# Patient Record
Sex: FEMALE | Race: BLACK OR AFRICAN AMERICAN | NOT HISPANIC OR LATINO | Employment: OTHER | URBAN - METROPOLITAN AREA
[De-identification: names, ages, dates, MRNs, and addresses within clinical notes are randomized per-mention and may not be internally consistent; named-entity substitution may affect disease eponyms.]

---

## 2023-10-29 ENCOUNTER — HOSPITAL ENCOUNTER (EMERGENCY)
Facility: HOSPITAL | Age: 68
Discharge: HOME | End: 2023-10-29
Payer: COMMERCIAL

## 2023-10-29 VITALS
HEART RATE: 93 BPM | SYSTOLIC BLOOD PRESSURE: 116 MMHG | RESPIRATION RATE: 16 BRPM | BODY MASS INDEX: 24.33 KG/M2 | WEIGHT: 155 LBS | TEMPERATURE: 97.5 F | HEIGHT: 67 IN | DIASTOLIC BLOOD PRESSURE: 67 MMHG | OXYGEN SATURATION: 98 %

## 2023-10-29 DIAGNOSIS — R30.0 DYSURIA: Primary | ICD-10-CM

## 2023-10-29 LAB
APPEARANCE UR: ABNORMAL
BILIRUB UR STRIP.AUTO-MCNC: NEGATIVE MG/DL
CAOX CRY #/AREA UR COMP ASSIST: ABNORMAL /HPF
COLOR UR: YELLOW
GLUCOSE UR STRIP.AUTO-MCNC: NEGATIVE MG/DL
HOLD SPECIMEN: NORMAL
KETONES UR STRIP.AUTO-MCNC: NEGATIVE MG/DL
LEUKOCYTE ESTERASE UR QL STRIP.AUTO: NEGATIVE
MUCOUS THREADS #/AREA URNS AUTO: ABNORMAL /LPF
NITRITE UR QL STRIP.AUTO: NEGATIVE
PH UR STRIP.AUTO: 5 [PH]
PROT UR STRIP.AUTO-MCNC: ABNORMAL MG/DL
RBC # UR STRIP.AUTO: NEGATIVE /UL
RBC #/AREA URNS AUTO: ABNORMAL /HPF
SP GR UR STRIP.AUTO: 1.03
SQUAMOUS #/AREA URNS AUTO: ABNORMAL /HPF
UROBILINOGEN UR STRIP.AUTO-MCNC: 4 MG/DL
WBC #/AREA URNS AUTO: ABNORMAL /HPF

## 2023-10-29 PROCEDURE — 99283 EMERGENCY DEPT VISIT LOW MDM: CPT | Performed by: PHYSICIAN ASSISTANT

## 2023-10-29 PROCEDURE — 99284 EMERGENCY DEPT VISIT MOD MDM: CPT | Performed by: PHYSICIAN ASSISTANT

## 2023-10-29 PROCEDURE — 2500000005 HC RX 250 GENERAL PHARMACY W/O HCPCS: Performed by: PHYSICIAN ASSISTANT

## 2023-10-29 PROCEDURE — 81001 URINALYSIS AUTO W/SCOPE: CPT | Performed by: PHYSICIAN ASSISTANT

## 2023-10-29 RX ORDER — LIDOCAINE 560 MG/1
1 PATCH PERCUTANEOUS; TOPICAL; TRANSDERMAL ONCE
Status: DISCONTINUED | OUTPATIENT
Start: 2023-10-29 | End: 2023-10-29 | Stop reason: HOSPADM

## 2023-10-29 RX ORDER — ACETAMINOPHEN 325 MG/1
975 TABLET ORAL ONCE
Status: COMPLETED | OUTPATIENT
Start: 2023-10-29 | End: 2023-10-29

## 2023-10-29 RX ORDER — PHENAZOPYRIDINE HYDROCHLORIDE 200 MG/1
200 TABLET, FILM COATED ORAL 3 TIMES DAILY
Qty: 6 TABLET | Refills: 0 | Status: SHIPPED | OUTPATIENT
Start: 2023-10-29 | End: 2023-10-31

## 2023-10-29 RX ADMIN — LIDOCAINE 1 PATCH: 4 PATCH TOPICAL at 12:01

## 2023-10-29 RX ADMIN — ACETAMINOPHEN 975 MG: 325 TABLET ORAL at 12:01

## 2023-10-29 ASSESSMENT — COLUMBIA-SUICIDE SEVERITY RATING SCALE - C-SSRS
1. IN THE PAST MONTH, HAVE YOU WISHED YOU WERE DEAD OR WISHED YOU COULD GO TO SLEEP AND NOT WAKE UP?: NO
6. HAVE YOU EVER DONE ANYTHING, STARTED TO DO ANYTHING, OR PREPARED TO DO ANYTHING TO END YOUR LIFE?: NO
2. HAVE YOU ACTUALLY HAD ANY THOUGHTS OF KILLING YOURSELF?: NO

## 2023-10-29 NOTE — ED NOTES
Pt in through triage for urinary frequency and burning that started about a week ago. Pt. Describes flank pain rated a 7/10. Reports urine is yellow and cloudy with no foul odor. A&Ox4. PERRLA. Denies oher complaints. Neurologically intact     Stella Kearney RN  10/29/23 2413

## 2023-10-29 NOTE — DISCHARGE INSTRUCTIONS
Your urinalysis shows no signs of UTI.  Take the Pyridium to help your symptoms for the next 2 days.  Increase your fluid intake.  For persistent symptoms follow-up with a gynecologist, call [phone] to schedule an appointment.

## 2023-10-29 NOTE — ED PROVIDER NOTES
"HPI:  68-year-old female with history of DM 2, HLD, HTN, arthritis presents planing of dysuria x2 days.  States she feels as burning with urination for day or 2 after sexual intercourse For the past \"a while\".  States she also has some frequency without incontinence or cloudy urine.  She has not been to her doctor yet for this.  Denies any fevers, chills, night sweats or rigors.  Denies any nausea or vomiting.      Physical Exam:   GEN: Vitals noted. NAD  EYES:  EOMs grossly intact, anicteric sclera  JOHN: Mucosa moist.  NECK: Supple.  CARD: RRR  PULMONARY: Moving air well. Clear all lung fields.  ABDOMEN: Soft, no guarding, no rigidity. Nontender. NABS  Back: No CVA tenderness present bilaterally  EXTREMITIES: Full ROM, no pitting edema,   SKIN: Intact, warm and dry  NEURO: Alert and oriented x 3, speech is clear, no obvious deficits noted.       ----------------------------------------------------------------------------------------------------------------------------    MDM:  60-year-old female presenting for dysuria.  On exam she is well-appearing able to complete.  Vital signs stable.  ABD soft NTTP with NABS.  She has no CVA tenderness present.  Urinalysis shows no signs UTI.  We will give her prescription for Pyridium for her dysuria without UTI.  She is given phone number for GYN follow-up.  Return precautions reviewed.    No orders to display     Labs Reviewed   URINALYSIS WITH REFLEX MICROSCOPIC AND CULTURE - Abnormal       Result Value    Color, Urine Yellow      Appearance, Urine Hazy (*)     Specific Gravity, Urine 1.029      pH, Urine 5.0      Protein, Urine 30 (1+) (*)     Glucose, Urine NEGATIVE      Blood, Urine NEGATIVE      Ketones, Urine NEGATIVE      Bilirubin, Urine NEGATIVE      Urobilinogen, Urine 4.0 (*)     Nitrite, Urine NEGATIVE      Leukocyte Esterase, Urine NEGATIVE     URINALYSIS MICROSCOPIC WITH REFLEX CULTURE - Abnormal    WBC, Urine 1-5      RBC, Urine 3-5      Squamous Epithelial " Cells, Urine 10-25 (FEW)      Mucus, Urine 4+      Calcium Oxalate Crystals, Urine 4+ (*)    URINALYSIS WITH REFLEX MICROSCOPIC AND CULTURE    Narrative:     The following orders were created for panel order Urinalysis with Reflex Microscopic and Culture.  Procedure                               Abnormality         Status                     ---------                               -----------         ------                     Urinalysis with Reflex M...[[id number]]  Abnormal            Final result               Extra Urine Gray Tube[[id number]]                            In process                   Please view results for these tests on the individual orders.   EXTRA URINE GRAY TUBE     Diagnoses as of 10/29/23 1259   Dysuria     ----------------------------------------------------------------------------------------------------------------------------    This note was dictated using a speech recognition program.  While an attempt was made at proof reading to minimize errors, minor errors in transcription may be present call for questions.     Adiel Harrell PA-C  10/29/23 0823

## 2024-01-10 ENCOUNTER — HOSPITAL ENCOUNTER (EMERGENCY)
Facility: HOSPITAL | Age: 69
Discharge: HOME | End: 2024-01-10
Payer: COMMERCIAL

## 2024-01-10 VITALS
HEART RATE: 89 BPM | RESPIRATION RATE: 18 BRPM | WEIGHT: 162 LBS | DIASTOLIC BLOOD PRESSURE: 68 MMHG | HEIGHT: 61 IN | OXYGEN SATURATION: 95 % | TEMPERATURE: 97.5 F | BODY MASS INDEX: 30.58 KG/M2 | SYSTOLIC BLOOD PRESSURE: 119 MMHG

## 2024-01-10 PROCEDURE — 99283 EMERGENCY DEPT VISIT LOW MDM: CPT

## 2024-01-10 PROCEDURE — 4500999001 HC ED NO CHARGE

## 2024-01-10 ASSESSMENT — COLUMBIA-SUICIDE SEVERITY RATING SCALE - C-SSRS
6. HAVE YOU EVER DONE ANYTHING, STARTED TO DO ANYTHING, OR PREPARED TO DO ANYTHING TO END YOUR LIFE?: NO
2. HAVE YOU ACTUALLY HAD ANY THOUGHTS OF KILLING YOURSELF?: NO
1. IN THE PAST MONTH, HAVE YOU WISHED YOU WERE DEAD OR WISHED YOU COULD GO TO SLEEP AND NOT WAKE UP?: NO

## 2024-01-10 NOTE — ED TRIAGE NOTES
Pt presented with c/o being stressed out stating that he daughter and spose yell to much and she also feels off, pt states that she was seen at a [facility] today and presented paperwork to triage RN when triage RN reviewed paperwork with pt and pt was informed that she tested + for a UTI and was prescribed an antibiotic pt states that she didn't know that and if that is the case she no longer wants to be seen that she has everything she needs and is going to go home, pt then left

## 2024-01-11 ENCOUNTER — HOSPITAL ENCOUNTER (EMERGENCY)
Facility: HOSPITAL | Age: 69
Discharge: OTHER INSTITUTION | End: 2024-01-11
Attending: EMERGENCY MEDICINE
Payer: COMMERCIAL

## 2024-01-11 VITALS
OXYGEN SATURATION: 96 % | TEMPERATURE: 97.9 F | HEART RATE: 96 BPM | RESPIRATION RATE: 18 BRPM | SYSTOLIC BLOOD PRESSURE: 149 MMHG | DIASTOLIC BLOOD PRESSURE: 84 MMHG

## 2024-01-11 DIAGNOSIS — Z76.0 MEDICATION REFILL: Primary | ICD-10-CM

## 2024-01-11 PROCEDURE — 99283 EMERGENCY DEPT VISIT LOW MDM: CPT

## 2024-01-11 PROCEDURE — 99281 EMR DPT VST MAYX REQ PHY/QHP: CPT | Performed by: EMERGENCY MEDICINE

## 2024-01-11 ASSESSMENT — PAIN DESCRIPTION - DESCRIPTORS: DESCRIPTORS: CRAMPING

## 2024-01-11 NOTE — ED TRIAGE NOTES
Pt presented with c/o leg spasms that she had at home but has since got better pt states that she was taking a muscle relaxant for the spasms but has ran out of the prescription and is requesting a refill

## 2024-01-11 NOTE — ED PROVIDER NOTES
"CC: Leg Pain and Med Refill     HPI: Patient is a 68-year-old female with history of DM2, HLD, HTN, arthritis presented to the emergency department today for medication refill.  Reports was recently diagnosed with urinary tract infection, and woke up in middle the night with some spasms of the bilateral lower extremities.  Has had Norflex in the past that has been effective for her spasms and came to the emergency department today \"to get a prescription\".  Saw her PCP yesterday who had diagnosed her with UTI after she said it was burning when she pees.  Denies fevers, chills, chest pain, shortness of breath, abdominal pain, or change in urination/stool.  Patient acting a little delirious in the emergency department but is alert and oriented x 4.      Records Reviewed:  Recent available ED and inpatient notes reviewed in EMR.    PMHx/PSHx:  Per HPI.   - has no past medical history on file.  - has no past surgical history on file.  - does not have a problem list on file.    Medications:  Reviewed in EMR. See EMR for complete list of medications and doses.    Allergies:  Fish derived and Penicillins    Social History:  - Tobacco:  has no history on file for tobacco use.   - Alcohol:  has no history on file for alcohol use.   - Illicit Drugs:  has no history on file for drug use.     ROS:  Per HPI.       ???????????????????????????????????????????????????????????????  Triage Vitals:  T 36.6 °C (97.9 °F)  HR 96  /84  RR 18  O2 96 % None (Room air)    Physical Exam  Vitals and nursing note reviewed.   Constitutional:       General: She is not in acute distress.     Appearance: She is well-developed.   HENT:      Head: Normocephalic and atraumatic.   Eyes:      Conjunctiva/sclera: Conjunctivae normal.   Cardiovascular:      Rate and Rhythm: Normal rate and regular rhythm.      Heart sounds: No murmur heard.  Pulmonary:      Effort: Pulmonary effort is normal. No respiratory distress.      Breath sounds: Normal " breath sounds.   Abdominal:      Palpations: Abdomen is soft.      Tenderness: There is no abdominal tenderness.   Musculoskeletal:         General: No swelling.      Cervical back: Neck supple.   Skin:     General: Skin is warm and dry.      Capillary Refill: Capillary refill takes less than 2 seconds.   Neurological:      Mental Status: She is alert.   Psychiatric:         Mood and Affect: Mood and affect normal.         Speech: Speech is tangential.         Behavior: Behavior normal.         Thought Content: Thought content normal.         Cognition and Memory: Cognition normal.      Comments: Patient is alert and oriented x 4.  Appears a little bit tangential in her speech and mildly delirious.       ???????????????????????????????????????????????????????????????    Medical Decision Making   Patient is a 68-year-old female presenting to emergency department today for medication refill.  On arrival, vital signs within normal limits, afebrile for us.  On examination, patient is alert and orient x 4 although does appear a little bit delirious.  I think the likely cause is her urinary tract infection. She says she has been taking antibiotics as prescribed.  Does live alone by herself but has a partner who visits her frequently and helps take care of her.  Reports she feels safe at home.  Able to ambulate in the Emergency Department without difficulty.  Reports her spasms have since resolved since arriving to the emergency department.  Able to answer questions appropriately for me.  Before attending can see patient, she did leave without treatment complete first today as her boyfriend came to pick her up to the emergency department.  I believes she has safe discharge planning at this time and before she left advised her to return back to the emergency department if she gets fevers, chills, flank pain or other red flag symptoms.      Diagnoses as of 01/11/24 0755   Medication refill       Social Determinants Limiting  Care:  None identified    Disposition:   LWTC    Maldonado Dye MD  Emergency Medicine PGY2      Procedures ? SmartLinks last updated 1/11/2024 7:55 AM          Maldonado Dye MD  Resident  01/11/24 0755

## 2024-01-22 ENCOUNTER — HOSPITAL ENCOUNTER (EMERGENCY)
Facility: HOSPITAL | Age: 69
Discharge: HOME | End: 2024-01-23
Attending: EMERGENCY MEDICINE
Payer: COMMERCIAL

## 2024-01-22 VITALS
HEIGHT: 67 IN | BODY MASS INDEX: 25.9 KG/M2 | OXYGEN SATURATION: 95 % | HEART RATE: 84 BPM | RESPIRATION RATE: 18 BRPM | SYSTOLIC BLOOD PRESSURE: 103 MMHG | WEIGHT: 165 LBS | DIASTOLIC BLOOD PRESSURE: 60 MMHG | TEMPERATURE: 96.8 F

## 2024-01-22 DIAGNOSIS — S40.862A INSECT BITE OF LEFT UPPER ARM, INITIAL ENCOUNTER: Primary | ICD-10-CM
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
## 2024-01-22 PROCEDURE — 99283 EMERGENCY DEPT VISIT LOW MDM: CPT | Performed by: EMERGENCY MEDICINE

## 2024-01-22 PROCEDURE — 99284 EMERGENCY DEPT VISIT MOD MDM: CPT | Performed by: EMERGENCY MEDICINE

## 2024-01-22 ASSESSMENT — COLUMBIA-SUICIDE SEVERITY RATING SCALE - C-SSRS
2. HAVE YOU ACTUALLY HAD ANY THOUGHTS OF KILLING YOURSELF?: NO
6. HAVE YOU EVER DONE ANYTHING, STARTED TO DO ANYTHING, OR PREPARED TO DO ANYTHING TO END YOUR LIFE?: NO
1. IN THE PAST MONTH, HAVE YOU WISHED YOU WERE DEAD OR WISHED YOU COULD GO TO SLEEP AND NOT WAKE UP?: NO

## 2024-01-23 PROCEDURE — 2500000001 HC RX 250 WO HCPCS SELF ADMINISTERED DRUGS (ALT 637 FOR MEDICARE OP): Performed by: EMERGENCY MEDICINE

## 2024-01-23 RX ORDER — HYDROXYZINE HYDROCHLORIDE 25 MG/1
25 TABLET, FILM COATED ORAL EVERY 6 HOURS
Qty: 10 TABLET | Refills: 0 | Status: SHIPPED | OUTPATIENT
Start: 2024-01-23 | End: 2024-04-18

## 2024-01-23 RX ORDER — HYDROXYZINE HYDROCHLORIDE 25 MG/1
25 TABLET, FILM COATED ORAL ONCE
Status: COMPLETED | OUTPATIENT
Start: 2024-01-23 | End: 2024-01-23

## 2024-01-23 RX ADMIN — HYDROXYZINE HYDROCHLORIDE 25 MG: 25 TABLET, FILM COATED ORAL at 00:16

## 2024-01-23 ASSESSMENT — LIFESTYLE VARIABLES
HAVE YOU EVER FELT YOU SHOULD CUT DOWN ON YOUR DRINKING: NO
REASON UNABLE TO ASSESS: NO
HAVE PEOPLE ANNOYED YOU BY CRITICIZING YOUR DRINKING: NO
EVER FELT BAD OR GUILTY ABOUT YOUR DRINKING: NO
EVER HAD A DRINK FIRST THING IN THE MORNING TO STEADY YOUR NERVES TO GET RID OF A HANGOVER: NO

## 2024-01-23 ASSESSMENT — PAIN - FUNCTIONAL ASSESSMENT: PAIN_FUNCTIONAL_ASSESSMENT: 0-10

## 2024-01-23 ASSESSMENT — PAIN SCALES - GENERAL: PAINLEVEL_OUTOF10: 0 - NO PAIN

## 2024-01-23 NOTE — ED TRIAGE NOTES
RODOLFO GORDON is a 68y old F with a PMHx significant for DM2, HLD, HTN, arthritis presented to the emergency department today for with complaint of L arm itching. Pt states she was walking around outside going to an AA meeting, got there and the lights were out. She walked back home when her arm started itching. Denies any falls or injuries to arm. No associated SOB or chest pain. Pt is alert and oriented x3 with a GCS of 15. No recent sick contacts or COVID symptoms. Allergies documented in EMR

## 2024-01-23 NOTE — DISCHARGE INSTRUCTIONS
You were seen in the emergency department for itchiness to your left arm.  It appears that you have a few small bumps, that appear concerning for an insect bite.  Monitor the area closely.  Wash your clothing and bed sheets.  If this continues to spread, other bites develop, or you start to have other concerning symptoms, return to the emergency department.

## 2024-01-23 NOTE — ED PROVIDER NOTES
CC: Arm itching      HPI: Leni Berman is a 68-year-old female with history including hypertension, hyperlipidemia, diabetes presenting to the emergency department for itchiness in her left arm.  States she was walking around her neighborhood to go her AA meeting but the lights were out so she went home. She briefly sat on the couch and and then started to notice her arm itching. Denies any known insect bites or inciting event. States she was wearing her jacket and has not had any known exposures. No other areas of itchiness or other skin changes. Denies paresthesia, weakness, shortness of breath, sensation of throat or facial swelling, recent illness.    Limitations to History: none  Additional History Obtained from: none    PMHx/PSHx:  Per HPI.   - has no past medical history on file.  - has no past surgical history on file.    Social History:  - Tobacco:  has no history on file for tobacco use.   - Alcohol:  has no history on file for alcohol use.   - Drugs:  has no history on file for drug use.     Medications: Reviewed in EMR.     Allergies:  Fish containing products, Penicillins, Fish derived, and Shellfish containing products    ???????????????????????????????????????????????????????????????  Triage Vitals:  T 36 °C (96.8 °F)  HR 84  /60  RR 18  O2 95 % None (Room air)    Physical Exam   Gen: awake, well-appearing, no distress  Eyes: no conjunctival injection or scleral icterus, pupils equal, extraocular movements grossly intact  ENT: nares patent, moist mucous membranes, no oropharyngeal edema   Neck: supple, trachea midline, no masses  Heart: regular rate and rhythm, audible heart sounds  Lungs: clear to auscultation bilaterally, no increased work of breathing  Abdomen: soft, nondistended, nontender, no guarding or rebound  Extremities: well-perfused, no edema, three faintly erythematous papular lesions to left proximal arm with excoriations, no vesicular component, no sloughing, no fluctuance.  No palm/sole involvement  Neuro: alert, conversant, no facial asymmetry, speech fluent, moving all extremities    ???????????????????????????????????????????????????????????????  ED Course/Medical Decision Makin-year-old female with history including hypertension, hyperlipidemia, diabetes presenting to the emergency department for itchiness in her left arm that started spontaneously this evening. She's well-appearing, no distress. Exam notable for 3 faintly erythematous papular lesions to left arm that appear suggestive of insect bite. Does not appear consistent with staph or herpetic infection. No signs of SSTI. She was given atarax here and prescription for a few doses doses at home. She is comfortable with discharge and close monitoring. Given return precautions.   Diagnoses as of 24 0017   Insect bite of left upper arm, initial encounter     Discussion of management with other providers: n/a  Prescription Drug Consideration: atarax  Escalation of Care: outpatient mgmt appropriate    Disposition: discharge      Procedures ? SmartLinks last updated 2024 12:17 AM        Sophia Means MD  [phone]

## 2024-03-07 ENCOUNTER — LAB (OUTPATIENT)
Dept: LAB | Facility: LAB | Age: 69
End: 2024-03-07
Payer: COMMERCIAL

## 2024-03-07 ENCOUNTER — OFFICE VISIT (OUTPATIENT)
Dept: PRIMARY CARE | Facility: CLINIC | Age: 69
End: 2024-03-07
Payer: COMMERCIAL

## 2024-03-07 VITALS
WEIGHT: 158.3 LBS | DIASTOLIC BLOOD PRESSURE: 68 MMHG | SYSTOLIC BLOOD PRESSURE: 104 MMHG | BODY MASS INDEX: 24.84 KG/M2 | OXYGEN SATURATION: 98 % | HEIGHT: 67 IN | TEMPERATURE: 97.4 F | HEART RATE: 109 BPM

## 2024-03-07 DIAGNOSIS — R35.0 URINARY FREQUENCY: ICD-10-CM

## 2024-03-07 DIAGNOSIS — Z11.59 NEED FOR HEPATITIS C SCREENING TEST: ICD-10-CM

## 2024-03-07 DIAGNOSIS — N39.3 STRESS INCONTINENCE OF URINE: ICD-10-CM

## 2024-03-07 DIAGNOSIS — R00.0 TACHYCARDIA: ICD-10-CM

## 2024-03-07 DIAGNOSIS — E11.9 TYPE 2 DIABETES MELLITUS WITHOUT COMPLICATION, WITHOUT LONG-TERM CURRENT USE OF INSULIN (MULTI): Primary | ICD-10-CM

## 2024-03-07 DIAGNOSIS — Z11.4 SCREENING FOR HIV (HUMAN IMMUNODEFICIENCY VIRUS): ICD-10-CM

## 2024-03-07 DIAGNOSIS — E11.9 TYPE 2 DIABETES MELLITUS WITHOUT COMPLICATION, WITHOUT LONG-TERM CURRENT USE OF INSULIN (MULTI): ICD-10-CM

## 2024-03-07 LAB
ALBUMIN SERPL BCP-MCNC: 4.2 G/DL (ref 3.4–5)
ALP SERPL-CCNC: 72 U/L (ref 33–136)
ALT SERPL W P-5'-P-CCNC: 32 U/L (ref 7–45)
ANION GAP SERPL CALC-SCNC: 14 MMOL/L (ref 10–20)
APPEARANCE UR: ABNORMAL
AST SERPL W P-5'-P-CCNC: 27 U/L (ref 9–39)
BACTERIA #/AREA URNS AUTO: ABNORMAL /HPF
BASOPHILS # BLD AUTO: 0.05 X10*3/UL (ref 0–0.1)
BASOPHILS NFR BLD AUTO: 0.8 %
BILIRUB SERPL-MCNC: 0.6 MG/DL (ref 0–1.2)
BILIRUB UR STRIP.AUTO-MCNC: NEGATIVE MG/DL
BUN SERPL-MCNC: 11 MG/DL (ref 6–23)
CALCIUM SERPL-MCNC: 9.8 MG/DL (ref 8.6–10.6)
CAOX CRY #/AREA UR COMP ASSIST: ABNORMAL /HPF
CHLORIDE SERPL-SCNC: 106 MMOL/L (ref 98–107)
CO2 SERPL-SCNC: 28 MMOL/L (ref 21–32)
COLOR UR: YELLOW
CREAT SERPL-MCNC: 0.77 MG/DL (ref 0.5–1.05)
EGFRCR SERPLBLD CKD-EPI 2021: 84 ML/MIN/1.73M*2
EOSINOPHIL # BLD AUTO: 0.03 X10*3/UL (ref 0–0.7)
EOSINOPHIL NFR BLD AUTO: 0.5 %
ERYTHROCYTE [DISTWIDTH] IN BLOOD BY AUTOMATED COUNT: 16.9 % (ref 11.5–14.5)
GLUCOSE SERPL-MCNC: 84 MG/DL (ref 74–99)
GLUCOSE UR STRIP.AUTO-MCNC: NORMAL MG/DL
HCT VFR BLD AUTO: 40 % (ref 36–46)
HCV AB SER QL: NONREACTIVE
HGB BLD-MCNC: 13.1 G/DL (ref 12–16)
HIV 1+2 AB+HIV1 P24 AG SERPL QL IA: NONREACTIVE
IMM GRANULOCYTES # BLD AUTO: 0.02 X10*3/UL (ref 0–0.7)
IMM GRANULOCYTES NFR BLD AUTO: 0.3 % (ref 0–0.9)
KETONES UR STRIP.AUTO-MCNC: ABNORMAL MG/DL
LEUKOCYTE ESTERASE UR QL STRIP.AUTO: NEGATIVE
LYMPHOCYTES # BLD AUTO: 2.71 X10*3/UL (ref 1.2–4.8)
LYMPHOCYTES NFR BLD AUTO: 44.3 %
MCH RBC QN AUTO: 26.4 PG (ref 26–34)
MCHC RBC AUTO-ENTMCNC: 32.8 G/DL (ref 32–36)
MCV RBC AUTO: 81 FL (ref 80–100)
MONOCYTES # BLD AUTO: 0.45 X10*3/UL (ref 0.1–1)
MONOCYTES NFR BLD AUTO: 7.4 %
MUCOUS THREADS #/AREA URNS AUTO: ABNORMAL /LPF
NEUTROPHILS # BLD AUTO: 2.86 X10*3/UL (ref 1.2–7.7)
NEUTROPHILS NFR BLD AUTO: 46.7 %
NITRITE UR QL STRIP.AUTO: NEGATIVE
NRBC BLD-RTO: 0 /100 WBCS (ref 0–0)
PH UR STRIP.AUTO: 5.5 [PH]
PLATELET # BLD AUTO: 166 X10*3/UL (ref 150–450)
POTASSIUM SERPL-SCNC: 4.1 MMOL/L (ref 3.5–5.3)
PROT SERPL-MCNC: 6.8 G/DL (ref 6.4–8.2)
PROT UR STRIP.AUTO-MCNC: ABNORMAL MG/DL
RBC # BLD AUTO: 4.97 X10*6/UL (ref 4–5.2)
RBC # UR STRIP.AUTO: NEGATIVE /UL
RBC #/AREA URNS AUTO: ABNORMAL /HPF
SODIUM SERPL-SCNC: 144 MMOL/L (ref 136–145)
SP GR UR STRIP.AUTO: 1.03
SQUAMOUS #/AREA URNS AUTO: ABNORMAL /HPF
TSH SERPL-ACNC: 1.34 MIU/L (ref 0.44–3.98)
UROBILINOGEN UR STRIP.AUTO-MCNC: ABNORMAL MG/DL
WBC # BLD AUTO: 6.1 X10*3/UL (ref 4.4–11.3)
WBC #/AREA URNS AUTO: ABNORMAL /HPF

## 2024-03-07 PROCEDURE — 99214 OFFICE O/P EST MOD 30 MIN: CPT

## 2024-03-07 PROCEDURE — 4010F ACE/ARB THERAPY RXD/TAKEN: CPT

## 2024-03-07 PROCEDURE — 84443 ASSAY THYROID STIM HORMONE: CPT

## 2024-03-07 PROCEDURE — 3044F HG A1C LEVEL LT 7.0%: CPT

## 2024-03-07 PROCEDURE — 87389 HIV-1 AG W/HIV-1&-2 AB AG IA: CPT

## 2024-03-07 PROCEDURE — 80053 COMPREHEN METABOLIC PANEL: CPT

## 2024-03-07 PROCEDURE — 86803 HEPATITIS C AB TEST: CPT

## 2024-03-07 PROCEDURE — 3074F SYST BP LT 130 MM HG: CPT

## 2024-03-07 PROCEDURE — 36415 COLL VENOUS BLD VENIPUNCTURE: CPT

## 2024-03-07 PROCEDURE — 3078F DIAST BP <80 MM HG: CPT

## 2024-03-07 PROCEDURE — 83036 HEMOGLOBIN GLYCOSYLATED A1C: CPT

## 2024-03-07 PROCEDURE — 81001 URINALYSIS AUTO W/SCOPE: CPT

## 2024-03-07 PROCEDURE — 85025 COMPLETE CBC W/AUTO DIFF WBC: CPT

## 2024-03-07 PROCEDURE — 1125F AMNT PAIN NOTED PAIN PRSNT: CPT

## 2024-03-07 PROCEDURE — 1159F MED LIST DOCD IN RCRD: CPT

## 2024-03-07 RX ORDER — ATORVASTATIN CALCIUM 40 MG/1
40 TABLET, FILM COATED ORAL DAILY
COMMUNITY
End: 2024-05-01 | Stop reason: SDUPTHER

## 2024-03-07 RX ORDER — CYCLOBENZAPRINE HCL 10 MG
10 TABLET ORAL EVERY 8 HOURS PRN
COMMUNITY
Start: 2022-11-10

## 2024-03-07 RX ORDER — LANOLIN ALCOHOL/MO/W.PET/CERES
400 CREAM (GRAM) TOPICAL DAILY
COMMUNITY
Start: 2023-09-15

## 2024-03-07 RX ORDER — ENALAPRIL MALEATE 10 MG/1
10 TABLET ORAL
COMMUNITY
Start: 2023-02-22 | End: 2024-05-01 | Stop reason: SDUPTHER

## 2024-03-07 RX ORDER — METFORMIN HYDROCHLORIDE 500 MG/1
500 TABLET ORAL
COMMUNITY
Start: 2020-04-27

## 2024-03-07 ASSESSMENT — ENCOUNTER SYMPTOMS: DEPRESSION: 0

## 2024-03-07 ASSESSMENT — PAIN SCALES - GENERAL: PAINLEVEL: 6

## 2024-03-07 NOTE — PROGRESS NOTES
"Precepting Note    Patient was seen in [facility] residency clinic today as part of a multidisciplinary teaching team. I participated in this patient's care as a jada precepting physician.    Establishing Care. DM, AUD c/b substance induced depression, GERD, HTN. 40 pack years, 1ppd. Recurrent \"UTIs\" x2y every 1-3 months with pressured speech and back pain. Enema dependent (once weekly) with regular stool softeners. UA today and additional UA ordered for patient to use when symptoms recur. Has appointment scheduled with urology in 5/2024, recommend urogynecology for dual problem of urinary urgency and stress incontinence. Back pain more likely related to arthritis, although no imaging on file. Does report intermittent manic symptoms, will need to explore this further at future visits. Short interval follow up for MWV. Discussed plan with co-resident and attending provider, Dr. Ferraro.    Alka Hays MD  Family Medicine PGY-3   "

## 2024-03-07 NOTE — PROGRESS NOTES
Subjective   Patient ID: Leni Berman is a 68 y.o. female with PMH of T2DM (A1c 6.8 23), HTN, HLD, recurrent UTIs?, AUD (still drinks but less), Depression (situational but resolved), GERD (resolved) who presents for Establish Care and recurrent back pain & urinary frequency    HPI    Establish care  - History: T2DM (A1c 6.8 23), HTN, HLD, recurrent UTIs?, AUD (still drinks but less), Depression (situational but resolved), GERD (resolved)  - Meds: Reviewed and updated  - Allergies: Penicillin (Hives), Shellfish (sob)  - Smokin pack a day ~ 30-40 years    Concerns:   1) Recurrent UTI  - For 2 years  - Used to be monthly   - Now every 3 months  - Described as Lower back pain, increase frequency  - Associated with manic behavior: can't sit still, pressure talks, doesn't sleep but is tired  - Reports Bladder incontinence, when cough  - No dysuria, no intermittent stream, no hematuria  - No fever, nausea, vomiting, abdominal pain  - Takes tylenol which relieves back pain  - Associated with constipation     2) Constipated  - BM every 2-3 days  - Uses daily miralax once -> doesn't work  - Enema once a week    3)Darling  - Only happens when has back pain and urinary frequency  - Pressured speech  - Unable to sleep but feels tired and wants to sleep  - No hx of bipolar  - Hx of depression 2/2 AUD now resolved    4) Smoking  - Smokin pack a day ~ 30-40 years  - Advised smoking, open to counseling on next visit      Review of Systems   All other systems reviewed and are negative.      Current Outpatient Medications on File Prior to Visit   Medication Sig Dispense Refill    cyclobenzaprine (Flexeril) 10 mg tablet Take 1 tablet (10 mg) by mouth every 8 hours if needed for muscle spasms.      enalapril (Vasotec) 10 mg tablet Take 1 tablet (10 mg) by mouth once daily.      magnesium oxide (Mag-Ox) 400 mg (241.3 mg magnesium) tablet Take 1 tablet (400 mg) by mouth once daily.      metFORMIN (Glucophage) 500 mg  "tablet Take 1 tablet (500 mg) by mouth 2 times a day with meals.      atorvastatin (Lipitor) 40 mg tablet Take 1 tablet (40 mg) by mouth once daily.      hydrOXYzine HCL (Atarax) 25 mg tablet Take 1 tablet (25 mg) by mouth every 6 hours for 10 doses. 10 tablet 0     No current facility-administered medications on file prior to visit.        Objective   Vitals: /68 (BP Location: Right arm, Patient Position: Sitting, BP Cuff Size: Adult)   Pulse 109   Temp 36.3 °C (97.4 °F) (Temporal)   Ht 1.702 m (5' 7\")   Wt 71.8 kg (158 lb 4.8 oz)   SpO2 98%   BMI 24.79 kg/m²      Physical Exam  General: well-appearing, NAD  HEENT: NC/AT  Cardiac: rrr, no m/r/g  Lungs: normal work of breathing, CTAB  Abdomen: soft, non-tender, non-distended, BS present  Neuro: grossly intact  MSK: No peripheral edema, cyanosis, or pallor  Psych: no depression, anxiety    Assessment/Plan   Problem List Items Addressed This Visit    None  Visit Diagnoses       Type 2 diabetes mellitus without complication, without long-term current use of insulin (CMS/[facility])    -  Primary    Relevant Orders    Hemoglobin A1c    Comprehensive metabolic panel    Tachycardia        Relevant Orders    CBC and Auto Differential (Completed)    Tsh With Reflex To Free T4 If Abnormal    Stress incontinence of urine        Relevant Orders    Referral to Urogynecology    Screening for HIV (human immunodeficiency virus)        Relevant Orders    HIV 1/2 Antigen/Antibody Screen with Reflex to Confirmation    Need for hepatitis C screening test        Relevant Orders    Hepatitis C antibody    Urinary frequency        Relevant Orders    Urinalysis with Reflex Microscopic    Urinalysis with Reflex Microscopic          Leni Berman is a 68 y.o. female with PMH of T2DM (A1c 6.8 2/22/23), HTN, HLD, AUD who presents for Establish Care and recurrent episodes of back pain & urinary frequency with urinary incontinence. Also found to have tachycardia (). "     Tachycardia  :: Normal ECG in 2022  -     CBC r/o anemia  - TSH r/o thyroid disorder  Urinary frequency  Stress incontinence of urine  :: Patient and daughter insisting on her having recurrent UTI, although hx not indicative of recurrent UTI given no dysuria or abdominal pain or systemic symptoms. Symptoms possibly related to polyuria from DM-2 vs incontinence likely stress incontinence.   -     Referral to Urogynecology; Future  -     Urinalysis r/o proteinuria, hematuria, glucosuria  -     Urinalysis -> advised to repeat UA if dysuria/ suprapubic pain  -     Repeat Hemoglobin A1c;  Last A1c 6.8 2/22/23  -     Comprehensive metabolic panel; Future  Screening for HIV (human immunodeficiency virus)  Need for hepatitis C screening test  -     HIV 1/2 Antigen/Antibody Screen with Reflex to Confirmation; Future  -     Hepatitis C antibody; Future  Other orders  -     Follow Up In Primary Care -> HMV, Smoking cessation counseling, back pain follow-up, manic episodes characterization    Attending Supervision: seen and discussed with attending physician (cosigner listed on this note).    RTC in 1 month, or earlier as needed.    Anabell Amanda M.D.  Family Medicine  PGY-1

## 2024-03-09 LAB
EST. AVERAGE GLUCOSE BLD GHB EST-MCNC: 137 MG/DL
HBA1C MFR BLD: 6.4 %

## 2024-03-11 NOTE — PROGRESS NOTES
I saw and evaluated the patient. I personally obtained the key and critical portions of the history and physical exam or was physically present for key and critical portions performed by the resident/fellow. I reviewed the resident/fellow's documentation and discussed the patient with the resident/fellow. I agree with the resident/fellow's medical decision making as documented in the note.   I believe that consultation with a urogynecologist (frequency, stress incontinence, and history of recurrent UTI's) is likely to be helpful.     Bhavna Ferraro MD

## 2024-03-29 ENCOUNTER — TELEPHONE (OUTPATIENT)
Dept: PRIMARY CARE | Facility: CLINIC | Age: 69
End: 2024-03-29

## 2024-04-18 ENCOUNTER — APPOINTMENT (OUTPATIENT)
Dept: UROLOGY | Facility: CLINIC | Age: 69
End: 2024-04-18
Payer: COMMERCIAL

## 2024-04-18 ENCOUNTER — OFFICE VISIT (OUTPATIENT)
Dept: UROLOGY | Facility: CLINIC | Age: 69
End: 2024-04-18
Payer: COMMERCIAL

## 2024-04-18 VITALS
SYSTOLIC BLOOD PRESSURE: 132 MMHG | WEIGHT: 158 LBS | HEART RATE: 81 BPM | OXYGEN SATURATION: 96 % | DIASTOLIC BLOOD PRESSURE: 77 MMHG | TEMPERATURE: 98.2 F | BODY MASS INDEX: 24.8 KG/M2 | HEIGHT: 67 IN | RESPIRATION RATE: 16 BRPM

## 2024-04-18 DIAGNOSIS — N39.0 RECURRENT UTI: Primary | ICD-10-CM

## 2024-04-18 DIAGNOSIS — N39.3 STRESS INCONTINENCE OF URINE: ICD-10-CM

## 2024-04-18 LAB
POC APPEARANCE, URINE: CLEAR
POC BILIRUBIN, URINE: NEGATIVE
POC BLOOD, URINE: NEGATIVE
POC COLOR, URINE: YELLOW
POC GLUCOSE, URINE: NEGATIVE MG/DL
POC KETONES, URINE: NEGATIVE MG/DL
POC LEUKOCYTES, URINE: NEGATIVE
POC NITRITE,URINE: NEGATIVE
POC PH, URINE: 6.5 PH
POC PROTEIN, URINE: NEGATIVE MG/DL
POC SPECIFIC GRAVITY, URINE: >=1.03
POC UROBILINOGEN, URINE: 0.2 EU/DL

## 2024-04-18 PROCEDURE — 81003 URINALYSIS AUTO W/O SCOPE: CPT | Mod: QW,ZK | Performed by: PHYSICIAN ASSISTANT

## 2024-04-18 PROCEDURE — 3075F SYST BP GE 130 - 139MM HG: CPT | Performed by: PHYSICIAN ASSISTANT

## 2024-04-18 PROCEDURE — 1126F AMNT PAIN NOTED NONE PRSNT: CPT | Performed by: PHYSICIAN ASSISTANT

## 2024-04-18 PROCEDURE — 99213 OFFICE O/P EST LOW 20 MIN: CPT | Performed by: PHYSICIAN ASSISTANT

## 2024-04-18 PROCEDURE — 1159F MED LIST DOCD IN RCRD: CPT | Performed by: PHYSICIAN ASSISTANT

## 2024-04-18 PROCEDURE — 3078F DIAST BP <80 MM HG: CPT | Performed by: PHYSICIAN ASSISTANT

## 2024-04-18 PROCEDURE — 99213 OFFICE O/P EST LOW 20 MIN: CPT | Mod: ZK | Performed by: PHYSICIAN ASSISTANT

## 2024-04-18 RX ORDER — NITROFURANTOIN MACROCRYSTALS 50 MG/1
50 CAPSULE ORAL NIGHTLY
Qty: 30 CAPSULE | Refills: 11 | Status: SHIPPED | OUTPATIENT
Start: 2024-04-18 | End: 2024-05-18

## 2024-04-18 ASSESSMENT — ENCOUNTER SYMPTOMS
GASTROINTESTINAL NEGATIVE: 1
ENDOCRINE NEGATIVE: 1
LOSS OF SENSATION IN FEET: 0
CONSTITUTIONAL NEGATIVE: 1
OCCASIONAL FEELINGS OF UNSTEADINESS: 0
CARDIOVASCULAR NEGATIVE: 1
RESPIRATORY NEGATIVE: 1
HEMATOLOGIC/LYMPHATIC NEGATIVE: 1
EYES NEGATIVE: 1
DEPRESSION: 0
NEUROLOGICAL NEGATIVE: 1
MUSCULOSKELETAL NEGATIVE: 1
ALLERGIC/IMMUNOLOGIC NEGATIVE: 1
PSYCHIATRIC NEGATIVE: 1

## 2024-04-18 ASSESSMENT — PATIENT HEALTH QUESTIONNAIRE - PHQ9
1. LITTLE INTEREST OR PLEASURE IN DOING THINGS: NOT AT ALL
2. FEELING DOWN, DEPRESSED OR HOPELESS: NOT AT ALL
SUM OF ALL RESPONSES TO PHQ9 QUESTIONS 1 AND 2: 0

## 2024-04-18 ASSESSMENT — PAIN SCALES - GENERAL: PAINLEVEL: 0-NO PAIN

## 2024-04-18 NOTE — PROGRESS NOTES
Subjective   Patient ID: Leni Berman is a 69 y.o. female who presents for urine problems.  HPI  Patient is a 68 yo female with DM type 2, HTN, questionable recurrent UTIs presents for evaluation.    Patient reports about two weeks ago she went to urgent care with low back pain which is usually indicative for a UTI. She reports getting a UTI every other month. She is sexually active and believe her UTIs are related to sexual activity.     In Urgent care UA positive for moderate blood and leukocytes. Negative for nitrates. She was treated with antibiotics and reports all symptoms improved. No culture provided for review.    Reviewing her chart was negative for any documented UTIs.      Review of Systems   Constitutional: Negative.    HENT: Negative.     Eyes: Negative.    Respiratory: Negative.     Cardiovascular: Negative.    Gastrointestinal: Negative.    Endocrine: Negative.    Genitourinary: Negative.    Musculoskeletal: Negative.    Skin: Negative.    Allergic/Immunologic: Negative.    Neurological: Negative.    Hematological: Negative.    Psychiatric/Behavioral: Negative.         Objective   Physical Exam  Constitutional:       General: She is not in acute distress.     Appearance: Normal appearance.   HENT:      Head: Normocephalic and atraumatic.      Nose: Nose normal.      Mouth/Throat:      Mouth: Mucous membranes are dry.   Cardiovascular:      Rate and Rhythm: Normal rate.   Pulmonary:      Effort: Pulmonary effort is normal.   Abdominal:      General: Abdomen is flat.      Palpations: Abdomen is soft.   Musculoskeletal:         General: Normal range of motion.      Cervical back: Normal range of motion and neck supple.   Skin:     General: Skin is warm and dry.   Neurological:      General: No focal deficit present.      Mental Status: She is alert and oriented to person, place, and time.   Psychiatric:         Mood and Affect: Mood normal.       Assessment/Plan     I discussed since she feels her  urinary symptoms are related to sexual activity, I advised taking macrobid as needed post coital.       Addendum  Patient drop off results of urine culture from urgent care which came back negative for any growth.   I called patient and left a message for earlier follow up to discuss managing her urinary symptoms.     Patient home phone number is        Tim Crystal PA-C 04/18/24 9:43 AM

## 2024-04-26 ENCOUNTER — TELEPHONE (OUTPATIENT)
Dept: PRIMARY CARE | Facility: CLINIC | Age: 69
End: 2024-04-26

## 2024-04-27 ENCOUNTER — HOSPITAL ENCOUNTER (EMERGENCY)
Facility: HOSPITAL | Age: 69
Discharge: HOME | End: 2024-04-27
Payer: COMMERCIAL

## 2024-04-27 ENCOUNTER — APPOINTMENT (OUTPATIENT)
Dept: RADIOLOGY | Facility: HOSPITAL | Age: 69
End: 2024-04-27
Payer: COMMERCIAL

## 2024-04-27 VITALS
OXYGEN SATURATION: 97 % | BODY MASS INDEX: 25.9 KG/M2 | SYSTOLIC BLOOD PRESSURE: 107 MMHG | TEMPERATURE: 96.8 F | RESPIRATION RATE: 18 BRPM | HEIGHT: 67 IN | HEART RATE: 87 BPM | WEIGHT: 165 LBS | DIASTOLIC BLOOD PRESSURE: 69 MMHG

## 2024-04-27 DIAGNOSIS — M17.12 LOCALIZED OSTEOARTHRITIS OF LEFT KNEE: ICD-10-CM

## 2024-04-27 DIAGNOSIS — G89.29 CHRONIC PAIN OF LEFT KNEE: Primary | ICD-10-CM

## 2024-04-27 DIAGNOSIS — M25.562 CHRONIC PAIN OF LEFT KNEE: Primary | ICD-10-CM

## 2024-04-27 PROBLEM — N39.0 FREQUENT UTI: Status: ACTIVE | Noted: 2024-04-27

## 2024-04-27 PROBLEM — R20.2 PARESTHESIA: Status: ACTIVE | Noted: 2022-02-27

## 2024-04-27 PROBLEM — F10.20 ALCOHOL USE DISORDER, SEVERE, DEPENDENCE (MULTI): Chronic | Status: ACTIVE | Noted: 2023-02-22

## 2024-04-27 PROBLEM — R56.9 SEIZURE (MULTI): Chronic | Status: ACTIVE | Noted: 2023-02-21

## 2024-04-27 PROBLEM — M16.12 OSTEOARTHRITIS OF LEFT HIP: Status: ACTIVE | Noted: 2023-02-24

## 2024-04-27 PROBLEM — D69.6 DISORDER INVOLVING THROMBOCYTOPENIA (CMS-HCC): Chronic | Status: ACTIVE | Noted: 2023-02-13

## 2024-04-27 PROBLEM — E11.3291 TYPE 2 DIABETES MELLITUS WITH RIGHT EYE AFFECTED BY MILD NONPROLIFERATIVE RETINOPATHY WITHOUT MACULAR EDEMA, WITHOUT LONG-TERM CURRENT USE OF INSULIN (MULTI): Status: ACTIVE | Noted: 2023-08-08

## 2024-04-27 PROBLEM — E78.5 HYPERLIPIDEMIA, UNSPECIFIED: Status: ACTIVE | Noted: 2023-03-01

## 2024-04-27 PROBLEM — F32.A DEPRESSIVE DISORDER: Status: ACTIVE | Noted: 2023-02-22

## 2024-04-27 LAB
BASOPHILS # BLD AUTO: 0.03 X10*3/UL (ref 0–0.1)
BASOPHILS NFR BLD AUTO: 0.6 %
CRP SERPL-MCNC: 0.21 MG/DL
EOSINOPHIL # BLD AUTO: 0.05 X10*3/UL (ref 0–0.7)
EOSINOPHIL NFR BLD AUTO: 1 %
ERYTHROCYTE [DISTWIDTH] IN BLOOD BY AUTOMATED COUNT: 16.8 % (ref 11.5–14.5)
ERYTHROCYTE [SEDIMENTATION RATE] IN BLOOD BY WESTERGREN METHOD: 16 MM/H (ref 0–30)
HCT VFR BLD AUTO: 39.5 % (ref 36–46)
HGB BLD-MCNC: 13.4 G/DL (ref 12–16)
IMM GRANULOCYTES # BLD AUTO: 0.01 X10*3/UL (ref 0–0.7)
IMM GRANULOCYTES NFR BLD AUTO: 0.2 % (ref 0–0.9)
LYMPHOCYTES # BLD AUTO: 2.05 X10*3/UL (ref 1.2–4.8)
LYMPHOCYTES NFR BLD AUTO: 42.7 %
MCH RBC QN AUTO: 26.5 PG (ref 26–34)
MCHC RBC AUTO-ENTMCNC: 33.9 G/DL (ref 32–36)
MCV RBC AUTO: 78 FL (ref 80–100)
MONOCYTES # BLD AUTO: 0.37 X10*3/UL (ref 0.1–1)
MONOCYTES NFR BLD AUTO: 7.7 %
NEUTROPHILS # BLD AUTO: 2.29 X10*3/UL (ref 1.2–7.7)
NEUTROPHILS NFR BLD AUTO: 47.8 %
NRBC BLD-RTO: 0 /100 WBCS (ref 0–0)
PLATELET # BLD AUTO: 176 X10*3/UL (ref 150–450)
RBC # BLD AUTO: 5.06 X10*6/UL (ref 4–5.2)
URATE SERPL-MCNC: 5.2 MG/DL (ref 2.3–6.7)
WBC # BLD AUTO: 4.8 X10*3/UL (ref 4.4–11.3)

## 2024-04-27 PROCEDURE — 99283 EMERGENCY DEPT VISIT LOW MDM: CPT

## 2024-04-27 PROCEDURE — 85025 COMPLETE CBC W/AUTO DIFF WBC: CPT

## 2024-04-27 PROCEDURE — 73562 X-RAY EXAM OF KNEE 3: CPT | Mod: LT

## 2024-04-27 PROCEDURE — 84550 ASSAY OF BLOOD/URIC ACID: CPT

## 2024-04-27 PROCEDURE — 99284 EMERGENCY DEPT VISIT MOD MDM: CPT

## 2024-04-27 PROCEDURE — 86140 C-REACTIVE PROTEIN: CPT

## 2024-04-27 PROCEDURE — 73562 X-RAY EXAM OF KNEE 3: CPT | Mod: LEFT SIDE | Performed by: RADIOLOGY

## 2024-04-27 PROCEDURE — 2500000001 HC RX 250 WO HCPCS SELF ADMINISTERED DRUGS (ALT 637 FOR MEDICARE OP): Mod: SE

## 2024-04-27 PROCEDURE — 85652 RBC SED RATE AUTOMATED: CPT

## 2024-04-27 PROCEDURE — 36415 COLL VENOUS BLD VENIPUNCTURE: CPT

## 2024-04-27 RX ORDER — TRAMADOL HYDROCHLORIDE 50 MG/1
50 TABLET ORAL ONCE
Status: COMPLETED | OUTPATIENT
Start: 2024-04-27 | End: 2024-04-27

## 2024-04-27 RX ADMIN — TRAMADOL HYDROCHLORIDE 50 MG: 50 TABLET, COATED ORAL at 11:03

## 2024-04-27 ASSESSMENT — LIFESTYLE VARIABLES
HAVE PEOPLE ANNOYED YOU BY CRITICIZING YOUR DRINKING: NO
EVER HAD A DRINK FIRST THING IN THE MORNING TO STEADY YOUR NERVES TO GET RID OF A HANGOVER: NO
HAVE YOU EVER FELT YOU SHOULD CUT DOWN ON YOUR DRINKING: NO
TOTAL SCORE: 0
EVER FELT BAD OR GUILTY ABOUT YOUR DRINKING: NO

## 2024-04-27 ASSESSMENT — COLUMBIA-SUICIDE SEVERITY RATING SCALE - C-SSRS
1. IN THE PAST MONTH, HAVE YOU WISHED YOU WERE DEAD OR WISHED YOU COULD GO TO SLEEP AND NOT WAKE UP?: NO
2. HAVE YOU ACTUALLY HAD ANY THOUGHTS OF KILLING YOURSELF?: NO
6. HAVE YOU EVER DONE ANYTHING, STARTED TO DO ANYTHING, OR PREPARED TO DO ANYTHING TO END YOUR LIFE?: NO

## 2024-04-27 ASSESSMENT — PAIN SCALES - GENERAL: PAINLEVEL_OUTOF10: 8

## 2024-04-27 ASSESSMENT — PAIN - FUNCTIONAL ASSESSMENT: PAIN_FUNCTIONAL_ASSESSMENT: 0-10

## 2024-04-27 ASSESSMENT — PAIN DESCRIPTION - LOCATION: LOCATION: KNEE

## 2024-04-27 ASSESSMENT — PAIN DESCRIPTION - ORIENTATION: ORIENTATION: LEFT

## 2024-04-27 NOTE — ED PROVIDER NOTES
"HPI   Chief Complaint   Patient presents with    Knee Pain       69 year old female with Past medical history of HTN, T2DM, HLD GERD, recurrent UTIs, alcohol use presenting today for left knee pain.  Notes that she has had left knee pain chronically for a few years.  Seen at  ER sometime last year and told it was arthritis.  Does note that she has had intermittent knee swelling for \"some time\".  Does note that for the past 2 weeks, she has had increased knee pain and swelling that is gradually worsened.  No fever or chills.  No issues with walking.  Has attempted stretches, icing, Tylenol for symptomatic control but notes it is worse.  No history of gout.                          [place] Coma Scale Score: 15                     Patient History   Past Medical History:   Diagnosis Date    Diabetes mellitus (Multi)     Hypertension      History reviewed. No pertinent surgical history.  No family history on file.  Social History     Tobacco Use    Smoking status: Every Day     Types: Cigarettes    Smokeless tobacco: Never   Substance Use Topics    Alcohol use: Yes    Drug use: Never       Physical Exam   ED Triage Vitals [04/27/24 0831]   Temperature Heart Rate Respirations BP   36 °C (96.8 °F) 87 18 107/69      Pulse Ox Temp src Heart Rate Source Patient Position   97 % -- -- --      BP Location FiO2 (%)     -- --       Physical Exam  Vitals and nursing note reviewed.   Constitutional:       General: She is not in acute distress.     Appearance: Normal appearance. She is not ill-appearing.   HENT:      Head: Normocephalic and atraumatic.      Right Ear: External ear normal.      Left Ear: External ear normal.      Nose: Nose normal. No congestion or rhinorrhea.      Mouth/Throat:      Mouth: Mucous membranes are moist.      Pharynx: Oropharynx is clear. No oropharyngeal exudate or posterior oropharyngeal erythema.   Eyes:      Extraocular Movements: Extraocular movements intact.      Conjunctiva/sclera: Conjunctivae " normal.      Pupils: Pupils are equal, round, and reactive to light.   Cardiovascular:      Rate and Rhythm: Normal rate and regular rhythm.      Heart sounds: Normal heart sounds.   Pulmonary:      Effort: No accessory muscle usage or respiratory distress.      Breath sounds: Normal breath sounds. No wheezing, rhonchi or rales.   Abdominal:      General: Abdomen is flat. Bowel sounds are normal. There is no distension.      Palpations: Abdomen is soft.      Tenderness: There is no abdominal tenderness. There is no right CVA tenderness or left CVA tenderness.   Musculoskeletal:         General: No swelling or deformity.      Cervical back: Normal range of motion and neck supple.      Left knee: Effusion and bony tenderness present. No erythema. Decreased range of motion. Tenderness present.      Right lower leg: No edema.      Left lower leg: No edema.        Legs:       Comments: L knee with mild joint effusion, Able to bear weight, able to walk on it, mild bony tenderness, no obvious laxity of knee   Skin:     General: Skin is warm and dry.      Capillary Refill: Capillary refill takes less than 2 seconds.   Neurological:      General: No focal deficit present.      Mental Status: She is alert and oriented to person, place, and time.      GCS: GCS eye subscore is 4. GCS verbal subscore is 5. GCS motor subscore is 6.      Cranial Nerves: Cranial nerves 2-12 are intact.      Sensory: No sensory deficit.      Motor: Motor function is intact. No weakness.   Psychiatric:         Mood and Affect: Mood and affect normal.         Speech: Speech normal.         Behavior: Behavior normal. Behavior is cooperative.         ED Course & MDM   ED Course as of 04/27/24 1239   Sat Apr 27, 2024   1148 CBC and Auto Differential(!)  No leukocytosis [ML]   1222 XR knee left 3 views  Mild osteoarthritis left knee similar to previous exam. [ML]   1230 Sed Rate: 16  wnl [ML]   1230 C-Reactive Protein: 0.21  wnl [ML]      ED Course User  Index  [ML] Priscilla Gonzalez PA-C         Diagnoses as of 04/27/24 1239   Chronic pain of left knee   Localized osteoarthritis of left knee       Medical Decision Making  69-year-old female presents today for left knee pain.  Reports that she was told she has arthritis on that knee and has a history of intermittent swelling.  Reports increased swelling over the past 2 weeks.  On my exam, mild effusion noted to the joint, no laxity to the joint, no acute trauma to the knee noted.  Able to bear weight and walk on it without issues or limping.  Mild pain on palpation to the knee.  Denies any fever or chills to be concern for septic arthritis.  Had a decision-making conversation with this patient about obtaining a joint aspiration.  Considering septic arthritis is less likely considering this is recurrent and no fever or warmth to the joint, patient is opting less intervention and checking lab work and x-ray for other etiology for swelling.  Sed rate and CRP are within normal limits.  CBC shows no leukocytosis.  Uric acid within normal limits, no concern for gout.  Reports no history of gout.  X-ray shows osteoarthritis in the knee.  Patient was given tramadol for pain but she reports the pain is persistent.  She reports an NSAID allergy.  Had decision-making conversation still opting to not do joint aspiration.  Discussed trial of lidocaine patch and sports medicine referral with strict return precautions.  Patient in agreement with plan.        Procedure  Procedures     Priscilla Gonzalez PA-C  04/27/24 1243

## 2024-05-01 ENCOUNTER — OFFICE VISIT (OUTPATIENT)
Dept: PRIMARY CARE | Facility: CLINIC | Age: 69
End: 2024-05-01
Payer: COMMERCIAL

## 2024-05-01 VITALS
OXYGEN SATURATION: 98 % | TEMPERATURE: 98.2 F | HEART RATE: 91 BPM | RESPIRATION RATE: 18 BRPM | SYSTOLIC BLOOD PRESSURE: 119 MMHG | DIASTOLIC BLOOD PRESSURE: 76 MMHG | WEIGHT: 159.2 LBS | BODY MASS INDEX: 24.99 KG/M2 | HEIGHT: 67 IN

## 2024-05-01 DIAGNOSIS — Z87.891 PERSONAL HISTORY OF NICOTINE DEPENDENCE: ICD-10-CM

## 2024-05-01 DIAGNOSIS — Z12.31 ENCOUNTER FOR SCREENING MAMMOGRAM FOR MALIGNANT NEOPLASM OF BREAST: ICD-10-CM

## 2024-05-01 DIAGNOSIS — M17.12 PRIMARY OSTEOARTHRITIS OF LEFT KNEE: ICD-10-CM

## 2024-05-01 DIAGNOSIS — Z13.9 SCREENING DUE: Primary | ICD-10-CM

## 2024-05-01 DIAGNOSIS — E11.3291 TYPE 2 DIABETES MELLITUS WITH RIGHT EYE AFFECTED BY MILD NONPROLIFERATIVE RETINOPATHY WITHOUT MACULAR EDEMA, WITHOUT LONG-TERM CURRENT USE OF INSULIN (MULTI): ICD-10-CM

## 2024-05-01 DIAGNOSIS — Z13.820 OSTEOPOROSIS SCREENING: ICD-10-CM

## 2024-05-01 DIAGNOSIS — I10 BENIGN ESSENTIAL HYPERTENSION: ICD-10-CM

## 2024-05-01 PROCEDURE — 3074F SYST BP LT 130 MM HG: CPT

## 2024-05-01 PROCEDURE — 4010F ACE/ARB THERAPY RXD/TAKEN: CPT

## 2024-05-01 PROCEDURE — G0009 ADMIN PNEUMOCOCCAL VACCINE: HCPCS

## 2024-05-01 PROCEDURE — 99397 PER PM REEVAL EST PAT 65+ YR: CPT

## 2024-05-01 PROCEDURE — 90677 PCV20 VACCINE IM: CPT

## 2024-05-01 PROCEDURE — 1125F AMNT PAIN NOTED PAIN PRSNT: CPT

## 2024-05-01 PROCEDURE — 1159F MED LIST DOCD IN RCRD: CPT

## 2024-05-01 PROCEDURE — 3044F HG A1C LEVEL LT 7.0%: CPT

## 2024-05-01 PROCEDURE — 3078F DIAST BP <80 MM HG: CPT

## 2024-05-01 RX ORDER — LIDOCAINE, MENTHOL 4; 1 G/100G; G/100G
1 PATCH TOPICAL EVERY 12 HOURS PRN
Qty: 30 PATCH | Refills: 1 | Status: SHIPPED | OUTPATIENT
Start: 2024-05-01

## 2024-05-01 RX ORDER — ACETAMINOPHEN 500 MG
1000 TABLET ORAL EVERY 6 HOURS PRN
Qty: 180 TABLET | Refills: 2 | Status: SHIPPED | OUTPATIENT
Start: 2024-05-01

## 2024-05-01 RX ORDER — ENALAPRIL MALEATE 10 MG/1
10 TABLET ORAL
Qty: 30 TABLET | Refills: 11 | Status: SHIPPED | OUTPATIENT
Start: 2024-05-01

## 2024-05-01 RX ORDER — ATORVASTATIN CALCIUM 40 MG/1
40 TABLET, FILM COATED ORAL DAILY
Qty: 30 TABLET | Refills: 11 | Status: SHIPPED | OUTPATIENT
Start: 2024-05-01

## 2024-05-01 ASSESSMENT — ENCOUNTER SYMPTOMS
ARTHRALGIAS: 1
DYSURIA: 0

## 2024-05-01 ASSESSMENT — PATIENT HEALTH QUESTIONNAIRE - PHQ9
1. LITTLE INTEREST OR PLEASURE IN DOING THINGS: NOT AT ALL
2. FEELING DOWN, DEPRESSED OR HOPELESS: NOT AT ALL
SUM OF ALL RESPONSES TO PHQ9 QUESTIONS 1 & 2: 0

## 2024-05-01 ASSESSMENT — PAIN SCALES - GENERAL: PAINLEVEL: 8

## 2024-05-01 NOTE — PROGRESS NOTES
Agree with Resident and/or Medical student plan for annual medicare wellness visit with routine labs and preventative screening in addition to PT and lidocaine patches for knee pain 2/2 to OA.     Patient discussed with attending, Dr. Duong Ricci MD  Family Medicine, PGY-3

## 2024-05-01 NOTE — PROGRESS NOTES
Subjective   Patient ID: Leni Berman is a 69 y.o. female with PMH of T2DM (A1c 6.8 2/22/23), HTN, HLD, recurrent UTIs?, AUD (still drinks but less), Depression (situational but resolved), GERD (resolved) who presents for Follow-up and Med Refill (tylenol).    HPI  # Medicare Wellness Questionnaire  - Age: 69 y.o.  - Gender: female   - During the past 4 weeks, how much of the been bothered by emotional problems such as feeling anxious, depressed, irritable, sad, or downhearted and blue? Not at all  - During the past 4 weeks, history of physical and emotional health limited your social activities with family, friends, neighbors, or groups? Not at all  - During the past 4 weeks, how much bodily pain have you generally had? Severe pain L knee pain  - During the past 4 weeks, was someone available to help you if you needed and wanted help?  (For example, if you felt very nervous, lonely, or blue; got sick and had to stay in bed; needed someone to talk to; needed help with daily chores; or needed help just taking care of yourself.) Yes, as much as I wanted daughter  - During the past 4 weeks, what was the hardest physical activity you could do for at least 2 minutes? Light  - ADLS/IADLs:  --- Can you get to places out of walking distance without help?  (For example, can you travel alone on buses or taxis, or drive your own car?) Yes  --- Can you go shopping for groceries or clothes without someone's help? Yes  --- Can you prepare your own meals? Yes  --- Can you do your housework without help? Yes  --- Because of any health problems, do you need the help of another person with your personal care needs such as eating, bathing, dressing, or getting around the house? No  --- Can you handle your own money without help? Yes  - During the past 4 weeks, how would you rate your health in general?Good  - How have things been going for you during the past 4 weeks? Pretty well  - Are you having difficulties driving your car? No  -  Do you always fasten your seatbelt when you are in a car? Yes, usually  - How often during the past 4 weeks have you been bothered by any of the following problems?  --- Falling or dizzy when standing up. Never  --- Sexual problems. Never  --- Trouble eating well. Never  --- Teeth or denture problems. Seldom, taking care of it with dentist  --- Problems using the telephone. Never  --- Tiredness or fatigue. Seldom  - Have you fallen 2 or more times in the past year? No  - Are you afraid of falling? No  - Are you a smoker? Yes, but I am not ready to quit  - During the past 4 weeks, how many drinks of wine, beer, or other alcoholic beverages did you have? No alcohol at all  - Do you exercise for about 20 minutes, 3 or more days a week? Yes, some of the time  - Have you been given any information to help you with the following:  --- Hazards in your house that might hurt you? No  --- Keeping track of your medications? Yes  - How often do you have trouble taking medicines the way you have been told to take them? I always take them as prescribed  - How confident are you that you can control and manage most of your health problems? Very confident  - What is your race?  (Check all that apply) Black or -American    Advance directives paperwork given, will schedule follow up appointment to establish POA.  Patient expressed wanting her daughter Luciana Berman [phone] to make decisions for her if need be.    Review of Systems   Genitourinary:  Negative for dysuria (Previous dysuria resolved).   Musculoskeletal:  Positive for arthralgias (L knee pain).       Current Outpatient Medications   Medication Instructions    atorvastatin (LIPITOR) 40 mg, oral, Daily    cyclobenzaprine (FLEXERIL) 10 mg, oral, Every 8 hours PRN    enalapril (VASOTEC) 10 mg, oral, Daily RT    hydrOXYzine HCL (ATARAX) 25 mg, oral, Every 6 hours    lidocaine HCL 4 % adhesive patch,medicated 1 patch, topical (top), Every 24 hours PRN     "lidocaine-menthol 4-1 % adhesive patch,medicated 1 each, topical (top), Every 12 hours PRN    magnesium oxide (MAG-OX) 400 mg, oral, Daily    metFORMIN (GLUCOPHAGE) 500 mg, oral, 2 times daily with meals    nitrofurantoin (MACRODANTIN) 50 mg, oral, Nightly       Objective     Vitals: /76 (BP Location: Left arm, Patient Position: Sitting, BP Cuff Size: Adult)   Pulse 91   Temp 36.8 °C (98.2 °F) (Temporal)   Resp 18   Ht 1.702 m (5' 7\")   Wt 72.2 kg (159 lb 3.2 oz)   SpO2 98%   BMI 24.93 kg/m²      Physical Exam  General: well-appearing, NAD  HEENT: NC/AT  Cardiac: rrr, no m/r/g  Lungs: normal work of breathing, CTAB  Abdomen: soft, non-tender, non-distended, BS present  Neuro: grossly intact  MSK: No peripheral edema, cyanosis, or pallor  Psych: no depression, anxiety    PHQ2:  Over the past 2 weeks, how often have you been bothered by any of the following problems?  Little interest or pleasure in doing things: Not at all  Feeling down, depressed, or hopeless: Not at all  Patient Health Questionnaire-2 Score: 0    Food insecurity:  Food Insecurity: Unknown (7/29/2020)    Received from [facility]    Hunger Vital Sign     Worried About Running Out of Food in the Last Year: Patient declined     Ran Out of Food in the Last Year: Patient declined       Assessment/Plan     # Routine Health Maintenance  IMMUNIZATIONS  Immunization History   Administered Date(s) Administered    [employer] SARS-CoV-2 Vaccination 05/28/2021    Moderna COVID-19 vaccine, bivalent, blue cap/gray label *Check age/dose* 12/16/2022    Moderna SARS-CoV-2 Vaccination 02/10/2022    Pneumococcal conjugate vaccine, 20-valent (PREVNAR 20) 05/01/2024     - Flu vaccine: recommended annually  - COVID vaccine: recommended completion of primary series and recommended boosters,   - Prevnar (PCV20): given today  - Tdap: next due 10/9/2025  - Lifetime HIV, HepC: negative 3/7/2024  - Lipid Panel : wnl 5/24/2023 -> not needed  - DM screening: A1c " 6.4 3/7/24 -> improving  - HTN screening: wnl today  - Tobacco Cessation: not interested 1 pack day ~since 20s,   - Last Dental: recommended follow up every 6mo - last month  - Last Eye exam: recommended follow up annually - 3 weeks ago  - Colonoscopy (45-75): Colonoscopy referral placed was due 4/26/2022  - Lung CA screening (50-80, >20py): ordered today, 50 pack-year  - Breast CA screening: Due 7/26/2024 ordered  - Osteoporosis (65+F): ordered    Problem List Items Addressed This Visit       Benign essential hypertension    Relevant Medications    enalapril (Vasotec) 10 mg tablet    Type 2 diabetes mellitus with right eye affected by mild nonproliferative retinopathy without macular edema, without long-term current use of insulin (Multi)    Relevant Medications    atorvastatin (Lipitor) 40 mg tablet     Other Visit Diagnoses       Screening due    -  Primary    Relevant Orders    Colonoscopy Screening; Average Risk Patient    BI mammo bilateral screening tomosynthesis    CT lung screening low dose    XR DEXA bone density    Primary osteoarthritis of left knee        Relevant Medications    lidocaine-menthol 4-1 % adhesive patch,medicated    Other Relevant Orders    Referral to Physical Therapy    Encounter for screening mammogram for malignant neoplasm of breast        Relevant Orders    BI mammo bilateral screening tomosynthesis    Personal history of nicotine dependence        Relevant Orders    CT lung screening low dose    Encounter for follow-up examination after completed treatment for conditions other than malignant neoplasm        Relevant Orders    XR DEXA bone density          Screening due  -     Colonoscopy Screening; Average Risk Patient; Future  -     BI mammo bilateral screening tomosynthesis; Future  -     CT lung screening low dose; Future  -     XR DEXA bone density; Future  Primary osteoarthritis of left knee  :: Recent ED visit with XR positive for mild OA, patient has allergy to NSAIDs, would  benefit from PT, and trial of lidocaine patch for pain in addition to tylenol  -     Referral to Physical Therapy; Future  -     lidocaine-menthol 4-1 % adhesive patch,medicated; Apply 1 each topically every 12 hours if needed (Left knee pain).  Encounter for screening mammogram for malignant neoplasm of breast  -     BI mammo bilateral screening tomosynthesis; Future  Personal history of nicotine dependence  -     CT lung screening low dose; Future  Type 2 diabetes mellitus with right eye affected by mild nonproliferative retinopathy without macular edema, without long-term current use of insulin (Multi)  -     atorvastatin (Lipitor) 40 mg tablet; Take 1 tablet (40 mg) by mouth once daily.  Benign essential hypertension  -     enalapril (Vasotec) 10 mg tablet; Take 1 tablet (10 mg) by mouth once daily.  Other orders  -     Pneumococcal conjugate vaccine, 20-valent (PREVNAR 20)  -  Follow-up in 1-2 month for advance directives paperwork completion  --- Patient wants her daughter Luciana to be her POA, will work on paperwork   ---- Updated patient numbers in chart:   [phone] [place]  [phone] Home (preference)    Attending Supervision: discussed with attending physician (cosigner listed on this note).    RTC in 1-2 months, or earlier as needed.    Plan preliminary until cosigned by attending physician.    Anabell Amanda M.D.  Family Medicine  PGY-1

## 2024-05-03 ENCOUNTER — APPOINTMENT (OUTPATIENT)
Dept: UROLOGY | Facility: HOSPITAL | Age: 69
End: 2024-05-03
Payer: COMMERCIAL

## 2024-05-06 NOTE — PROGRESS NOTES
"I reviewed the resident/fellow's documentation and discussed the patient with the resident/fellow. I agree with the resident/fellow's medical decision making as documented in their note with the exception/addition of the following:  For patient with known alcohol use disorder, consider addition of naltrexone to reduce episodes of drinking and amount per episode, and continued use of motivational interviewing to engage for further change.  A 69-year-old woman engaging in \"lower risk\" drinking would be consuming no more than 3 standard drinks per week, and not more than 1 per session.   Cara Watters MD      "

## 2024-05-08 ENCOUNTER — HOSPITAL ENCOUNTER (OUTPATIENT)
Dept: GASTROENTEROLOGY | Facility: HOSPITAL | Age: 69
Setting detail: OUTPATIENT SURGERY
Discharge: HOME | End: 2024-05-08
Payer: COMMERCIAL

## 2024-05-08 VITALS
HEIGHT: 67 IN | WEIGHT: 160 LBS | DIASTOLIC BLOOD PRESSURE: 79 MMHG | SYSTOLIC BLOOD PRESSURE: 140 MMHG | OXYGEN SATURATION: 94 % | RESPIRATION RATE: 22 BRPM | BODY MASS INDEX: 25.11 KG/M2 | TEMPERATURE: 98.1 F | HEART RATE: 82 BPM

## 2024-05-08 DIAGNOSIS — Z13.9 SCREENING DUE: ICD-10-CM

## 2024-05-08 DIAGNOSIS — K63.5 POLYP OF COLON, UNSPECIFIED PART OF COLON, UNSPECIFIED TYPE: Primary | ICD-10-CM

## 2024-05-08 LAB — GLUCOSE BLD MANUAL STRIP-MCNC: 117 MG/DL (ref 74–99)

## 2024-05-08 PROCEDURE — 3700000013 HC SEDATION LEVEL 5+ TIME - EACH ADDITIONAL 15 MINUTES

## 2024-05-08 PROCEDURE — 88305 TISSUE EXAM BY PATHOLOGIST: CPT | Mod: TC,91,SUR | Performed by: STUDENT IN AN ORGANIZED HEALTH CARE EDUCATION/TRAINING PROGRAM

## 2024-05-08 PROCEDURE — 7100000010 HC PHASE TWO TIME - EACH INCREMENTAL 1 MINUTE

## 2024-05-08 PROCEDURE — 7100000009 HC PHASE TWO TIME - INITIAL BASE CHARGE

## 2024-05-08 PROCEDURE — 99152 MOD SED SAME PHYS/QHP 5/>YRS: CPT | Performed by: STUDENT IN AN ORGANIZED HEALTH CARE EDUCATION/TRAINING PROGRAM

## 2024-05-08 PROCEDURE — 99153 MOD SED SAME PHYS/QHP EA: CPT | Performed by: STUDENT IN AN ORGANIZED HEALTH CARE EDUCATION/TRAINING PROGRAM

## 2024-05-08 PROCEDURE — 3700000012 HC SEDATION LEVEL 5+ TIME - INITIAL 15 MINUTES 5/> YEARS

## 2024-05-08 PROCEDURE — 82947 ASSAY GLUCOSE BLOOD QUANT: CPT

## 2024-05-08 PROCEDURE — 45385 COLONOSCOPY W/LESION REMOVAL: CPT | Performed by: STUDENT IN AN ORGANIZED HEALTH CARE EDUCATION/TRAINING PROGRAM

## 2024-05-08 PROCEDURE — 88305 TISSUE EXAM BY PATHOLOGIST: CPT | Performed by: STUDENT IN AN ORGANIZED HEALTH CARE EDUCATION/TRAINING PROGRAM

## 2024-05-08 PROCEDURE — 2720000007 HC OR 272 NO HCPCS

## 2024-05-08 PROCEDURE — 2500000004 HC RX 250 GENERAL PHARMACY W/ HCPCS (ALT 636 FOR OP/ED): Mod: SE | Performed by: STUDENT IN AN ORGANIZED HEALTH CARE EDUCATION/TRAINING PROGRAM

## 2024-05-08 RX ORDER — MIDAZOLAM HYDROCHLORIDE 1 MG/ML
INJECTION, SOLUTION INTRAMUSCULAR; INTRAVENOUS AS NEEDED
Status: COMPLETED | OUTPATIENT
Start: 2024-05-08 | End: 2024-05-08

## 2024-05-08 RX ORDER — SODIUM CHLORIDE, SODIUM LACTATE, POTASSIUM CHLORIDE, CALCIUM CHLORIDE 600; 310; 30; 20 MG/100ML; MG/100ML; MG/100ML; MG/100ML
20 INJECTION, SOLUTION INTRAVENOUS CONTINUOUS
Status: DISCONTINUED | OUTPATIENT
Start: 2024-05-08 | End: 2024-05-09 | Stop reason: HOSPADM

## 2024-05-08 RX ORDER — FENTANYL CITRATE 50 UG/ML
INJECTION, SOLUTION INTRAMUSCULAR; INTRAVENOUS AS NEEDED
Status: COMPLETED | OUTPATIENT
Start: 2024-05-08 | End: 2024-05-08

## 2024-05-08 RX ADMIN — MIDAZOLAM 1 MG: 1 INJECTION INTRAMUSCULAR; INTRAVENOUS at 14:49

## 2024-05-08 RX ADMIN — MIDAZOLAM 2 MG: 1 INJECTION INTRAMUSCULAR; INTRAVENOUS at 14:39

## 2024-05-08 RX ADMIN — FENTANYL CITRATE 25 MCG: 50 INJECTION, SOLUTION INTRAMUSCULAR; INTRAVENOUS at 14:49

## 2024-05-08 RX ADMIN — FENTANYL CITRATE 50 MCG: 50 INJECTION, SOLUTION INTRAMUSCULAR; INTRAVENOUS at 14:39

## 2024-05-08 ASSESSMENT — ENCOUNTER SYMPTOMS
COLOR CHANGE: 0
ABDOMINAL PAIN: 0
RECTAL PAIN: 0
DIARRHEA: 0
SHORTNESS OF BREATH: 0
UNEXPECTED WEIGHT CHANGE: 0
VOMITING: 0
CONSTIPATION: 0
TROUBLE SWALLOWING: 0
FEVER: 0
NAUSEA: 0
ANAL BLEEDING: 0
ABDOMINAL DISTENTION: 0
CHILLS: 0
BLOOD IN STOOL: 0

## 2024-05-08 ASSESSMENT — PAIN SCALES - GENERAL
PAINLEVEL_OUTOF10: 0 - NO PAIN

## 2024-05-08 ASSESSMENT — PAIN - FUNCTIONAL ASSESSMENT

## 2024-05-08 NOTE — H&P
History Of Present Illness  Leni Berman is a 69 y.o. female presenting with screening colonoscopy      Past Medical History  Past Medical History:   Diagnosis Date    Diabetes mellitus (Multi)     Hypertension      Surgical History  History reviewed. No pertinent surgical history.  Social History  She reports that she has been smoking cigarettes. She has never used smokeless tobacco. She reports current alcohol use. She reports that she does not use drugs.    Family History  No family history on file.     Allergies  Allergies   Allergen Reactions    Fish Containing Products Swelling    Penicillins Hives, Itching, Rash and Swelling     Pt said she last had penicillin over 10 years ago, and she developed a rash on her skin. She denies ever having an anaphylactic reaction, or having any airway swelling. TOLERATED CEFTRIAXONE WITH NO ADVERSE EVENTS    Shellfish Containing Products Rash    Fish Derived Swelling    Ibuprofen Swelling     Review of Systems   Constitutional:  Negative for chills, fever and unexpected weight change.   HENT:  Negative for trouble swallowing.    Respiratory:  Negative for shortness of breath.    Cardiovascular:  Negative for chest pain.   Gastrointestinal:  Negative for abdominal distention, abdominal pain, anal bleeding, blood in stool, constipation, diarrhea, nausea, rectal pain and vomiting.   Skin:  Negative for color change.     Pre-sedation Evaluation:  ASA Classification - ASA 2 - Patient with mild systemic disease with no functional limitations  Mallampati Score - II (hard and soft palate, upper portion of tonsils and uvula visible)    Physical Exam  Constitutional:       General: She is awake.      Appearance: Normal appearance.   HENT:      Head: Normocephalic and atraumatic.      Nose: Nose normal.      Mouth/Throat:      Mouth: Mucous membranes are moist.   Eyes:      Pupils: Pupils are equal, round, and reactive to light.   Pulmonary:      Effort: Pulmonary effort is normal.  "  Neurological:      Mental Status: She is alert and oriented to person, place, and time. Mental status is at baseline.   Psychiatric:         Attention and Perception: Attention and perception normal.         Mood and Affect: Mood normal.         Behavior: Behavior normal.          Last Recorded Vitals  Blood pressure 117/73, pulse 73, temperature 36.7 °C (98.1 °F), resp. rate 18, height 1.702 m (5' 7\"), weight 72.6 kg (160 lb), SpO2 99%.     Assessment/Plan   Screening colonoscopy      PTA/Current Medications:  (Not in a hospital admission)    Current Outpatient Medications   Medication Sig Dispense Refill    acetaminophen (Tylenol Extra Strength) 500 mg tablet Take 2 tablets (1,000 mg) by mouth every 6 hours if needed for mild pain (1 - 3) or moderate pain (4 - 6). 180 tablet 2    atorvastatin (Lipitor) 40 mg tablet Take 1 tablet (40 mg) by mouth once daily. 30 tablet 11    enalapril (Vasotec) 10 mg tablet Take 1 tablet (10 mg) by mouth once daily. 30 tablet 11    lidocaine-menthol 4-1 % adhesive patch,medicated Apply 1 each topically every 12 hours if needed (Left knee pain). 30 patch 1    magnesium oxide (Mag-Ox) 400 mg (241.3 mg magnesium) tablet Take 1 tablet (400 mg) by mouth once daily.      metFORMIN (Glucophage) 500 mg tablet Take 1 tablet (500 mg) by mouth 2 times a day with meals.      cyclobenzaprine (Flexeril) 10 mg tablet Take 1 tablet (10 mg) by mouth every 8 hours if needed for muscle spasms.      hydrOXYzine HCL (Atarax) 25 mg tablet Take 1 tablet (25 mg) by mouth every 6 hours for 10 doses. 10 tablet 0    lidocaine HCL 4 % adhesive patch,medicated Apply 1 patch topically every 24 (twenty four) hours if needed (knee pain). (Patient not taking: Reported on 5/8/2024) 30 patch 0    nitrofurantoin (Macrodantin) 50 mg capsule Take 1 capsule (50 mg) by mouth once daily at bedtime. (Patient not taking: Reported on 5/8/2024) 30 capsule 11     Current Facility-Administered Medications   Medication Dose " Route Frequency Provider Last Rate Last Admin    lactated Ringer's infusion  20 mL/hr intravenous Continuous MD Negar Kelley MD

## 2024-05-09 ENCOUNTER — OFFICE VISIT (OUTPATIENT)
Dept: UROLOGY | Facility: CLINIC | Age: 69
End: 2024-05-09
Payer: COMMERCIAL

## 2024-05-09 DIAGNOSIS — N39.3 STRESS INCONTINENCE OF URINE: Primary | ICD-10-CM

## 2024-05-09 LAB
POC APPEARANCE, URINE: CLEAR
POC BILIRUBIN, URINE: NEGATIVE
POC BLOOD, URINE: NEGATIVE
POC COLOR, URINE: YELLOW
POC GLUCOSE, URINE: NEGATIVE MG/DL
POC KETONES, URINE: NEGATIVE MG/DL
POC LEUKOCYTES, URINE: NEGATIVE
POC NITRITE,URINE: NEGATIVE
POC PH, URINE: 6 PH
POC PROTEIN, URINE: NEGATIVE MG/DL
POC SPECIFIC GRAVITY, URINE: >=1.03
POC UROBILINOGEN, URINE: 0.2 EU/DL

## 2024-05-09 PROCEDURE — 99213 OFFICE O/P EST LOW 20 MIN: CPT | Performed by: PHYSICIAN ASSISTANT

## 2024-05-09 PROCEDURE — 81003 URINALYSIS AUTO W/O SCOPE: CPT | Mod: QW | Performed by: PHYSICIAN ASSISTANT

## 2024-05-09 PROCEDURE — 1159F MED LIST DOCD IN RCRD: CPT | Performed by: PHYSICIAN ASSISTANT

## 2024-05-09 PROCEDURE — 4010F ACE/ARB THERAPY RXD/TAKEN: CPT | Performed by: PHYSICIAN ASSISTANT

## 2024-05-09 PROCEDURE — 3044F HG A1C LEVEL LT 7.0%: CPT | Performed by: PHYSICIAN ASSISTANT

## 2024-05-09 ASSESSMENT — ENCOUNTER SYMPTOMS
CARDIOVASCULAR NEGATIVE: 1
ALLERGIC/IMMUNOLOGIC NEGATIVE: 1
MUSCULOSKELETAL NEGATIVE: 1
GASTROINTESTINAL NEGATIVE: 1
HEMATOLOGIC/LYMPHATIC NEGATIVE: 1
ENDOCRINE NEGATIVE: 1
EYES NEGATIVE: 1
CONSTITUTIONAL NEGATIVE: 1
NEUROLOGICAL NEGATIVE: 1
PSYCHIATRIC NEGATIVE: 1
RESPIRATORY NEGATIVE: 1

## 2024-05-09 NOTE — PROGRESS NOTES
Subjective   Patient ID: Leni Berman is a 69 y.o. female who presents for No chief complaint on file..  HPI  Patient is a 68 yo female with DM type 2, HTN, questionable recurrent UTIs, OAB presents for follow up,    Patient reports urinary frequency and urgency about every hours day and night. She reports urge incontinence when she waits too long. She denies hematuria or dysuria evaluation.    Patient reports intermittent low back pain which she previously associated with a UTI. All urine cultures has been negative.         Review of Systems   Constitutional: Negative.    HENT: Negative.     Eyes: Negative.    Respiratory: Negative.     Cardiovascular: Negative.    Gastrointestinal: Negative.    Endocrine: Negative.    Genitourinary: Negative.    Musculoskeletal: Negative.    Skin: Negative.    Allergic/Immunologic: Negative.    Neurological: Negative.    Hematological: Negative.    Psychiatric/Behavioral: Negative.         Objective   Physical Exam  Constitutional:       General: She is not in acute distress.     Appearance: Normal appearance.   HENT:      Head: Normocephalic and atraumatic.      Nose: Nose normal.      Mouth/Throat:      Mouth: Mucous membranes are dry.   Cardiovascular:      Rate and Rhythm: Normal rate.   Pulmonary:      Effort: Pulmonary effort is normal.   Abdominal:      General: Abdomen is flat.      Palpations: Abdomen is soft.   Musculoskeletal:         General: Normal range of motion.      Cervical back: Normal range of motion and neck supple.   Skin:     General: Skin is warm and dry.   Neurological:      General: No focal deficit present.      Mental Status: She is alert and oriented to person, place, and time.   Psychiatric:         Mood and Affect: Mood normal.       Assessment/Plan     I discussed urine culture from recent urgent care visit was negative for a UTI.  I advised against taking Macrobid post cotidal.   I discussed urine symptoms are most likely related to OAB.   Today  we discussed medication therapy to help alleviate symptoms secondary to OAB. We discussed in detail that first line treatment for OAB is behavioral therapy and initiating a medication does not replace behavioral therapy, but should work in conjunction with one another. The mechanism of action as well as the risks, benefits, common side effects, and alternatives to all prescribed medications were discussed with the patient at length. The patient had the opportunity to ask questions and all questions were answered. I primarily discussed the use of anticholinergic and Beta 3 agonists. Additionally, we discussed the possible need for minimally invasive treatment including  PTNS, Botox or Interstim.     Patient would like to be observed since her symptoms are not bothersome.     Follow up in 6 months or sooner as needed.       Tim Crystal PA-C 05/09/24 10:19 AM

## 2024-05-16 LAB
LABORATORY COMMENT REPORT: NORMAL
PATH REPORT.FINAL DX SPEC: NORMAL
PATH REPORT.GROSS SPEC: NORMAL
PATH REPORT.TOTAL CANCER: NORMAL

## 2024-05-20 ENCOUNTER — HOSPITAL ENCOUNTER (OUTPATIENT)
Dept: RADIOLOGY | Facility: HOSPITAL | Age: 69
Discharge: HOME | End: 2024-05-20
Payer: COMMERCIAL

## 2024-05-20 ENCOUNTER — DOCUMENTATION (OUTPATIENT)
Dept: PEDIATRICS | Facility: CLINIC | Age: 69
End: 2024-05-20
Payer: COMMERCIAL

## 2024-05-20 DIAGNOSIS — Z87.891 PERSONAL HISTORY OF NICOTINE DEPENDENCE: ICD-10-CM

## 2024-05-20 DIAGNOSIS — Z91.89 HIGH RISK FOR COLON CANCER: Primary | ICD-10-CM

## 2024-05-20 DIAGNOSIS — R91.8 GROUND GLASS OPACITY PRESENT ON IMAGING OF LUNG: ICD-10-CM

## 2024-05-20 DIAGNOSIS — Z13.9 SCREENING DUE: ICD-10-CM

## 2024-05-20 PROCEDURE — 71271 CT THORAX LUNG CANCER SCR C-: CPT

## 2024-05-20 NOTE — PROGRESS NOTES
Called patient about test results:    1) colonoscopy results  Advised that they found multiple polyps and will need to repeat Colonoscopy in 1 year (5/8/2025) with extensive 2 bowel preps.  GI had also referred to Genetics and patient has an appointment scheduled for 09/03/24.  - Order placed for colonoscopy in 1 year on 5/8/2025    2) Low dose CT scan 5/20/24  Informed patient that there are ground-glass/soft tissue opacity in the right lower lobe and Left subpleural atelectasis/scarring which require follow-up in 6 months  - Order placed for repeat low dose CT scan in 6 month on 11/21/24    Otherwise patient has mammography scheduled on 5/24/24.    Anabell Amanda M.D.  Family Medicine  PGY-1

## 2024-06-14 ENCOUNTER — LAB (OUTPATIENT)
Dept: LAB | Facility: LAB | Age: 69
End: 2024-06-14
Payer: COMMERCIAL

## 2024-06-14 ENCOUNTER — HOSPITAL ENCOUNTER (EMERGENCY)
Facility: HOSPITAL | Age: 69
Discharge: HOME | End: 2024-06-14
Payer: COMMERCIAL

## 2024-06-14 DIAGNOSIS — K21.9 GASTRO-ESOPHAGEAL REFLUX DISEASE WITHOUT ESOPHAGITIS: Primary | ICD-10-CM

## 2024-06-14 DIAGNOSIS — I10 ESSENTIAL (PRIMARY) HYPERTENSION: ICD-10-CM

## 2024-06-14 DIAGNOSIS — E11.65 TYPE 2 DIABETES MELLITUS WITH HYPERGLYCEMIA (MULTI): ICD-10-CM

## 2024-06-14 LAB
25(OH)D3 SERPL-MCNC: 22 NG/ML (ref 30–100)
ALBUMIN SERPL BCP-MCNC: 4.3 G/DL (ref 3.4–5)
ALP SERPL-CCNC: 72 U/L (ref 33–136)
ALT SERPL W P-5'-P-CCNC: 32 U/L (ref 7–45)
ANION GAP SERPL CALC-SCNC: 13 MMOL/L (ref 10–20)
AST SERPL W P-5'-P-CCNC: 30 U/L (ref 9–39)
BASOPHILS # BLD AUTO: 0.04 X10*3/UL (ref 0–0.1)
BASOPHILS NFR BLD AUTO: 0.6 %
BILIRUB SERPL-MCNC: 0.5 MG/DL (ref 0–1.2)
BNP SERPL-MCNC: 29 PG/ML (ref 0–99)
BUN SERPL-MCNC: 10 MG/DL (ref 6–23)
CALCIUM SERPL-MCNC: 9.4 MG/DL (ref 8.6–10.6)
CHLORIDE SERPL-SCNC: 105 MMOL/L (ref 98–107)
CHOLEST SERPL-MCNC: 150 MG/DL (ref 0–199)
CHOLESTEROL/HDL RATIO: 2
CO2 SERPL-SCNC: 28 MMOL/L (ref 21–32)
CREAT SERPL-MCNC: 0.71 MG/DL (ref 0.5–1.05)
EGFRCR SERPLBLD CKD-EPI 2021: >90 ML/MIN/1.73M*2
EOSINOPHIL # BLD AUTO: 0.05 X10*3/UL (ref 0–0.7)
EOSINOPHIL NFR BLD AUTO: 0.8 %
ERYTHROCYTE [DISTWIDTH] IN BLOOD BY AUTOMATED COUNT: 17.2 % (ref 11.5–14.5)
EST. AVERAGE GLUCOSE BLD GHB EST-MCNC: 131 MG/DL
GLUCOSE SERPL-MCNC: 99 MG/DL (ref 74–99)
HBA1C MFR BLD: 6.2 %
HCT VFR BLD AUTO: 38.4 % (ref 36–46)
HDLC SERPL-MCNC: 75.7 MG/DL
HGB BLD-MCNC: 12.2 G/DL (ref 12–16)
IMM GRANULOCYTES # BLD AUTO: 0.03 X10*3/UL (ref 0–0.7)
IMM GRANULOCYTES NFR BLD AUTO: 0.5 % (ref 0–0.9)
LDLC SERPL CALC-MCNC: 55 MG/DL
LYMPHOCYTES # BLD AUTO: 2.92 X10*3/UL (ref 1.2–4.8)
LYMPHOCYTES NFR BLD AUTO: 45.3 %
MCH RBC QN AUTO: 25.8 PG (ref 26–34)
MCHC RBC AUTO-ENTMCNC: 31.8 G/DL (ref 32–36)
MCV RBC AUTO: 81 FL (ref 80–100)
MONOCYTES # BLD AUTO: 0.56 X10*3/UL (ref 0.1–1)
MONOCYTES NFR BLD AUTO: 8.7 %
NEUTROPHILS # BLD AUTO: 2.84 X10*3/UL (ref 1.2–7.7)
NEUTROPHILS NFR BLD AUTO: 44.1 %
NON HDL CHOLESTEROL: 74 MG/DL (ref 0–149)
NRBC BLD-RTO: 0 /100 WBCS (ref 0–0)
PLATELET # BLD AUTO: 178 X10*3/UL (ref 150–450)
POTASSIUM SERPL-SCNC: 4.2 MMOL/L (ref 3.5–5.3)
PROT SERPL-MCNC: 6.4 G/DL (ref 6.4–8.2)
PTH-INTACT SERPL-MCNC: 68.1 PG/ML (ref 18.5–88)
RBC # BLD AUTO: 4.72 X10*6/UL (ref 4–5.2)
SODIUM SERPL-SCNC: 142 MMOL/L (ref 136–145)
TRIGL SERPL-MCNC: 97 MG/DL (ref 0–149)
VLDL: 19 MG/DL (ref 0–40)
WBC # BLD AUTO: 6.4 X10*3/UL (ref 4.4–11.3)

## 2024-06-14 PROCEDURE — 99281 EMR DPT VST MAYX REQ PHY/QHP: CPT

## 2024-06-14 PROCEDURE — 99283 EMERGENCY DEPT VISIT LOW MDM: CPT

## 2024-06-14 PROCEDURE — 82306 VITAMIN D 25 HYDROXY: CPT | Mod: WAIVER OF LIABILITY ON FILE

## 2024-06-14 PROCEDURE — 83036 HEMOGLOBIN GLYCOSYLATED A1C: CPT

## 2024-06-14 PROCEDURE — 83880 ASSAY OF NATRIURETIC PEPTIDE: CPT | Mod: WAIVER OF LIABILITY ON FILE

## 2024-06-14 PROCEDURE — 80053 COMPREHEN METABOLIC PANEL: CPT

## 2024-06-14 PROCEDURE — 85025 COMPLETE CBC W/AUTO DIFF WBC: CPT

## 2024-06-14 PROCEDURE — 36415 COLL VENOUS BLD VENIPUNCTURE: CPT

## 2024-06-14 PROCEDURE — 83970 ASSAY OF PARATHORMONE: CPT

## 2024-06-14 PROCEDURE — 80061 LIPID PANEL: CPT

## 2024-06-20 ENCOUNTER — APPOINTMENT (OUTPATIENT)
Dept: UROLOGY | Facility: CLINIC | Age: 69
End: 2024-06-20
Payer: COMMERCIAL

## 2024-07-18 ENCOUNTER — APPOINTMENT (OUTPATIENT)
Dept: RADIOLOGY | Facility: CLINIC | Age: 69
End: 2024-07-18
Payer: COMMERCIAL

## 2024-07-24 ENCOUNTER — CLINICAL SUPPORT (OUTPATIENT)
Dept: EMERGENCY MEDICINE | Facility: HOSPITAL | Age: 69
End: 2024-07-24
Payer: COMMERCIAL

## 2024-07-24 ENCOUNTER — HOSPITAL ENCOUNTER (OUTPATIENT)
Dept: RADIOLOGY | Facility: HOSPITAL | Age: 69
Discharge: HOME | End: 2024-07-24
Payer: COMMERCIAL

## 2024-07-24 ENCOUNTER — HOSPITAL ENCOUNTER (EMERGENCY)
Facility: HOSPITAL | Age: 69
Discharge: HOME | End: 2024-07-24
Attending: EMERGENCY MEDICINE
Payer: COMMERCIAL

## 2024-07-24 VITALS — BODY MASS INDEX: 25.12 KG/M2 | HEIGHT: 67 IN | WEIGHT: 160.05 LBS

## 2024-07-24 VITALS
SYSTOLIC BLOOD PRESSURE: 117 MMHG | DIASTOLIC BLOOD PRESSURE: 71 MMHG | WEIGHT: 160 LBS | BODY MASS INDEX: 25.11 KG/M2 | HEART RATE: 80 BPM | RESPIRATION RATE: 16 BRPM | HEIGHT: 67 IN | TEMPERATURE: 98.7 F | OXYGEN SATURATION: 97 %

## 2024-07-24 DIAGNOSIS — Z12.31 ENCOUNTER FOR SCREENING MAMMOGRAM FOR MALIGNANT NEOPLASM OF BREAST: ICD-10-CM

## 2024-07-24 DIAGNOSIS — R51.9 ACUTE NONINTRACTABLE HEADACHE, UNSPECIFIED HEADACHE TYPE: Primary | ICD-10-CM

## 2024-07-24 DIAGNOSIS — Z13.9 SCREENING DUE: ICD-10-CM

## 2024-07-24 DIAGNOSIS — R20.2 PARESTHESIAS: ICD-10-CM

## 2024-07-24 LAB
ATRIAL RATE: 75 BPM
GLUCOSE BLD MANUAL STRIP-MCNC: 115 MG/DL (ref 74–99)
P AXIS: 51 DEGREES
P OFFSET: 200 MS
P ONSET: 138 MS
PR INTERVAL: 170 MS
Q ONSET: 223 MS
QRS COUNT: 12 BEATS
QRS DURATION: 80 MS
QT INTERVAL: 388 MS
QTC CALCULATION(BAZETT): 433 MS
QTC FREDERICIA: 417 MS
R AXIS: -26 DEGREES
SARS-COV-2 RNA RESP QL NAA+PROBE: NOT DETECTED
T AXIS: 20 DEGREES
T OFFSET: 417 MS
VENTRICULAR RATE: 75 BPM

## 2024-07-24 PROCEDURE — 82947 ASSAY GLUCOSE BLOOD QUANT: CPT

## 2024-07-24 PROCEDURE — 99284 EMERGENCY DEPT VISIT MOD MDM: CPT | Performed by: EMERGENCY MEDICINE

## 2024-07-24 PROCEDURE — 87635 SARS-COV-2 COVID-19 AMP PRB: CPT | Performed by: EMERGENCY MEDICINE

## 2024-07-24 PROCEDURE — 93005 ELECTROCARDIOGRAM TRACING: CPT

## 2024-07-24 PROCEDURE — 77067 SCR MAMMO BI INCL CAD: CPT

## 2024-07-24 PROCEDURE — 99283 EMERGENCY DEPT VISIT LOW MDM: CPT

## 2024-07-24 RX ORDER — ACETAMINOPHEN 325 MG/1
975 TABLET ORAL ONCE
Status: COMPLETED | OUTPATIENT
Start: 2024-07-24 | End: 2024-07-24

## 2024-07-24 RX ADMIN — ACETAMINOPHEN 975 MG: 325 TABLET ORAL at 10:29

## 2024-07-24 ASSESSMENT — PAIN SCALES - GENERAL
PAINLEVEL_OUTOF10: 7
PAINLEVEL_OUTOF10: 4

## 2024-07-24 ASSESSMENT — PAIN - FUNCTIONAL ASSESSMENT: PAIN_FUNCTIONAL_ASSESSMENT: 0-10

## 2024-07-24 NOTE — ED PROVIDER NOTES
Limitations to History: None  Additional History Obtained from: None    HPI:    Patient is presenting to the emergency department for mammogram due to a frontal headache as well as bilateral hand and feet numbness.  Patient states that over the last several weeks she been having increasing episodes of bilateral hand and foot numbness.  She states it occurs spontaneously and resolves with getting up and ambulating.  Denies any focal weakness or difficulty with ambulation with this occurs.  Denies any associated headaches when this occurs.  She states that has since resolved at this time.  She states today while she was at her mammogram she began having a frontal headache, throbbing and nonradiating.  She denied maximal intensity at onset.  Denied any vomiting or focal weakness.  Denied changes in her hearing or vision.  Denies any recent falls or injuries or use of blood thinners.  Has had similar headaches previously that resolved with Tylenol.  Has not had anything to eat or drink today.  Denies any other exacerbating remitting factors to her symptoms.  Review of systems is otherwise negative.    ------------------------------------------------------------------------------------------------------------------------------------------  Physical Exam:    ED Triage Vitals [07/24/24 0951]   Temperature Heart Rate Respirations BP   37.1 °C (98.7 °F) 80 16 117/71      Pulse Ox Temp src Heart Rate Source Patient Position   97 % -- -- --      BP Location FiO2 (%)     -- --        VS: As documented in the triage note and EMR flowsheet from this visit were reviewed.  General: Well appearing. No acute distress.   Eyes: Pupils round and reactive. No scleral icterus. No conjunctival injection  HENT: Atraumatic. Normocephalic. Moist mucous membranes. Trachea midline; no nuchal rigidity or decreased ROM in  c spine  CV: RRR, No MRG. No pedal edema appreciated.  Resp: Clear to auscultation bilaterally. Non-labored.    GI: Soft,  nontender to palpation. Nondistended. No guarding, rigidity or rebound  Skin: Warm, dry, intact. No systemic rashes or lesions appreciated.  Extremities: No deformities or pain out of proportion; pulses intact   Neuro: Alert. No focal motor or sensory deficits observed. Speech fluent. Answers questions appropriately. Ambulated to room without assistance  Psych: Appropriate. Kempt.    ------------------------------------------------------------------------------------------------------------------------------------------    Medical Decision Making  Patient is presenting to the emergency department due to headache and bilateral hand and foot numbness.  On exam the patient is awake alert, well-appearing, hemodynamically stable.  She is neurovascularly intact in all 4 extremities.  She has a nonfocal neurologic exam.  Denying any high risk features to her headache.  Lower suspicion for intracranial process.  High suspicion for lack of food and dehydration causing the patient's symptoms this morning.  Her paresthesias are more consistent with a systemic process, concern for possible chronic hyperglycemia or uncontrolled hypertension leading to the patient's symptoms.  Discussed the need for outpatient management follow-up regarding this.  Patient requesting a COVID swab.  Will treat supportively, obtain an EKG and reevaluate.      External Records Reviewed: I reviewed recent and relevant outside records including: HIE/Community Record  Escalation of Care: Appropriate for Discharge per ED course/MDM    Objective Data  I have independently interpreted the following labs, imaging studies and MDM added to ED Course  Labs Reviewed   POCT GLUCOSE - Abnormal       Result Value    POCT Glucose 115 (*)    SARS-COV-2 PCR       No orders to display       ED Course  ED Course as of 07/24/24 1138   Wed Jul 24, 2024   1031 EKG reviewed and interpreted by me showing normal sinus rhythm at 75 bpm Normal intervals, T wave inversion in  lead V2 and III compared to previous EKG in 2022, T waves now inverted in V2 [LP]   1122 Pt feeling improved after tylenol and food; covid swab pending. Will dc home with family doctor follow up regarding paresthesias and return precautions given  [LP]      ED Course User Index  [LP] Sophia Paris DO         Diagnoses as of 07/24/24 1138   Acute nonintractable headache, unspecified headache type   Paresthesias       Procedure  Procedures    Disposition: discharged    Sophia Paris DO  Emergency Medicine  Medical Toxicology     Sophia Paris DO  07/24/24 1138

## 2024-07-24 NOTE — ED TRIAGE NOTES
States she began having a HA after her mammogram today. Also c/o intermittent numbness in both hands and feet for months. H/o diabetes and hypertension, states she didn't take her medications today.

## 2024-07-29 DIAGNOSIS — Z13.9 SCREENING DUE: Primary | ICD-10-CM

## 2024-07-29 DIAGNOSIS — Z12.31 ENCOUNTER FOR SCREENING MAMMOGRAM FOR MALIGNANT NEOPLASM OF BREAST: ICD-10-CM

## 2024-09-03 ENCOUNTER — APPOINTMENT (OUTPATIENT)
Dept: GENETICS | Facility: CLINIC | Age: 69
End: 2024-09-03
Payer: COMMERCIAL

## 2024-09-18 DIAGNOSIS — N39.0 RECURRENT UTI: ICD-10-CM

## 2024-09-25 ENCOUNTER — APPOINTMENT (OUTPATIENT)
Dept: NEUROLOGY | Facility: CLINIC | Age: 69
End: 2024-09-25
Payer: COMMERCIAL

## 2024-10-19 ENCOUNTER — HOSPITAL ENCOUNTER (EMERGENCY)
Facility: HOSPITAL | Age: 69
Discharge: HOME | End: 2024-10-19
Attending: STUDENT IN AN ORGANIZED HEALTH CARE EDUCATION/TRAINING PROGRAM
Payer: COMMERCIAL

## 2024-10-19 VITALS
RESPIRATION RATE: 18 BRPM | WEIGHT: 165 LBS | HEART RATE: 86 BPM | OXYGEN SATURATION: 100 % | BODY MASS INDEX: 25.9 KG/M2 | DIASTOLIC BLOOD PRESSURE: 88 MMHG | HEIGHT: 67 IN | SYSTOLIC BLOOD PRESSURE: 136 MMHG | TEMPERATURE: 97.5 F

## 2024-10-19 DIAGNOSIS — G89.29 BILATERAL CHRONIC KNEE PAIN: Primary | ICD-10-CM

## 2024-10-19 DIAGNOSIS — M25.562 BILATERAL CHRONIC KNEE PAIN: Primary | ICD-10-CM

## 2024-10-19 DIAGNOSIS — M25.561 BILATERAL CHRONIC KNEE PAIN: Primary | ICD-10-CM

## 2024-10-19 LAB
APPEARANCE UR: ABNORMAL
BILIRUB UR STRIP.AUTO-MCNC: NEGATIVE MG/DL
COLOR UR: YELLOW
GLUCOSE BLD MANUAL STRIP-MCNC: 108 MG/DL (ref 74–99)
GLUCOSE BLD MANUAL STRIP-MCNC: 179 MG/DL (ref 74–99)
GLUCOSE BLD MANUAL STRIP-MCNC: 258 MG/DL (ref 74–99)
GLUCOSE UR STRIP.AUTO-MCNC: NORMAL MG/DL
HOLD SPECIMEN: NORMAL
KETONES UR STRIP.AUTO-MCNC: NEGATIVE MG/DL
LEUKOCYTE ESTERASE UR QL STRIP.AUTO: NEGATIVE
NITRITE UR QL STRIP.AUTO: NEGATIVE
PH UR STRIP.AUTO: 5.5 [PH]
PROT UR STRIP.AUTO-MCNC: NEGATIVE MG/DL
RBC # UR STRIP.AUTO: NEGATIVE /UL
SP GR UR STRIP.AUTO: 1.02
UROBILINOGEN UR STRIP.AUTO-MCNC: NORMAL MG/DL

## 2024-10-19 PROCEDURE — 99283 EMERGENCY DEPT VISIT LOW MDM: CPT

## 2024-10-19 PROCEDURE — 99284 EMERGENCY DEPT VISIT MOD MDM: CPT | Performed by: STUDENT IN AN ORGANIZED HEALTH CARE EDUCATION/TRAINING PROGRAM

## 2024-10-19 PROCEDURE — 2500000004 HC RX 250 GENERAL PHARMACY W/ HCPCS (ALT 636 FOR OP/ED): Mod: SE

## 2024-10-19 PROCEDURE — 82947 ASSAY GLUCOSE BLOOD QUANT: CPT

## 2024-10-19 PROCEDURE — 2500000001 HC RX 250 WO HCPCS SELF ADMINISTERED DRUGS (ALT 637 FOR MEDICARE OP): Mod: SE

## 2024-10-19 PROCEDURE — 81003 URINALYSIS AUTO W/O SCOPE: CPT

## 2024-10-19 PROCEDURE — 96372 THER/PROPH/DIAG INJ SC/IM: CPT

## 2024-10-19 RX ORDER — IBUPROFEN 600 MG/1
600 TABLET ORAL EVERY 6 HOURS PRN
Qty: 120 TABLET | Refills: 0 | Status: SHIPPED | OUTPATIENT
Start: 2024-10-19 | End: 2024-10-24 | Stop reason: WASHOUT

## 2024-10-19 RX ORDER — KETOROLAC TROMETHAMINE 15 MG/ML
15 INJECTION, SOLUTION INTRAMUSCULAR; INTRAVENOUS ONCE
Status: COMPLETED | OUTPATIENT
Start: 2024-10-19 | End: 2024-10-19

## 2024-10-19 RX ORDER — ACETAMINOPHEN 325 MG/1
975 TABLET ORAL ONCE
Status: COMPLETED | OUTPATIENT
Start: 2024-10-19 | End: 2024-10-19

## 2024-10-19 RX ADMIN — KETOROLAC TROMETHAMINE 15 MG: 15 INJECTION, SOLUTION INTRAMUSCULAR; INTRAVENOUS at 06:19

## 2024-10-19 RX ADMIN — ACETAMINOPHEN 975 MG: 325 TABLET ORAL at 06:19

## 2024-10-19 ASSESSMENT — PAIN - FUNCTIONAL ASSESSMENT: PAIN_FUNCTIONAL_ASSESSMENT: 0-10

## 2024-10-19 ASSESSMENT — PAIN DESCRIPTION - LOCATION: LOCATION: KNEE

## 2024-10-19 ASSESSMENT — PAIN SCALES - GENERAL: PAINLEVEL_OUTOF10: 5 - MODERATE PAIN

## 2024-10-19 ASSESSMENT — PAIN DESCRIPTION - PAIN TYPE: TYPE: CHRONIC PAIN

## 2024-10-19 ASSESSMENT — PAIN DESCRIPTION - DESCRIPTORS: DESCRIPTORS: ACHING

## 2024-10-19 NOTE — ED TRIAGE NOTES
RODOLFO GORDON is a 69y old F with a PMHx significant for HTN, T2DM, HLD GERD, recurrent UTIs is presenting to [facility] ED with a chief concern for bilaterally knee pain and frontal headache. Knee pain is more chronic in nature, whereas, the frontal headaches started yesterday. Pt describes an aching/throbbing type pain that is 5/10.  She also endorses urinary frequency without trace of hematuria. Pt denies any active chest pain, abdominal pain or SOB. No fevers, chills or recent sick contacts. No change to bowel pattern. Allergies listed in EMR    Of note: Pt has a the following scheduled appointment    Urology EPV 11.14  Dexa Bone Density scheduled for 11.22

## 2024-10-19 NOTE — ED PROVIDER NOTES
History of Present Illness   History provided by: Patient  Limitations to History: None  External Records Reviewed with Brief Summary: Outpatient progress note from 5/1/2024 with her PCP which showed past medical history and most recent up-to-date medication list.    HPI:  Leni Berman is a 69 y.o. female with a past medical history of well-controlled type 2 diabetes, hypertension, hyperlipidemia and recurrent UTIs that presents to the emergency department today for bilateral knee pain. The patient states that when the weather turns she has pretty significant bilateral knee pain. She was diagnosed with arthritis last year and had x-rays performed at that time. She denies any trauma to her knees. She states that this feels like the typical pain she has when the weather gets cool. She did have a very mild frontal headache that started last night that has resolved after using Tylenol. Her last dose of Tylenol was last night but this did not help her knee pain. She also complains of some urinary frequency but states that she has had frequent previous UTIs. She denies any associated flank/abdominal pain, fevers, chills, nausea/vomiting or any other associated symptoms.    She does indicate that just PTA, she drank a cup of hot tea with a significant amount of sugar in it. She is requesting that we check her sugar, takes metformin daily, no missed doses though has not yet taken this AM.    Physical Exam   Triage vitals:  T 36.4 °C (97.5 °F)  HR 86  /88  RR 18  O2 100 % None (Room air)    Vital signs reviewed in nursing triage note, EMR flow sheets, and at patient's bedside.   General: Awake, alert, in no acute distress  Eyes: Gaze conjugate.  No scleral icterus or injection  HENT: Normo-cephalic, atraumatic. No stridor. No rhinorrhea or epistaxis.  CV: Regular rate and rhythm. No murmurs appreciated.  2+ DP pulses bilaterally.  Respiratory: Breathing non-labored, speaking in full sentences.  Lungs clear to  auscultation bilaterally.  GI: Soft, non-distended, non-tender. No rebound or guarding.  MSK/Extremities: No gross bony deformities. Moving all extremities. No lower extremity edema.  No significant swelling of the bilateral knees.  Full range of motion.  No overlying warmth to touch or erythema. No ballotable effusions.  Skin: Warm. Appropriate color  Neuro: Alert. Oriented. Face symmetric. Speech is fluent.  Gross strength and sensation intact in b/l UE and LEs  Psych: Appropriate mood and affect      Medical Decision Making & ED Course   Medical Decision Makin y.o. female with the above-stated past medical history that presents to the emergency department for knee pain. Upon arrival to the emergency department the patient had stable vital signs, was afebrile and saturating well on room air. History and physical exam are as above but notable for nontoxic-appearing patient in no acute distress. She has a nonfocal neurologic exam therefore CVA or ICH is on likely especially given her very mild headache that was slow in onset and now completely resolved. Her abdominal exam is benign and there is low suspicion for pyelonephritis but will obtain a UA to rule out urinary tract infection given she has urinary frequency. Point-of-care blood sugar was mildly hyperglycemic in the 250s which is abnormal for her but she states that she had a sugary drink prior to coming to the emergency department and there is low suspicion for DKA without abdominal pain/N/V or other symptoms. Will repeat the patient's blood sugar after oral rehydration, and came down to 170s and then 108. The patient's knee pain is most consistent with arthritis and there is low suspicion for septic arthritis or gout as the patient has full range of motion no overlying warmth or erythema and the symptoms are not consistent with this. She will be treated with a dose of Toradol as well as Tylenol and reassess. The patient was signed out to the [facility]  provider in stable condition pending UA, reassessment and final disposition.  See oncoming providers note for further details.    Differential diagnoses considered include but are not limited to: Arthritis, gout, septic arthritis, intracranial hemorrhage, UTI, pyelonephritis, DKA    ED Course:  ED Course as of 10/19/24 0740   Sat Oct 19, 2024   0711 POCT GLUCOSE(!) [RS]   0716 POCT Glucose(!): 179  Improved after patient given water and time after drank hot tea and sugar just prior to arrival [JH]      ED Course User Index  [JH] Justin Acosta MD  [RS] Bladimir Dee DO        Social Determinants of Health which Significantly Impact Care: None identified     EKG Independent Interpretation: EKG not obtained    Independent Result Review and Interpretation: Relevant laboratory and radiographic results were reviewed and independently interpreted by myself.  As necessary, they are commented on in the ED Course.    Chronic conditions affecting the patient's care: As documented in the HPI    The patient was discussed with the following consultants/services: None    Care Considerations: As documented above in MDM    Disposition   Patient was signed out to Tan Mckeon DO at 0700 pending completion of their work-up.  Please see the next provider's transition of care note for the remainder of the patient's care.     Procedures   Procedures    Patient seen and discussed with ED attending physician.    Bladimir Dee DO   Emergency Medicine, PGY-2     Disclaimer: This note was dictated by speech recognition. Minor errors in transcription may be present.     [unfilled] ? [employer] last updated 10/19/2024 6:11 AM        Bladimir Dee DO  Resident  10/19/24 0743       Justin Acosta MD  10/19/24 0809

## 2024-10-19 NOTE — PROGRESS NOTES
Emergency Department Transition of Care Note       Signout   I received Leni Berman in signout from Dr. Dee.  Please see the ED Provider Note for all HPI, PE and MDM up to the time of signout at 0700 hrs.  This is in addition to the primary record.    In brief Leni Berman is an 69 y.o. female presenting for bilateral knee pain     At the time of signout we were awaiting:  Urinalysis and pain control    ED Course & Medical Decision Making   Medical Decision Making:  Under my care,   Patient's urinalysis was negative for UTI.  Upon recheck, patient stated that she has absolutely no pain in her knees or legs after the Toradol injection.  Patient's vitals remained stable and within normal limits.  Patient states she was ready to go home.  Patient discharged in good condition with strict return precautions.  Patient understood and was agreeable with the plan.    ED Course:  ED Course as of 10/19/24 0936   Sat Oct 19, 2024   0711 POCT GLUCOSE(!) [RS]   0716 POCT Glucose(!): 179  Improved after patient given water and time after drank hot tea and sugar just prior to arrival [JH]      ED Course User Index  [JH] Justin Acosta MD  [RS] Bladimir Dee, DO         Diagnoses as of 10/19/24 0936   Bilateral chronic knee pain       Disposition   As a result of the work-up, the patient was discharged home.  she was informed of her diagnosis and instructed to come back with any concerns or worsening of condition.  she and was agreeable to the plan as discussed above.  she was given the opportunity to ask questions.  All of the patient's questions were answered.    Procedures   Procedures    Patient seen and discussed with ED attending physician.    Tan Mckeon, DO  Emergency Medicine

## 2024-10-19 NOTE — DISCHARGE INSTRUCTIONS
You are seen Emergency Department today for concerns of pain in your knees.  You were given Tylenol and Toradol in the emergency department, to which you responded well.  A prescription for ibuprofen has been sent to your pharmacy.  Please take as needed and as prescribed.  You are safely discharged at this time.  However, should you have any new, worsening, or concerning symptoms back to the emergency department.

## 2024-10-21 ENCOUNTER — APPOINTMENT (OUTPATIENT)
Dept: RADIOLOGY | Facility: HOSPITAL | Age: 69
End: 2024-10-21
Payer: COMMERCIAL

## 2024-10-21 ENCOUNTER — TELEPHONE (OUTPATIENT)
Dept: PRIMARY CARE | Facility: CLINIC | Age: 69
End: 2024-10-21
Payer: COMMERCIAL

## 2024-10-21 ENCOUNTER — HOSPITAL ENCOUNTER (EMERGENCY)
Facility: HOSPITAL | Age: 69
Discharge: HOME | End: 2024-10-21
Attending: EMERGENCY MEDICINE
Payer: COMMERCIAL

## 2024-10-21 VITALS
HEIGHT: 67 IN | TEMPERATURE: 98.6 F | DIASTOLIC BLOOD PRESSURE: 70 MMHG | OXYGEN SATURATION: 100 % | RESPIRATION RATE: 14 BRPM | WEIGHT: 165 LBS | HEART RATE: 88 BPM | SYSTOLIC BLOOD PRESSURE: 132 MMHG | BODY MASS INDEX: 25.9 KG/M2

## 2024-10-21 DIAGNOSIS — E11.3291 TYPE 2 DIABETES MELLITUS WITH RIGHT EYE AFFECTED BY MILD NONPROLIFERATIVE RETINOPATHY WITHOUT MACULAR EDEMA, WITHOUT LONG-TERM CURRENT USE OF INSULIN: ICD-10-CM

## 2024-10-21 DIAGNOSIS — M54.50 ACUTE MIDLINE LOW BACK PAIN WITHOUT SCIATICA: Primary | ICD-10-CM

## 2024-10-21 LAB
APPEARANCE UR: CLEAR
BILIRUB UR STRIP.AUTO-MCNC: NEGATIVE MG/DL
COLOR UR: YELLOW
GLUCOSE BLD MANUAL STRIP-MCNC: 106 MG/DL (ref 74–99)
GLUCOSE UR STRIP.AUTO-MCNC: NORMAL MG/DL
HOLD SPECIMEN: NORMAL
KETONES UR STRIP.AUTO-MCNC: NEGATIVE MG/DL
LEUKOCYTE ESTERASE UR QL STRIP.AUTO: NEGATIVE
NITRITE UR QL STRIP.AUTO: NEGATIVE
PH UR STRIP.AUTO: 6.5 [PH]
PROT UR STRIP.AUTO-MCNC: NEGATIVE MG/DL
RBC # UR STRIP.AUTO: NEGATIVE /UL
SP GR UR STRIP.AUTO: 1.01
UROBILINOGEN UR STRIP.AUTO-MCNC: NORMAL MG/DL

## 2024-10-21 PROCEDURE — 99284 EMERGENCY DEPT VISIT MOD MDM: CPT | Mod: 25

## 2024-10-21 PROCEDURE — 72131 CT LUMBAR SPINE W/O DYE: CPT

## 2024-10-21 PROCEDURE — 82947 ASSAY GLUCOSE BLOOD QUANT: CPT

## 2024-10-21 PROCEDURE — 2500000001 HC RX 250 WO HCPCS SELF ADMINISTERED DRUGS (ALT 637 FOR MEDICARE OP): Mod: SE

## 2024-10-21 PROCEDURE — 82043 UR ALBUMIN QUANTITATIVE: CPT

## 2024-10-21 PROCEDURE — 2500000005 HC RX 250 GENERAL PHARMACY W/O HCPCS: Mod: SE

## 2024-10-21 PROCEDURE — 83036 HEMOGLOBIN GLYCOSYLATED A1C: CPT

## 2024-10-21 PROCEDURE — 81003 URINALYSIS AUTO W/O SCOPE: CPT

## 2024-10-21 PROCEDURE — 72131 CT LUMBAR SPINE W/O DYE: CPT | Performed by: RADIOLOGY

## 2024-10-21 RX ORDER — ACETAMINOPHEN 500 MG
500 TABLET ORAL EVERY 6 HOURS PRN
Qty: 20 TABLET | Refills: 0 | Status: SHIPPED | OUTPATIENT
Start: 2024-10-21 | End: 2024-10-24

## 2024-10-21 RX ORDER — LIDOCAINE 50 MG/G
1 PATCH TOPICAL DAILY
Qty: 5 PATCH | Refills: 0 | Status: SHIPPED | OUTPATIENT
Start: 2024-10-21 | End: 2024-10-24 | Stop reason: WASHOUT

## 2024-10-21 RX ORDER — ACETAMINOPHEN 325 MG/1
650 TABLET ORAL ONCE
Status: COMPLETED | OUTPATIENT
Start: 2024-10-21 | End: 2024-10-21

## 2024-10-21 RX ORDER — ONDANSETRON 4 MG/1
4 TABLET, ORALLY DISINTEGRATING ORAL ONCE
Status: COMPLETED | OUTPATIENT
Start: 2024-10-21 | End: 2024-10-21

## 2024-10-21 ASSESSMENT — LIFESTYLE VARIABLES
HAVE YOU EVER FELT YOU SHOULD CUT DOWN ON YOUR DRINKING: NO
EVER FELT BAD OR GUILTY ABOUT YOUR DRINKING: NO
TOTAL SCORE: 0
EVER HAD A DRINK FIRST THING IN THE MORNING TO STEADY YOUR NERVES TO GET RID OF A HANGOVER: NO
HAVE PEOPLE ANNOYED YOU BY CRITICIZING YOUR DRINKING: NO

## 2024-10-21 ASSESSMENT — PAIN DESCRIPTION - LOCATION
LOCATION: ABDOMEN
LOCATION: BACK

## 2024-10-21 ASSESSMENT — PAIN SCALES - GENERAL
PAINLEVEL_OUTOF10: 10 - WORST POSSIBLE PAIN
PAINLEVEL_OUTOF10: 0 - NO PAIN
PAINLEVEL_OUTOF10: 2

## 2024-10-21 ASSESSMENT — PAIN DESCRIPTION - PAIN TYPE: TYPE: ACUTE PAIN

## 2024-10-21 ASSESSMENT — PAIN - FUNCTIONAL ASSESSMENT: PAIN_FUNCTIONAL_ASSESSMENT: 0-10

## 2024-10-21 NOTE — TELEPHONE ENCOUNTER
Patient called in and stated that she went t the ED on 10/19 and 10/20. Patient stated she believes she has a UTI but they never confirmed it for her in the ED. Patient is requesting a call back from Dr. Joel to go over her labs with her. Please advise. Thanks!

## 2024-10-21 NOTE — ED PROVIDER NOTES
HPI   Chief Complaint   Patient presents with    Back Pain       HPI    Leni Berman is a 69-year-old female with history of type 2 diabetes, hypertension, hyperlipidemia, and recurrent UTIs presented the ED with lower back pain.  Patient states she has had constant achy lower back pain for the past 2 days.  Patient states the back pain does not radiate and she has still been able to walk.  Patient denies falls and trauma to the back.  Patient states she has been having dysuria and increased urinary frequency since yesterday.  Patient states she had similar back pain previously and it was due to UTI. Patient states she began feeling nauseous this morning and is currently nauseous.  Patient denies vomiting.  Patient states that she did not take pain medications for the back pain but she did take a dose of an old prescription of Macrobid this morning.  Patient denies chest pain, shortness of breath, abdominal pain, fevers, body aches, chills.    Patient History   Past Medical History:   Diagnosis Date    Diabetes mellitus (Multi)     Hypertension      History reviewed. No pertinent surgical history.  No family history on file.  Social History     Tobacco Use    Smoking status: Every Day     Types: Cigarettes    Smokeless tobacco: Never   Substance Use Topics    Alcohol use: Yes    Drug use: Never       Physical Exam   ED Triage Vitals [10/21/24 0603]   Temperature Heart Rate Respirations BP   36.7 °C (98.1 °F) 74 18 131/78      Pulse Ox Temp Source Heart Rate Source Patient Position   100 % Oral Monitor Lying      BP Location FiO2 (%)     Right arm --       Physical Exam  Vitals and nursing note reviewed.   Constitutional:       Appearance: Normal appearance.   Cardiovascular:      Rate and Rhythm: Normal rate and regular rhythm.      Heart sounds: Normal heart sounds. No murmur heard.     No gallop.   Pulmonary:      Effort: Pulmonary effort is normal. No respiratory distress.      Breath sounds: Normal breath  sounds. No stridor. No wheezing, rhonchi or rales.   Abdominal:      General: Abdomen is flat. Bowel sounds are normal. There is no distension.      Palpations: Abdomen is soft. There is no mass.      Tenderness: There is no abdominal tenderness. There is no right CVA tenderness, left CVA tenderness, guarding or rebound.      Hernia: No hernia is present.   Musculoskeletal:      Cervical back: No swelling, tenderness, bony tenderness or crepitus. No pain with movement. Normal range of motion.      Thoracic back: No swelling, tenderness or bony tenderness. Normal range of motion.      Lumbar back: Bony tenderness present. No swelling or tenderness. Normal range of motion. Negative right straight leg raise test and negative left straight leg raise test.      Right lower leg: No edema.      Left lower leg: No edema.   Skin:     General: Skin is warm and dry.   Neurological:      General: No focal deficit present.      Mental Status: She is alert and oriented to person, place, and time.      Cranial Nerves: Cranial nerves 2-12 are intact. No dysarthria or facial asymmetry.      Sensory: Sensation is intact.      Motor: Motor function is intact. No tremor or pronator drift.      Coordination: Coordination normal. Finger-Nose-Finger Test and Heel to Shin Test normal. Rapid alternating movements normal.      Gait: Gait is intact.   Psychiatric:         Mood and Affect: Mood normal.         Behavior: Behavior normal.           ED Course & MDM   Diagnoses as of 10/21/24 1749   Acute midline low back pain without sciatica                 No data recorded     [place] Coma Scale Score: 15 (10/21/24 0620 : Priscilla Prieto, ARLEN)                           Medical Decision Making  This is a 69-year-old female presenting to the ED with lower back pain.  Patient states she has had constant achy lower back pain for the past 2 days.  Patient states the back pain does not radiate and she still has been able to walk.  Patient was seen walking  without complication while in the ED today.  On exam patient has lumbar bony midline tenderness without lumbar paraspinal muscle tenderness.  No CVA tenderness bilaterally.  No cervical or thoracic midline bony tenderness or paraspinal muscle tenderness.  Negative straight leg test bilaterally.  CT lumbar spine was obtained today with concern for spinal stenosis, disc herniation, lumbar spine fracture, osteoarthritis.  Patient denies recent surgeries therefore low suspicion for spinal abscess.  Patient states she has been having dysuria and increased urinary frequency since yesterday.  UA was obtained today to evaluate for UTI.  UA is unremarkable and does not show concern for UTI.  Point-of-care glucose was obtained and patient initially arrived to the ED and glucose is 106.  Given patient's presentation and point-of-care glucose there is low suspicion for DKA at this time.  CT lumbar spine shows multilevel degenerative changes with mild to moderate spinal canal stenosis and neuroforaminal narrowing most pronounced at L3-L4 and L4-L5 levels.  Discussed with patient the findings of his CT.  Patient was given Tylenol for pain and Zofran for nausea today.  Discussed with patient to stop taking the old prescription of Macrobid especially since she does not have a UTI.  Patient is appearing well on exam today and vital signs are stable with initial blood pressure of 131/78.  Given patient has had similar back pain previously as well as current presentation vital signs there is low suspicion for AAA and aortic dissection at this time.  Patient tolerated p.o. challenge today without vomiting.  Patient has remained hemodynamically stable in the ED today.    Disposition: Discharge home  Patient was advised to follow-up with orthopedic surgery spine provider for further evaluation and management of her back pain.  Referral and contact information for orthopedic surgery clinic has been included in discharge paperwork.   Patient was given prescription for Tylenol and lidocaine patches to use for back pain relief.  Patient was advised to take the medications as prescribed.  Strict return precautions were given.  Plan were discussed with the patient and the patient understands and agrees.  Patient was discharged in stable condition.    Patient was discussed and staffed with Dr. Chris Carroll    Procedure  Procedures     Olvin Payan PA-C  10/21/24 9715

## 2024-10-21 NOTE — DISCHARGE INSTRUCTIONS
Informed patient the need for urine sample. Patient verbalized understanding. Patient unable to provide sample at this time. Nurse aware. Will continue to encourage.     Ambika Mckoy  09/22/19 5180     Please follow-up with orthopedic surgery spine provider for further evaluation and management of your symptoms.  Referral and contact information for the orthopedic surgery clinic has been included in your discharge paperwork.  Prescriptions for Tylenol and lidocaine patches have been sent to your pharmacy for pain relief.  Please take medications as prescribed.  Return to the emergency department if back pain worsens, you are unable to walk, you are unable to control your bladder or bowels, you have numbness to your legs, have persistent fevers, or any new symptoms began.

## 2024-10-21 NOTE — ED TRIAGE NOTES
Pt is a 70yo female who presents to [facility] ED by way of EMS. Reports of pt calling for assistance after being awoke with severe pain in lower back. Pt endorses pain 10/10. Pt denies any chest pains, SOB, N/V/D. Pt was here for a prior visit 2 days ago for knee pain, was confused on DC instructions, thought urine lab came back Grey with no resolve. Pt was re-educated on the color of the tube used to collect the urine for the lab, pt verbalized understanding.

## 2024-10-22 NOTE — TELEPHONE ENCOUNTER
Attempted to call patient, left message that we can discuss issue on apt 10/24/24. Reviewed UA not showing evidence of UTI.

## 2024-10-24 ENCOUNTER — APPOINTMENT (OUTPATIENT)
Dept: PRIMARY CARE | Facility: CLINIC | Age: 69
End: 2024-10-24
Payer: COMMERCIAL

## 2024-10-24 VITALS
BODY MASS INDEX: 25.9 KG/M2 | OXYGEN SATURATION: 98 % | HEIGHT: 67 IN | SYSTOLIC BLOOD PRESSURE: 108 MMHG | DIASTOLIC BLOOD PRESSURE: 69 MMHG | HEART RATE: 76 BPM | TEMPERATURE: 98 F | WEIGHT: 165 LBS

## 2024-10-24 DIAGNOSIS — M48.061 SPINAL STENOSIS OF LUMBAR REGION WITHOUT NEUROGENIC CLAUDICATION: ICD-10-CM

## 2024-10-24 DIAGNOSIS — Z23 ENCOUNTER FOR IMMUNIZATION: Primary | ICD-10-CM

## 2024-10-24 DIAGNOSIS — L84 CALLUS OF FOOT: ICD-10-CM

## 2024-10-24 DIAGNOSIS — G89.29 CHRONIC PAIN OF LEFT KNEE: ICD-10-CM

## 2024-10-24 DIAGNOSIS — E11.3291 TYPE 2 DIABETES MELLITUS WITH RIGHT EYE AFFECTED BY MILD NONPROLIFERATIVE RETINOPATHY WITHOUT MACULAR EDEMA, WITHOUT LONG-TERM CURRENT USE OF INSULIN: ICD-10-CM

## 2024-10-24 DIAGNOSIS — K63.5 POLYP OF COLON, UNSPECIFIED PART OF COLON, UNSPECIFIED TYPE: ICD-10-CM

## 2024-10-24 DIAGNOSIS — M25.562 CHRONIC PAIN OF LEFT KNEE: ICD-10-CM

## 2024-10-24 DIAGNOSIS — E55.9 VITAMIN D DEFICIENCY: ICD-10-CM

## 2024-10-24 LAB
CREAT UR-MCNC: 55.5 MG/DL (ref 20–320)
MICROALBUMIN UR-MCNC: 11.2 MG/L
MICROALBUMIN/CREAT UR: 20.2 UG/MG CREAT

## 2024-10-24 RX ORDER — OMEPRAZOLE 20 MG/1
20 CAPSULE, DELAYED RELEASE ORAL
COMMUNITY
Start: 2023-12-05 | End: 2024-10-24 | Stop reason: WASHOUT

## 2024-10-24 RX ORDER — ACETAMINOPHEN 325 MG/1
650 TABLET ORAL EVERY 8 HOURS
Qty: 500 TABLET | Refills: 3 | Status: SHIPPED | OUTPATIENT
Start: 2024-10-24

## 2024-10-24 RX ORDER — LORATADINE 10 MG/1
TABLET ORAL
COMMUNITY
Start: 2024-10-14 | End: 2024-10-24 | Stop reason: WASHOUT

## 2024-10-24 RX ORDER — ALCOHOL ANTISEPTIC PADS
PADS, MEDICATED (EA) TOPICAL
COMMUNITY
Start: 2024-08-21

## 2024-10-24 RX ORDER — RESVER/WINE/BFL/GRPSD/PC/C/POM 200MG-60MG
5000 CAPSULE ORAL DAILY
Qty: 90 TABLET | Refills: 3 | Status: SHIPPED | OUTPATIENT
Start: 2024-10-24

## 2024-10-24 RX ORDER — RESVER/WINE/BFL/GRPSD/PC/C/POM 200MG-60MG
CAPSULE ORAL
COMMUNITY
Start: 2024-03-20 | End: 2024-10-24 | Stop reason: SDUPTHER

## 2024-10-24 RX ORDER — METFORMIN HYDROCHLORIDE 500 MG/1
500 TABLET ORAL
Qty: 90 TABLET | Refills: 3 | Status: SHIPPED | OUTPATIENT
Start: 2024-10-24

## 2024-10-24 RX ORDER — POLYETHYLENE GLYCOL 3350 17 G/17G
POWDER, FOR SOLUTION ORAL
COMMUNITY
Start: 2024-08-30

## 2024-10-24 RX ORDER — NITROFURANTOIN 25; 75 MG/1; MG/1
CAPSULE ORAL
COMMUNITY
Start: 2024-04-09 | End: 2024-10-24 | Stop reason: WASHOUT

## 2024-10-24 RX ORDER — AMMONIUM LACTATE 12 G/100G
CREAM TOPICAL
COMMUNITY
Start: 2024-04-22 | End: 2024-10-24 | Stop reason: WASHOUT

## 2024-10-24 RX ORDER — DICLOFENAC SODIUM 10 MG/G
4 GEL TOPICAL 4 TIMES DAILY
Qty: 100 G | Refills: 1 | Status: SHIPPED | OUTPATIENT
Start: 2024-10-24

## 2024-10-24 RX ORDER — ASCORBIC ACID 1000 MG
TABLET ORAL
COMMUNITY
Start: 2024-02-26

## 2024-10-24 ASSESSMENT — PAIN SCALES - GENERAL: PAINLEVEL_OUTOF10: 8

## 2024-10-24 NOTE — ASSESSMENT & PLAN NOTE
20 :: diabetic foot exam otherwise normal  :: A1c well-controlled 6/14/24: 6.2   - educated pm appropriate footwear and advise against shoes that are too small, tight, or rub against a particular area of the foot.  Suggested yearly replacement of shoes--more frequently if they exhibit high wear.  Recommended smoking cessation. Recommended appropriate glycemic control  - added-on urine albumin and A1C to recent labs

## 2024-10-24 NOTE — PATIENT INSTRUCTIONS
Thank you for coming in today!   This is a summarized list of things we discussed today and next steps:  - Please get shingles second dose at pharmacy and covid vaccine at pharmacy  - Please call to schedule an appointment for Mammogram [phone]  - Please call to schedule an appointment with Genetics for colon polyps [phone]   - Please call to schedule an appointment for Physical therapy for back pain [phone]  - Please call to schedule an appointment for imaging ordered today: CT scan of lungs [phone]  - Please call and schedule an appointment with podiatry [phone]  - Please start taking Tylenol 650 mg every 8 hours for back pain  - Please also use diclofenac gel and lidocaine patches for back pain  - Please start taking Vitamin D supplements daily  - Please read any attached handouts and medication list  - Please schedule a follow-up apt with me in 2-3 months

## 2024-10-24 NOTE — PROGRESS NOTES
Subjective   Patient ID: Leni Berman is a 69 y.o. female with  who presents for Follow-up (Pt is here for a follow up and possible UTI).    HPI    Reviewed Interim hx (last seen 2024 by me):  -  for screening colonoscopy will need repeat in 1 year (2025) with extensive 2 bowel preps. GI had also referred to Genetics and patient had appointment but forgot so never went  - Urology  for OAB behavioral therapy and initiating a medication anticholinergic and Beta 3 agonists. Additionally, discussed the possible need for minimally invasive treatment including  PTNS, Botox or Interstim.   - ED  headache and bilateral hand and foot numbness - dehydration vs systemic process, concern for possible chronic hyperglycemia or uncontrolled hypertension leading to the patient's symptoms.   - ED 10/19 knee pain most consistent with arthritis   - ED 10/21 for back pain and nausea CT lumbar spine shows multilevel degenerative changes with mild to moderate spinal canal stenosis and neuroforaminal narrowing most pronounced at L3-L4 and L4-L5 levels. Discussed with patient the findings of his CT. Patient was given Tylenol and lidocaine patches for pain and Zofran for nausea and referred to orthopedic spine surgery.    Reviewed   - phone call note  needs low dose CT scan in 6 month on 24  - pending mammography      DM-2  - meds: metformin 1000 mg daily   - last A1c: 24: 6.2   - missing doses?: no  - measuring BG at home?: occasionally normal  - smokin pack a day ~ 30-40 years  - diet: improving  - has neuropathy in arms mostly  - has OAB   - vision exam this year completed  - denies polydipsia,  vision changes        Back pain  - new  - seen in ED 10/21 for it  - Has apt with orthopedics   - Pain 8/10  - worse at night  - has not identified worsening factors  - improves w/ Tylenol helps taking & icy hot gel  as needed   - has OAB but no new urinary/ fecal incontinence  - occasional neuropathy but  in arms    Review of Systems  All other systems reviewed were negative    Current Outpatient Medications on File Prior to Visit   Medication Sig Dispense Refill    ClearLax 17 gram packet       Milk of Magnesia 400 mg/5 mL suspension       Pure Comfort Safety Lancets 30 gauge misc       [DISCONTINUED] ammonium lactate (Amlactin) 12 % cream APPLY 1 APPLICATION EVERY DAY BY TOPICAL ROUTE FOR 30 DAYS, FOR DRY SKIN AND CALLUS.      [DISCONTINUED] loratadine (Claritin) 10 mg tablet       [DISCONTINUED] nitrofurantoin, macrocrystal-monohydrate, (Macrobid) 100 mg capsule 1 CAPSULE ORALLY EVERY 12 HOURS FOR 7 DAYS WITH FOOD      [DISCONTINUED] omeprazole (PriLOSEC) 20 mg DR capsule Take 1 capsule (20 mg) by mouth.      [DISCONTINUED] Vitamin D3 125 mcg (5,000 unit) tablet       atorvastatin (Lipitor) 40 mg tablet Take 1 tablet (40 mg) by mouth once daily. 30 tablet 11    enalapril (Vasotec) 10 mg tablet Take 1 tablet (10 mg) by mouth once daily. 30 tablet 11    magnesium oxide (Mag-Ox) 400 mg (241.3 mg magnesium) tablet Take 1 tablet (400 mg) by mouth once daily.      [DISCONTINUED] acetaminophen (Tylenol Extra Strength) 500 mg tablet Take 2 tablets (1,000 mg) by mouth every 6 hours if needed for mild pain (1 - 3) or moderate pain (4 - 6). 180 tablet 2    [DISCONTINUED] acetaminophen (Tylenol) 500 mg tablet Take 1 tablet (500 mg) by mouth every 6 hours if needed for mild pain (1 - 3) for up to 5 days. 20 tablet 0    [DISCONTINUED] cyclobenzaprine (Flexeril) 10 mg tablet Take 1 tablet (10 mg) by mouth every 8 hours if needed for muscle spasms.      [DISCONTINUED] hydrOXYzine HCL (Atarax) 25 mg tablet Take 1 tablet (25 mg) by mouth every 6 hours for 10 doses. 10 tablet 0    [DISCONTINUED] ibuprofen 600 mg tablet Take 1 tablet (600 mg) by mouth every 6 hours if needed for mild pain (1 - 3). 120 tablet 0    [DISCONTINUED] lidocaine (Lidoderm) 5 % patch Place 1 patch over 12 hours on the skin once daily. Remove & discard patch  "within 12 hours or as directed by MD. 5 patch 0    [DISCONTINUED] lidocaine HCL 4 % adhesive patch,medicated Apply 1 patch topically every 24 (twenty four) hours if needed (knee pain). (Patient not taking: Reported on 5/8/2024) 30 patch 0    [DISCONTINUED] lidocaine-menthol 4-1 % adhesive patch,medicated Apply 1 each topically every 12 hours if needed (Left knee pain). 30 patch 1    [DISCONTINUED] metFORMIN (Glucophage) 500 mg tablet Take 1 tablet (500 mg) by mouth 2 times a day with meals.       No current facility-administered medications on file prior to visit.        Objective   Vitals: /69 (BP Location: Right arm, Patient Position: Sitting)   Pulse 76   Temp 36.7 °C (98 °F) (Temporal)   Ht 1.702 m (5' 7\")   Wt 74.8 kg (165 lb)   SpO2 98%   BMI 25.84 kg/m²      Physical Exam  General: well-appearing, NAD  HEENT: NC/AT  Cardiac: rrr, no m/r/g  Lungs: normal work of breathing, CTAB  Abdomen: soft, non-tender, non-distended, BS present  Neuro: grossly intact  MSK: No peripheral edema, cyanosis, or pallor  Psych: no depression, anxiety    Diabetic foot exam:   Left: Reflexes 2+    Filament test present              Full ROM of ankle joint yes,no: Yes              Skin callous  Right: Reflexes 2+    Filament test present              Full ROM of ankle joint yes,no: Yes              Skin dryness    Assessment/Plan     70 yo PMHx of HTN, HLD, T2DM, Colonic polyps, AUD in remission, MDD, OA of hip/back/knee, spinal stenosis, hx of seizures coming for follow-up. Overall HDS and well appearing except for new concern of back pain. Detailed A/P below:    Problem List Items Addressed This Visit             ICD-10-CM    Type 2 diabetes mellitus with right eye affected by mild nonproliferative retinopathy without macular edema, without long-term current use of insulin E11.3291     :: diabetic foot exam otherwise normal  :: A1c well-controlled 6/14/24: 6.2   - educated pm appropriate footwear and advise against " shoes that are too small, tight, or rub against a particular area of the foot.  Suggested yearly replacement of shoes--more frequently if they exhibit high wear.  Recommended smoking cessation. Recommended appropriate glycemic control  - added-on urine albumin and A1C to recent labs         Relevant Medications    metFORMIN (Glucophage) 500 mg tablet    Other Relevant Orders    Hemoglobin A1c    Albumin-Creatinine Ratio, Urine Random    Spinal stenosis of lumbar region without neurogenic claudication M48.061     :: newly diagnosed per CT 10/21  in ED  :: no significant evidence of claudication at this time  - Pain management, educated on multimodal approach  --- Meds: tylenol + diclofenac gel + lidocaine patch  --- physical therapy  --- keep spine ortho apt for evaluation         Relevant Medications    acetaminophen (Tylenol) 325 mg tablet    diclofenac sodium (Voltaren) 1 % gel    Other Relevant Orders    Referral to Physical Therapy    Polyp of colon K63.5     - referred to genetics  - had previously ordered colono for 5/8/2025, will follow         Relevant Orders    Referral to Genetics    Callus of foot L84     :: new, 2 calluses on L sole  - referral to podiatry for callus management         Relevant Orders    Referral to Podiatry    Vitamin D deficiency E55.9    Relevant Medications    Vitamin D3 125 mcg (5,000 unit) tablet    Chronic pain of left knee M25.562, G89.29    Relevant Medications    lidocaine HCL 4 % adhesive patch,medicated     Other Visit Diagnoses         Codes    Encounter for immunization    -  Primary Z23    Relevant Orders    RSV, Recombinant, 60 years and older (AREXVY) (Completed)    Flu vaccine, trivalent, preservative free, HIGH-DOSE, age 65y+ (Fluzone) (Completed)            Attending Supervision: seen and discussed with attending physician (jeni listed on this note).    RTC in 2-3 mo for medicare wellness, or earlier as needed.    Patient seen and discussed with attending  physician Dr. Ferraro    Plan preliminary until cosigned by attending physician.    Anabell Amanda M.D.  Family Medicine  PGY-2

## 2024-10-24 NOTE — ASSESSMENT & PLAN NOTE
:: newly diagnosed per CT 10/21  in ED  :: no significant evidence of claudication at this time  - Pain management, educated on multimodal approach  --- Meds: tylenol + diclofenac gel + lidocaine patch  --- physical therapy  --- keep spine ortho apt for evaluation

## 2024-10-24 NOTE — PROGRESS NOTES
I saw and evaluated the patient. I personally obtained the key and critical portions of the history and physical exam or was physically present for key and critical portions performed by the resident/fellow. I reviewed the resident/fellow's documentation and discussed the patient with the resident/fellow. I agree with the resident/fellow's medical decision making as documented in the note.    Notes reviewed:  She had 5 adenomas biopsied/ excised at colonoscopy.  I could not tell whether there were more that were not biopsied.  Both sessile and serrated adenomas, small.    Repeat colonoscopy in 1 year and referred for genetics evaluation.       Pt. Now agrees to PT as well as orthopedic spine referral.   Bhavna Ferraro MD

## 2024-10-25 LAB
EST. AVERAGE GLUCOSE BLD GHB EST-MCNC: 128 MG/DL
HBA1C MFR BLD: 6.1 %
HOLD SPECIMEN: NORMAL

## 2024-10-28 NOTE — DISCHARGE INSTRUCTIONS
Do not drive for 24 hours.  Do not sign any legal documents for 24 hours.  Continue medications as prescribed   You may not have a bowel movement for 1-3 days, this is normal.  You may expel large amounts of gas.  Please go to the nearest ER if any of the following occur:       - Chest pain       - Fever greater that 101 degrees       - Large amounts of blood in stool or stools present as black and tarry   Diet- nothing heavy, avoid greasy or spicy foods for 24 hours.  Please have someone available to stay with you for 24 hours.     The body was positioned using the following devices: safety strap, gel pad mattress and blanket.

## 2024-11-04 ENCOUNTER — OFFICE VISIT (OUTPATIENT)
Dept: ORTHOPEDIC SURGERY | Facility: CLINIC | Age: 69
End: 2024-11-04
Payer: COMMERCIAL

## 2024-11-04 VITALS — WEIGHT: 165 LBS | BODY MASS INDEX: 25.9 KG/M2 | HEIGHT: 67 IN

## 2024-11-04 DIAGNOSIS — M47.816 LUMBAR SPONDYLOSIS: Primary | ICD-10-CM

## 2024-11-04 DIAGNOSIS — M48.062 LUMBAR STENOSIS WITH NEUROGENIC CLAUDICATION: ICD-10-CM

## 2024-11-04 PROCEDURE — 99213 OFFICE O/P EST LOW 20 MIN: CPT | Performed by: PHYSICIAN ASSISTANT

## 2024-11-04 ASSESSMENT — PAIN - FUNCTIONAL ASSESSMENT: PAIN_FUNCTIONAL_ASSESSMENT: 0-10

## 2024-11-05 NOTE — PROGRESS NOTES
"HPI:  Leni Berman is a 69 y.o. female who presents to the spine clinic for evaluation of chronic low back pain.     No injury or inciting event. Pain in the small of the back and increased in the mornings when out of bed. Pain improves after \"moving around throughout the day\".   No radicular leg pain. Reports intermittent leg numbness/tingling and sensation of \"heaviness\" with ambulation. No bowel or bladder dysfunction.   Recently seen in ED on 10/21/24 for exacerbation of pain - CT was performed and she was referred here for further management.     Using Tylenol, lidocaine patches, and Voltaren gel for pain.   No recent PT or injections.     ROS:  Reviewed on EMR and patient intake sheet.    PMH/SH:  Reviewed on EMR and patient intake sheet.    Exam:  Physical Exam  Spine Musculoskeletal Exam    Well appearing, NAD  Pleasant & cooperative  BMI 25.84  Decreased ROM lumbar spine with rotation, flexion/extension  no paraspinal muscle spasms; NTTP  lower extremity sensation intact to light touch  lower extremity motor 5/5 throughout  normal gait & station    Radiology:     CT lumbar spine dated 10/21/24 personally reviewed along with the accompanying rad report. No scoliosis, fractures, or spondylolisthesis. Disc spaces well maintained. Mild degenerative changes throughout the lumbar spine.     Assessment and Plan:  1. Lumbar stenosis with neurogenic claudication  2. Lumbar spondylosis (Primary)  - Referral to Physical Therapy; Future    I reviewed the imaging studies with Leni Berman in detail today.    Patient was seen today for discussion and evaluation of lumbar stenosis and neurogenic claudication symptoms. I educated the patient on several lifestyle modifications that include increased walking, weight management, non-smoking, and a routine exercise plan that includes core strengthening.     Referral placed for outpatient PT to focus on core strengthening.     The patient was recommended to follow up in 4-6 " weeks if their symptoms do not improve or worsen with the completion of PT. The next step would be an MRI to further investigate the status of stenosis in lumbar spine and followup in office to review next steps. She may benefit from LILIANA in the future.     Patient in agreement to plan of care. Of course Leni Berman is welcome to call at anytime with questions or concerns.     Marie Beal PA-C  Department of Orthopaedic Surgery    [address] [place] [address]    Voicemail: [phone]   Appts: [phone]  Fax: [phone]

## 2024-11-14 ENCOUNTER — OFFICE VISIT (OUTPATIENT)
Dept: UROLOGY | Facility: CLINIC | Age: 69
End: 2024-11-14
Payer: COMMERCIAL

## 2024-11-14 VITALS
WEIGHT: 167.8 LBS | SYSTOLIC BLOOD PRESSURE: 112 MMHG | HEART RATE: 118 BPM | DIASTOLIC BLOOD PRESSURE: 62 MMHG | TEMPERATURE: 97.8 F | OXYGEN SATURATION: 99 % | BODY MASS INDEX: 26.34 KG/M2 | RESPIRATION RATE: 16 BRPM | HEIGHT: 67 IN

## 2024-11-14 DIAGNOSIS — N39.0 RECURRENT UTI: Primary | ICD-10-CM

## 2024-11-14 LAB
POC APPEARANCE, URINE: CLEAR
POC BILIRUBIN, URINE: NEGATIVE
POC BLOOD, URINE: NEGATIVE
POC COLOR, URINE: YELLOW
POC GLUCOSE, URINE: NEGATIVE MG/DL
POC KETONES, URINE: NEGATIVE MG/DL
POC LEUKOCYTES, URINE: NEGATIVE
POC NITRITE,URINE: NEGATIVE
POC PH, URINE: 6.5 PH
POC PROTEIN, URINE: NEGATIVE MG/DL
POC SPECIFIC GRAVITY, URINE: 1.02
POC UROBILINOGEN, URINE: 1 EU/DL

## 2024-11-14 PROCEDURE — 1125F AMNT PAIN NOTED PAIN PRSNT: CPT | Performed by: PHYSICIAN ASSISTANT

## 2024-11-14 PROCEDURE — 3061F NEG MICROALBUMINURIA REV: CPT | Performed by: PHYSICIAN ASSISTANT

## 2024-11-14 PROCEDURE — 3044F HG A1C LEVEL LT 7.0%: CPT | Performed by: PHYSICIAN ASSISTANT

## 2024-11-14 PROCEDURE — 4010F ACE/ARB THERAPY RXD/TAKEN: CPT | Performed by: PHYSICIAN ASSISTANT

## 2024-11-14 PROCEDURE — 51798 US URINE CAPACITY MEASURE: CPT | Performed by: PHYSICIAN ASSISTANT

## 2024-11-14 PROCEDURE — 99213 OFFICE O/P EST LOW 20 MIN: CPT | Performed by: PHYSICIAN ASSISTANT

## 2024-11-14 PROCEDURE — 3074F SYST BP LT 130 MM HG: CPT | Performed by: PHYSICIAN ASSISTANT

## 2024-11-14 PROCEDURE — 3048F LDL-C <100 MG/DL: CPT | Performed by: PHYSICIAN ASSISTANT

## 2024-11-14 PROCEDURE — 3078F DIAST BP <80 MM HG: CPT | Performed by: PHYSICIAN ASSISTANT

## 2024-11-14 PROCEDURE — 81003 URINALYSIS AUTO W/O SCOPE: CPT | Performed by: PHYSICIAN ASSISTANT

## 2024-11-14 PROCEDURE — 3008F BODY MASS INDEX DOCD: CPT | Performed by: PHYSICIAN ASSISTANT

## 2024-11-14 SDOH — ECONOMIC STABILITY: FOOD INSECURITY: WITHIN THE PAST 12 MONTHS, YOU WORRIED THAT YOUR FOOD WOULD RUN OUT BEFORE YOU GOT MONEY TO BUY MORE.: NEVER TRUE

## 2024-11-14 SDOH — ECONOMIC STABILITY: FOOD INSECURITY: WITHIN THE PAST 12 MONTHS, THE FOOD YOU BOUGHT JUST DIDN'T LAST AND YOU DIDN'T HAVE MONEY TO GET MORE.: NEVER TRUE

## 2024-11-14 ASSESSMENT — ENCOUNTER SYMPTOMS
CARDIOVASCULAR NEGATIVE: 1
PSYCHIATRIC NEGATIVE: 1
EYES NEGATIVE: 1
ALLERGIC/IMMUNOLOGIC NEGATIVE: 1
ENDOCRINE NEGATIVE: 1
NEUROLOGICAL NEGATIVE: 1
HEMATOLOGIC/LYMPHATIC NEGATIVE: 1
CONSTITUTIONAL NEGATIVE: 1
LOSS OF SENSATION IN FEET: 0
MUSCULOSKELETAL NEGATIVE: 1
OCCASIONAL FEELINGS OF UNSTEADINESS: 0
GASTROINTESTINAL NEGATIVE: 1
RESPIRATORY NEGATIVE: 1
DEPRESSION: 0

## 2024-11-14 ASSESSMENT — PATIENT HEALTH QUESTIONNAIRE - PHQ9
SUM OF ALL RESPONSES TO PHQ9 QUESTIONS 1 AND 2: 0
2. FEELING DOWN, DEPRESSED OR HOPELESS: NOT AT ALL
1. LITTLE INTEREST OR PLEASURE IN DOING THINGS: NOT AT ALL

## 2024-11-14 ASSESSMENT — LIFESTYLE VARIABLES: HOW OFTEN DO YOU HAVE SIX OR MORE DRINKS ON ONE OCCASION: LESS THAN MONTHLY

## 2024-11-14 ASSESSMENT — PAIN SCALES - GENERAL: PAINLEVEL_OUTOF10: 5

## 2024-11-14 NOTE — PROGRESS NOTES
Subjective   Patient ID: Lein Berman is a 69 y.o. female who presents for Follow-up.  HPI  Patient is a 68 yo female with DM type 2, HTN, questionable recurrent UTIs, OAB presents for follow up,    Patient reports urinary frequency and urgency about every hours day and night. She reports reports intermittent urge incontinence when she waits too long. She denies hematuria or dysuria evaluation.    Patient reports intermittent low back pain she was recently diagnosed with spinal stenosis    UA negative   PVR 37    Review of Systems   Constitutional: Negative.    HENT: Negative.     Eyes: Negative.    Respiratory: Negative.     Cardiovascular: Negative.    Gastrointestinal: Negative.    Endocrine: Negative.    Genitourinary: Negative.    Musculoskeletal: Negative.    Skin: Negative.    Allergic/Immunologic: Negative.    Neurological: Negative.    Hematological: Negative.    Psychiatric/Behavioral: Negative.         Objective   Physical Exam  Constitutional:       General: She is not in acute distress.     Appearance: Normal appearance.   HENT:      Head: Normocephalic and atraumatic.      Nose: Nose normal.      Mouth/Throat:      Mouth: Mucous membranes are dry.   Cardiovascular:      Rate and Rhythm: Normal rate.   Pulmonary:      Effort: Pulmonary effort is normal.   Abdominal:      General: Abdomen is flat.      Palpations: Abdomen is soft.   Musculoskeletal:         General: Normal range of motion.      Cervical back: Normal range of motion and neck supple.   Skin:     General: Skin is warm and dry.   Neurological:      General: No focal deficit present.      Mental Status: She is alert and oriented to person, place, and time.   Psychiatric:         Mood and Affect: Mood normal.       Assessment/Plan     OAB    I discussed urine symptoms are most likely related to OAB.   Today we discussed medication therapy to help alleviate symptoms secondary to OAB. We discussed in detail that first line treatment for OAB is  behavioral therapy and initiating a medication does not replace behavioral therapy, but should work in conjunction with one another. The mechanism of action as well as the risks, benefits, common side effects, and alternatives to all prescribed medications were discussed with the patient at length. The patient had the opportunity to ask questions and all questions were answered. I primarily discussed the use of anticholinergic and Beta 3 agonists. Additionally, we discussed the possible need for minimally invasive treatment including  PTNS, Botox or Interstim.     Patient would like to be observed since her symptoms are not bothersome.     Follow up in 6 months or sooner as needed.       Tim Crystal PA-C 11/14/24 1:17 PM

## 2024-11-22 ENCOUNTER — HOSPITAL ENCOUNTER (INPATIENT)
Dept: RADIOLOGY | Facility: HOSPITAL | Age: 69
Discharge: HOME | End: 2024-11-22
Payer: COMMERCIAL

## 2024-11-22 DIAGNOSIS — R91.8 GROUND GLASS OPACITY PRESENT ON IMAGING OF LUNG: ICD-10-CM

## 2024-11-22 DIAGNOSIS — Z13.820 OSTEOPOROSIS SCREENING: ICD-10-CM

## 2024-11-22 DIAGNOSIS — Z13.9 SCREENING DUE: ICD-10-CM

## 2024-11-22 PROCEDURE — 71250 CT THORAX DX C-: CPT

## 2024-11-22 PROCEDURE — 77080 DXA BONE DENSITY AXIAL: CPT

## 2024-11-22 PROCEDURE — 71250 CT THORAX DX C-: CPT | Performed by: RADIOLOGY

## 2024-11-25 ENCOUNTER — TELEPHONE (OUTPATIENT)
Dept: PRIMARY CARE | Facility: CLINIC | Age: 69
End: 2024-11-25
Payer: COMMERCIAL

## 2024-11-25 DIAGNOSIS — Z87.891 PERSONAL HISTORY OF NICOTINE DEPENDENCE: ICD-10-CM

## 2024-11-25 DIAGNOSIS — R91.8 GROUND GLASS OPACITY PRESENT ON IMAGING OF LUNG: Primary | ICD-10-CM

## 2024-12-03 ENCOUNTER — APPOINTMENT (OUTPATIENT)
Dept: GENETICS | Facility: CLINIC | Age: 69
End: 2024-12-03
Payer: COMMERCIAL

## 2024-12-06 ENCOUNTER — EVALUATION (OUTPATIENT)
Dept: PHYSICAL THERAPY | Facility: HOSPITAL | Age: 69
End: 2024-12-06
Payer: COMMERCIAL

## 2024-12-06 DIAGNOSIS — M48.061 SPINAL STENOSIS OF LUMBAR REGION WITHOUT NEUROGENIC CLAUDICATION: ICD-10-CM

## 2024-12-06 PROCEDURE — 97161 PT EVAL LOW COMPLEX 20 MIN: CPT | Mod: GP | Performed by: PHYSICAL THERAPIST

## 2024-12-06 PROCEDURE — 97110 THERAPEUTIC EXERCISES: CPT | Mod: GP | Performed by: PHYSICAL THERAPIST

## 2024-12-06 ASSESSMENT — ENCOUNTER SYMPTOMS
LOSS OF SENSATION IN FEET: 1
DEPRESSION: 0
OCCASIONAL FEELINGS OF UNSTEADINESS: 1

## 2024-12-06 NOTE — PROGRESS NOTES
Initial evaluation  Physical Therapy Initial Evaluation    Patient Name:Lnei Berman  MRN:[id number]  Today's Date:12/6/2024  Referred by: Bhavna Ferraro  Time Calculation  Start Time: 1035  Stop Time: 1115  Time Calculation (min): 40 min    Therapy Diagnosis  1. Spinal stenosis of lumbar region without neurogenic claudication       Plan of Care  Planned interventions include PRN: therapeutic exercise, manual therapy, electrical stimulation, hot pack, HEP training.   Frequency and duration: 1 time(s) a week, for  8 weeks or 8 visits .   Plan of care was developed with input and agreement by the patient.   Plan for next session: review HEP, manual therapy to paraspinal mm. And begin core stabilization    Assessment  Problem List: Pain, range of motion/joint mobility, strength, activity limitations, ADLs/IADLs/self care skills, decreased functional level,  decreased knowledge of HEP, flexibility, and posture.     Patient is a 69 y.o. female who presents with complaint of chronic low back pain. Standardized testing and measures administered today reveal that the patient has multiple impairments in body structures and functions, activity limitations, and participation restrictions. These include subjective and objective findings such as pain, tenderness to palpation of the affected area, decreased ROM, strength, flexibility, and function. The patient's impairments are likely influenced by mechanical dysfunction and deconditioning with possible overuse and degenerative changes. Skilled PT services are warranted in order to realize measurable and meaningful change in the above outcome measures and achieve improvements in the patient's functional status and individual goals.    Rehab Potential:fair  Clinical Presentation: Stable and/or uncomplicated characteristics.   Evaluation Complexity: Low      Precautions/Fall Risk:none* Pacemaker no  Seizures No  Post Op Movement/Restrictions No    Insurance  Visit number: 1    Approved number of visits: MN  Onset Date: 2024  Certification Period:  Beginnin/6/2024            Ending: 3/06/2025  Payor: [employer] DUAL COMPLETE / Plan: [employer] DUAL COMPLETE / Product Type: *No Product type* /     Subjective  Chief complaint/reason for visit: Patient is a 69 year old female with chronic low back pain. Denies injury. Denies falls. Pain is intermittent into R/L legs.   Mechanism of Injury:  a chronic condition  Location of Pain: low back pain   Current Pain Level (0-10): 6   High Pain Level (0-10): 9   Low Pain Level (0-10): 0  Pain Quality: aching and N&T  Pain Exacerbating Factors: lying down, sitting, standing, walking, lifting  Pain Relieving Factors: medications Tylenol and IcyHot , Epsom Salt soaks     Medical Screening: Reviewed medical history form with patient and medical screening assessed.   Red Flags: Do you have any of the following? No  Fever/chills, unexplained weight changes, dizziness/fainting, unexplained change in bowel or bladder functions, unexplained malaise or muscle weakness, night pain/sweats, numbness or tingling  Current Medical Management: Ortho, ED, Imaging   Prior Level of Function (PLOF)  Patient previously independent with all ADLs  Exercise/Physical Activity: walking  Functional limitations: decreased positional tolerances to standing, sitting, walking, bending, driving, stairs, self-care activities, work related tasks, lifting, participation in home management/duties, participation in leisure activities and athletics.  Work Status: unemployed and retired  Current Status: remain unchanged  Patient Awareness: Patient is aware of  her diagnosis and prognosis.   Living Environment: apartment-has elevator  Social Support: lives alone  Personal Factors That May Impact Care: see PMH  Patient's Goal for Treatment: relieving pain, increasing strength, increasing mobility, improving positional tolerances,, reducing symptoms, and resuming  household activities .     Objective  Other Measures  Oswestry Disablity Index (ANATOLY): 40%     Observation/Posture: mildly rounded shoulder posture  Gait: slow antalgic, sometimes uses a cane  Palpation: TTP Thoracolumbar paraspinals R>L    Lumbar AROM:   Lumbar Flexion (%):  75% with stiffness  Lumbar Extension (%): 50% with relief  Lumbar Sidebend R/L (%): 25% with stiffness  Lower Quarter Screen:   MMT: 4/5 hip flexion with knee pain, remaining LE MMT 4+/5 with mild knee pain   Sensation: intact and equal bilaterally   Special Testing  SLR: negative, mild HS tightness  PAULINE: (+) LBP on R  Begum's: Negative  SI Tests:Negative    Treatment Performed Today: Initial evaluation and patient education regarding diagnosis, prognosis, contributing factors, comorbidities, importance and instruction of HEP, role of PT, postural re-education, activity modification, appropriate shoe wear, and body mechanics.    Therapeutic Exercise 15 minutes  Education/Resources provided today: Home Program   [facility]: Provided, reviewed and performed the following therapeutic exercises with the patient:  Access Code: [email or web address]  URL: [email or web address]  Date: 12/06/2024  Prepared by: Ursula Hernandez PT    Exercises  - Supine Lower Trunk Rotation  - 1 x daily - 7 x weekly - 10 reps - 10 hold  - Supine Posterior Pelvic Tilt  - 1 x daily - 7 x weekly - 10 reps - 5 hold  - Seated Hamstring Stretch  - 1 x daily - 7 x weekly - 3 sets - 30 hold  - Supine Figure 4 Piriformis Stretch  - 1 x daily - 7 x weekly - 3 sets - 30 hold  - Sidelying Thoracic Rotation with Open Book  - 1 x daily - 7 x weekly - 10 reps - 10 hold    Modalities   Electrical Stimulation          minutes    Response to Treatment: improved knowledge and understanding of condition, decreased pain, improved joint mobility/ROM, improved strength, improved flexibility    Goals: Goals set and discussed today.   Lumbar Goals  Lumbar Spine Goals:  By discharge,  patient will:  1) Demonstrate independence with home exercise program  2) Tolerate PLOA without adverse reaction  3) Increase ROM of the lumbar spine to lumbar spine in order to improve the ability to perform essential ADLs  4) Increase strength of bilateral LE to 4+ or > in order to improve the ability to perform essential ADLs  5) Improve positional tolerances of standing, sitting, walking >10 minutes/hours for essential ADLs and work duties.   6) Report decrease in pain by >= 2 points to meet MCID  7) Decrease score of Modified ANATOLY by > 6 points to meet the MCID  8) Ascend and descend 1 flight of stairs without an increase in symptoms  9) Ambulate x community distances without an increase in symptoms  10) Be able to sleep through the night without an increase in symptoms  11) Be able to perform the ADL of lifting objects up to 10 lbs for household duties without an increase or production of symptoms    Patient stated goal: stop pain

## 2024-12-12 ENCOUNTER — TELEPHONE (OUTPATIENT)
Dept: PRIMARY CARE | Facility: CLINIC | Age: 69
End: 2024-12-12
Payer: COMMERCIAL

## 2024-12-12 DIAGNOSIS — M85.80 OSTEOPENIA, UNSPECIFIED LOCATION: Primary | ICD-10-CM

## 2024-12-12 RX ORDER — PSYLLIUM HUSK 0.4 G
1 CAPSULE ORAL DAILY
Qty: 90 TABLET | Refills: 3 | Status: SHIPPED | OUTPATIENT
Start: 2024-12-12 | End: 2025-03-12

## 2024-12-12 NOTE — TELEPHONE ENCOUNTER
Left message for dtr and will comment on mychart/send letter about osteopenia, Starting calcium/Vit D supplementation (discontinued prev vit d pill dosage 86070 international unit daily as combination pill suffices

## 2024-12-12 NOTE — LETTER
"December 12, 2024     Leni Berman  [address]  [facility] [address]      Dear Leni:    Below are the results from your recent visit:    Resulted Orders   XR DEXA bone density    Narrative    Interpreted By:  Joanna Conley,   STUDY:  DEXA BONE [id number]/22/2024 10:20 am      INDICATION:  Signs/Symptoms:screening. The patient is a 68 y/o  year old F.      COMPARISON:  None.      ACCESSION NUMBER(S):  [id number]      ORDERING CLINICIAN:  AMERICO MAYER      TECHNIQUE:  DEXA BONE DENSITY      FINDINGS:  LEFT FEMUR -TOTAL  Bone Mineral Density:0.981 g/cm2.  T-Score -0.2  Z-Score 0.3  % change vs Previous : na. % change vs Baseline baseline.      LEFT FEMUR -NECK  Bone Mineral Density:  0.793 g/cm2.  T-Score -1.8   Z-Score-1.0  % change vs Previous : na. % change vs Baseline baseline.      SPINE L1-L4  Bone Mineral Density:1.284 g/cm2.  T-Score 0.9  Z-Score 1.8  % change vs Previous : na. % change vs Baseline baseline.      World Health Organization (WHO) criteria for post-menopausal,   Women:  Normal:         T-score at or above -1 SD  Osteopenia:   T-score between -1 and -2.5 SD  Osteoporosis: T-score at or below -2.5 SD              10-year Fracture Risk:  Major Osteoporotic Fracture  Not reported %  Hip Fracture                        Not reported %            Impression    According to World Health Organization criteria, classification is  osteopenia.      Follow-up exam is recommended in two years or sooner as clinically  warranted.      All images and detailed analysis are available on the  Radiology  PACS.      MACRO:  None      Signed by: Joanna Conley 12/4/2024 2:41 PM  Dictation workstation:   [id number]       The test results show abnormal values which need to be addressed.  In particular, the results for low bone mass also known as \"osteopenia\" and I recommend starting combination of calcium-vitamin D supplement which I sent to your pharmacy    If you have any questions or " concerns, please don't hesitate to call [phone].    Sincerely,        Anabell Amanda MD

## 2025-01-03 ENCOUNTER — TREATMENT (OUTPATIENT)
Dept: PHYSICAL THERAPY | Facility: HOSPITAL | Age: 70
End: 2025-01-03
Payer: COMMERCIAL

## 2025-01-03 DIAGNOSIS — M48.061 SPINAL STENOSIS OF LUMBAR REGION WITHOUT NEUROGENIC CLAUDICATION: ICD-10-CM

## 2025-01-03 PROCEDURE — 97140 MANUAL THERAPY 1/> REGIONS: CPT | Mod: GP | Performed by: PHYSICAL THERAPIST

## 2025-01-03 PROCEDURE — 97110 THERAPEUTIC EXERCISES: CPT | Mod: GP | Performed by: PHYSICAL THERAPIST

## 2025-01-03 NOTE — PROGRESS NOTES
"Treatment note  Physical Therapy Treatment  Patient Name:Leni Berman  MRN:[id number]  Today's Date:1/3/2025  Referred by: Bhavna Ferraro  Time Calculation  Start Time: 945  Stop Time: 1040  Time Calculation (min): 55 min    Therapy Diagnosis  1. Spinal stenosis of lumbar region without neurogenic claudication         Precautions/Fall Risk:none* Pacemaker no  Seizures No  Post Op Movement/Restrictions No    Insurance  Visit number: 2  Approved number of visits: MN  Onset Date: 2024  Certification Period:  Beginnin/6/2024            Ending: 3/06/2025  Payor: [employer] COMPLETE / Plan: [employer] DUAL COMPLETE / Product Type: *No Product type* /     Subjective/Pain: Patient reports this cold weather has been bothering her back. She reports pain that keeps her up and wakes her up at night. She reports compliance with HEP and states she finds the exercises challenging.   Current Pain Level (0-10): 6    Objective/Outcome Measures:  TrP/TTP glute med/lumbar paraspinals bilaterally    Treatment  Therapeutic Exercise 43 minutes  Recumbent Stepper 6'   LTR: 10\" x 3' R/L  Side lying Trunk Rotation: 10\" x 10   Supine Hip Bridge: 3\" x 10   Supine hip bridge + ADD ball squeeze: 3 x 10   SKTC: 10\" x 10 R/L  Seated Forward Flexion with PB: 5\" x 3'   Seated Piriformis s: 1' R/L       Manual Therapy 12 minutes  STM, Segmental mobility T/L spine, Lumbosacral distraction, glute TrP release    Neuromuscular Re-ed   minutes    Gait Training   minutes    Modalities   Vasopneumatic Device       minutes  Electrical Stimulation          minutes  Ultrasound            minutes  Iontophoresis                     minutes  Cold Pack            minutes  Mechanical Traction           minutes    Assessment: Patient requires a moderate degree of verbal and tactile cuing for instruction of exercises to perform correctly this date. She able to tolerate provided exercises (HEP and newly initiated) without signs of " increased symptomatic complaints or fatigue. STM and lumbosacral distraction performed to patient in prone with goal to alleviate muscle tension in lumbar paraspinals and glutes today. Reported improved in overall symptomatic complaints post treatment session.     Plan: Progress with core stabilization and hip/spinal mobility as tolerated by patient    Goals:  Lumbar Goals  Lumbar Spine Goals:  By discharge, patient will:  1) Demonstrate independence with home exercise program  2) Tolerate PLOA without adverse reaction  3) Increase ROM of the lumbar spine to lumbar spine in order to improve the ability to perform essential ADLs  4) Increase strength of bilateral LE to 4+ or > in order to improve the ability to perform essential ADLs  5) Improve positional tolerances of standing, sitting, walking >10 minutes/hours for essential ADLs and work duties.   6) Report decrease in pain by >= 2 points to meet MCID  7) Decrease score of Modified ANATOLY by > 6 points to meet the MCID  8) Ascend and descend 1 flight of stairs without an increase in symptoms  9) Ambulate x community distances without an increase in symptoms  10) Be able to sleep through the night without an increase in symptoms  11) Be able to perform the ADL of lifting objects up to 10 lbs for household duties without an increase or production of symptoms    Patient stated goal: stop pain

## 2025-01-08 ENCOUNTER — APPOINTMENT (OUTPATIENT)
Dept: NEUROLOGY | Facility: HOSPITAL | Age: 70
End: 2025-01-08
Payer: COMMERCIAL

## 2025-01-10 ENCOUNTER — TREATMENT (OUTPATIENT)
Dept: PHYSICAL THERAPY | Facility: HOSPITAL | Age: 70
End: 2025-01-10
Payer: COMMERCIAL

## 2025-01-10 DIAGNOSIS — M48.061 SPINAL STENOSIS OF LUMBAR REGION WITHOUT NEUROGENIC CLAUDICATION: ICD-10-CM

## 2025-01-10 PROCEDURE — 97140 MANUAL THERAPY 1/> REGIONS: CPT | Mod: GP | Performed by: PHYSICAL THERAPIST

## 2025-01-10 PROCEDURE — 97110 THERAPEUTIC EXERCISES: CPT | Mod: GP | Performed by: PHYSICAL THERAPIST

## 2025-01-10 NOTE — PROGRESS NOTES
"Treatment note  Physical Therapy Treatment  Patient Name:Leni Berman  MRN:[id number]  Today's Date:1/10/2025  Referred by: Bhavna Ferraro  Time Calculation  Start Time: 1007  Stop Time: 1101  Time Calculation (min): 54 min    Therapy Diagnosis  1. Spinal stenosis of lumbar region without neurogenic claudication         Precautions/Fall Risk:none* Pacemaker no  Seizures No  Post Op Movement/Restrictions No    Insurance  Visit number: 3  Approved number of visits: MN  Onset Date: 2024  Certification Period:  Beginnin/6/2024            Ending: 3/06/2025  Payor: [employer] COMPLETE / Plan: [employer] DUAL COMPLETE / Product Type: *No Product type* /     Subjective/Pain: Patient reports overall \"it's calming down\" she states some days she has pain and others it's okay. She reports she has no pain at arrival today.  Current Pain Level (0-10): 0/10    Objective/Outcome Measures:  TrP/TTP glute med/lumbar paraspinals bilaterally    Treatment  Therapeutic Exercise 44 minutes  Recumbent Stepper 8' Lvl 2   LTR: 10\" x 3' R/L  Supine Hip Bridge: 3\" 2 x 10   DKTC: 10\" x 10  Supine hip bridge + ADD ball squeeze: 3 x 10   SKTC: 10\" x 10 R/L  Seated Forward Flexion with PB: 5\" x 3'   Seated Piriformis s: 1' R/L     Manual Therapy 10 minutes  Theragun/STM, Segmental mobility T/L spine, Lumbosacral distraction, glute TrP release    Neuromuscular Re-ed   minutes    Gait Training   minutes    Modalities   Vasopneumatic Device       minutes  Electrical Stimulation          minutes  Ultrasound            minutes  Iontophoresis                     minutes  Cold Pack            minutes  Mechanical Traction           minutes    Assessment: Patient requires a moderate degree of verbal and tactile cuing for instruction of exercises to perform correctly this date. Applied heat to lumbar spine region while performing supine exercises to improve overall muscle relaxation with good response and improved tolerance " to exercises this date. Continued POC with focus on mobility, core stabilization and STM and lumbosacral distraction performed to patient in prone with goal to alleviate muscle tension in lumbar paraspinals and glutes today. Reported improved in overall symptomatic complaints post treatment session.     Plan: Progress with core stabilization and hip/spinal mobility as tolerated by patient    Goals:  Lumbar Goals  Lumbar Spine Goals:  By discharge, patient will:  1) Demonstrate independence with home exercise program  2) Tolerate PLOA without adverse reaction  3) Increase ROM of the lumbar spine to lumbar spine in order to improve the ability to perform essential ADLs  4) Increase strength of bilateral LE to 4+ or > in order to improve the ability to perform essential ADLs  5) Improve positional tolerances of standing, sitting, walking >10 minutes/hours for essential ADLs and work duties.   6) Report decrease in pain by >= 2 points to meet MCID  7) Decrease score of Modified ANATOLY by > 6 points to meet the MCID  8) Ascend and descend 1 flight of stairs without an increase in symptoms  9) Ambulate x community distances without an increase in symptoms  10) Be able to sleep through the night without an increase in symptoms  11) Be able to perform the ADL of lifting objects up to 10 lbs for household duties without an increase or production of symptoms    Patient stated goal: stop pain

## 2025-01-16 ENCOUNTER — APPOINTMENT (OUTPATIENT)
Dept: PRIMARY CARE | Facility: CLINIC | Age: 70
End: 2025-01-16
Payer: COMMERCIAL

## 2025-01-16 VITALS
WEIGHT: 173 LBS | OXYGEN SATURATION: 97 % | SYSTOLIC BLOOD PRESSURE: 101 MMHG | HEIGHT: 67 IN | TEMPERATURE: 96.2 F | DIASTOLIC BLOOD PRESSURE: 65 MMHG | BODY MASS INDEX: 27.15 KG/M2 | HEART RATE: 76 BPM

## 2025-01-16 DIAGNOSIS — I10 BENIGN ESSENTIAL HYPERTENSION: ICD-10-CM

## 2025-01-16 DIAGNOSIS — L84 CALLUS: ICD-10-CM

## 2025-01-16 DIAGNOSIS — K59.00 CONSTIPATION, UNSPECIFIED CONSTIPATION TYPE: Primary | ICD-10-CM

## 2025-01-16 PROCEDURE — 99213 OFFICE O/P EST LOW 20 MIN: CPT | Mod: GE

## 2025-01-16 PROCEDURE — 1159F MED LIST DOCD IN RCRD: CPT

## 2025-01-16 PROCEDURE — G2211 COMPLEX E/M VISIT ADD ON: HCPCS

## 2025-01-16 PROCEDURE — 3074F SYST BP LT 130 MM HG: CPT

## 2025-01-16 PROCEDURE — 3008F BODY MASS INDEX DOCD: CPT

## 2025-01-16 PROCEDURE — 4010F ACE/ARB THERAPY RXD/TAKEN: CPT

## 2025-01-16 PROCEDURE — 3078F DIAST BP <80 MM HG: CPT

## 2025-01-16 PROCEDURE — 1125F AMNT PAIN NOTED PAIN PRSNT: CPT

## 2025-01-16 PROCEDURE — 99213 OFFICE O/P EST LOW 20 MIN: CPT

## 2025-01-16 PROCEDURE — 1170F FXNL STATUS ASSESSED: CPT

## 2025-01-16 RX ORDER — ENALAPRIL MALEATE 5 MG/1
5 TABLET ORAL DAILY
Qty: 30 TABLET | Refills: 11 | Status: SHIPPED | OUTPATIENT
Start: 2025-01-16 | End: 2026-01-16

## 2025-01-16 RX ORDER — AMOXICILLIN 250 MG
1 CAPSULE ORAL DAILY
Qty: 30 TABLET | Refills: 11 | Status: SHIPPED | OUTPATIENT
Start: 2025-01-16 | End: 2026-01-16

## 2025-01-16 RX ORDER — AMMONIUM LACTATE 12 G/100G
LOTION TOPICAL AS NEEDED
Qty: 396 G | Refills: 0 | Status: SHIPPED | OUTPATIENT
Start: 2025-01-16 | End: 2026-01-16

## 2025-01-16 ASSESSMENT — PATIENT HEALTH QUESTIONNAIRE - PHQ9
2. FEELING DOWN, DEPRESSED OR HOPELESS: NOT AT ALL
1. LITTLE INTEREST OR PLEASURE IN DOING THINGS: NOT AT ALL
SUM OF ALL RESPONSES TO PHQ9 QUESTIONS 1 AND 2: 0

## 2025-01-16 ASSESSMENT — PAIN SCALES - GENERAL: PAINLEVEL_OUTOF10: 6

## 2025-01-16 ASSESSMENT — ACTIVITIES OF DAILY LIVING (ADL)
BATHING: INDEPENDENT
DRESSING: INDEPENDENT

## 2025-01-16 ASSESSMENT — ENCOUNTER SYMPTOMS
LOSS OF SENSATION IN FEET: 0
OCCASIONAL FEELINGS OF UNSTEADINESS: 0
DEPRESSION: 0

## 2025-01-16 NOTE — PATIENT INSTRUCTIONS
Thank you for coming in today!   This is a summarized list of things we discussed today and next steps:  - Please start eating high fiber diet, aim for 3 items for high fiber foods a day  - Please call to schedule an appointment for Colonoscopy in May 2025 [phone]  - Please call to schedule an appointment for imaging ordered (CT of your lung) today  [phone]  - Please start taking calcium carbonate-Vitamin D3 1000mg-20mcg and clearlax 17g packet once daily, Zandra-colace once daily  - Please decrease dosage of enalapril 10 mg -> 5 mg  - Please read any attached handouts and medication list  - Please schedule a follow-up apt with me in 3 months. Please call my office at [phone] with any questions.

## 2025-01-16 NOTE — PROGRESS NOTES
Subjective   Patient ID: Leni Berman is a 69 y.o. female who presents for Follow-up.    HPI      Constipation  - takes clearlax daily  - every 3 days  - strains, bristol stool 3  - takes enema once a month when she can't  - colonoscopy needs repeat 5/8/2025 encouraged to schedule    Reviewed med list    Hypertension  BP Readings from Last 3 Encounters:   01/16/25 101/65   11/14/24 112/62   10/24/24 108/69     - meds: enalapril 10 mg daily  - missing doses?: no  - taking BP at home?: no  - smoking: yes, 1 pack per day  - denies HA, chest pains, SOB, LE edema, vision changes      Given advanced directives paperwork    Review of Systems  All other systems reviewed were negative    Current Outpatient Medications on File Prior to Visit   Medication Sig Dispense Refill    acetaminophen (Tylenol) 325 mg tablet Take 2 tablets (650 mg) by mouth every 8 hours. 500 tablet 3    atorvastatin (Lipitor) 40 mg tablet Take 1 tablet (40 mg) by mouth once daily. 30 tablet 11    calcium carbonate-vitamin D3 1,000 mg-20 mcg (800 unit) tablet Take 1 tablet by mouth once daily. 90 tablet 3    ClearLax 17 gram packet       diclofenac sodium (Voltaren) 1 % gel Apply 4.5 inches (4 g) topically 4 times a day. 100 g 1    lidocaine HCL 4 % adhesive patch,medicated Apply 1 patch topically every 24 (twenty four) hours if needed (knee pain). 30 patch 3    magnesium oxide (Mag-Ox) 400 mg (241.3 mg magnesium) tablet Take 1 tablet (400 mg) by mouth once daily.      metFORMIN (Glucophage) 500 mg tablet Take 1 tablet (500 mg) by mouth 2 times daily (morning and late afternoon). 90 tablet 3    Pure Comfort Safety Lancets 30 gauge misc       [DISCONTINUED] enalapril (Vasotec) 10 mg tablet Take 1 tablet (10 mg) by mouth once daily. 30 tablet 11    [DISCONTINUED] Milk of Magnesia 400 mg/5 mL suspension        No current facility-administered medications on file prior to visit.        Objective   Vitals: /65 (BP Location: Left arm, Patient Position:  "Sitting, BP Cuff Size: Adult)   Pulse 76   Temp 35.7 °C (96.2 °F) (Temporal)   Ht 1.702 m (5' 7\")   Wt 78.5 kg (173 lb)   SpO2 97%   BMI 27.10 kg/m²      Physical Exam  General: well-appearing, NAD  HEENT: NC/AT  Cardiac: rrr, no m/r/g  Lungs: normal work of breathing, CTAB  Abdomen: soft, non-tender, non-distended, BS present  Neuro: grossly intact  MSK: No peripheral edema, cyanosis, or pallor  Psych: no depression, anxiety      Assessment/Plan     70 yo PMHx of HTN, HLD, T2DM, Colonic polyps, AUD in remission, MDD, OA of hip/back/knee, spinal stenosis, hx of seizures coming for follow-up. Overall HDS and well appearing.  Constipation new, chronic poorly controlled recommended high-fiber diet and laxatives.  Patient's BP on the lower side we will reduce enalapril dosing from 10 to 5 mg daily and follow-up.  Recommended ammonium lactate for callus and follow-up with podiatry.  Discussed with patient that she needs to repeat low-dose CT scan in 6 months ordered.      Problem List Items Addressed This Visit             ICD-10-CM    Benign essential hypertension I10    Relevant Medications    enalapril (Vasotec) 5 mg tablet     Other Visit Diagnoses         Codes    Constipation, unspecified constipation type    -  Primary K59.00    Relevant Medications    sennosides-docusate sodium (Zandra-Colace) 8.6-50 mg tablet    Callus     L84    Relevant Medications    ammonium lactate (Lac-Hydrin) 12 % lotion            Attending Supervision: discussed with attending physician (cosigner listed on this note).    RTC in 3 mo for HTN (if BP is on lower side can discontinue enalapril or reduce dosing) and we need to order repeat colonoscopy for 5/8/2025, will also plan to repeat labs including A1c or earlier as needed.    Patient discussed with attending physician Dr. Ferraro    Plan preliminary until cosigned by attending physician.    Anabell Amanda M.D.  Family Medicine  PGY-2  "

## 2025-01-17 ENCOUNTER — TREATMENT (OUTPATIENT)
Dept: PHYSICAL THERAPY | Facility: HOSPITAL | Age: 70
End: 2025-01-17
Payer: COMMERCIAL

## 2025-01-17 DIAGNOSIS — Z12.11 COLON CANCER SCREENING: Primary | ICD-10-CM

## 2025-01-17 DIAGNOSIS — M48.061 SPINAL STENOSIS OF LUMBAR REGION WITHOUT NEUROGENIC CLAUDICATION: ICD-10-CM

## 2025-01-17 PROCEDURE — 97110 THERAPEUTIC EXERCISES: CPT | Mod: GP | Performed by: PHYSICAL THERAPIST

## 2025-01-17 RX ORDER — POLYETHYLENE GLYCOL 3350, SODIUM CHLORIDE, SODIUM BICARBONATE, POTASSIUM CHLORIDE 420; 11.2; 5.72; 1.48 G/4L; G/4L; G/4L; G/4L
4000 POWDER, FOR SOLUTION ORAL ONCE
Qty: 4000 ML | Refills: 0 | Status: SHIPPED | OUTPATIENT
Start: 2025-01-17 | End: 2025-01-17

## 2025-01-17 NOTE — PROGRESS NOTES
"Treatment note  Physical Therapy Treatment  Patient Name:Leni Berman  MRN:[id number]  Today's Date:2025  Referred by: Bhavna Ferraro  Time Calculation  Start Time: 945  Stop Time: 1030  Time Calculation (min): 45 min    Therapy Diagnosis  1. Spinal stenosis of lumbar region without neurogenic claudication       Precautions/Fall Risk:none* Pacemaker no  Seizures No  Post Op Movement/Restrictions No    Insurance  Visit number: 4 of 8  Approved number of visits: MN  Onset Date: 2024  Certification Period:  Beginnin/6/2024            Ending: 3/06/2025  Payor: [employer] DUAL COMPLETE / Plan: [employer] DUAL COMPLETE / Product Type: *No Product type* /     Subjective/Pain: Patient reports she is feeling achy today. Complaints in her back and bilateral knees.   Current Pain Level (0-10): 5/10    Objective/Outcome Measures:  TrP/TTP glute med/lumbar paraspinals bilaterally    Treatment  Therapeutic Exercise 45 minutes  Recumbent Stepper 8' Lvl 2   LTR: 10\" x 3' R/L  Supine Hip Bridge: 3\" 2 x 10   DKTC: 10\" x 10  Seated Forward Flexion with PB: 5\" x 3'   Seated Piriformis s: 1' R/L   Seated HS stretch: 30\" x 1 R/L  TG Lvl 6 3 x 10 (3 cords)    Manual Therapy   minutes Not today  Theragun/STM, Segmental mobility T/L spine, Lumbosacral distraction, glute TrP release    Neuromuscular Re-ed   minutes    Gait Training   minutes    Modalities   Vasopneumatic Device       minutes  Electrical Stimulation          minutes  Ultrasound            minutes  Iontophoresis                     minutes  Cold Pack            minutes  Mechanical Traction           minutes    Assessment: Patient requires a moderate degree of verbal and tactile cuing for instruction of exercises to perform correctly this date. Continued POC with focus on mobility, core stabilization and LE mobility and strengthening due to complaints in her knees today.  She fatigues with LE strengthening and continues to require cuing " throughout session.    Plan: Progress with core stabilization and hip/spinal mobility as tolerated by patient    Goals:  Lumbar Goals  Lumbar Spine Goals:  By discharge, patient will:  1) Demonstrate independence with home exercise program  2) Tolerate PLOA without adverse reaction  3) Increase ROM of the lumbar spine to lumbar spine in order to improve the ability to perform essential ADLs  4) Increase strength of bilateral LE to 4+ or > in order to improve the ability to perform essential ADLs  5) Improve positional tolerances of standing, sitting, walking >10 minutes/hours for essential ADLs and work duties.   6) Report decrease in pain by >= 2 points to meet MCID  7) Decrease score of Modified ANATOLY by > 6 points to meet the MCID  8) Ascend and descend 1 flight of stairs without an increase in symptoms  9) Ambulate x community distances without an increase in symptoms  10) Be able to sleep through the night without an increase in symptoms  11) Be able to perform the ADL of lifting objects up to 10 lbs for household duties without an increase or production of symptoms    Patient stated goal: stop pain

## 2025-01-22 ENCOUNTER — APPOINTMENT (OUTPATIENT)
Dept: PHYSICAL THERAPY | Facility: HOSPITAL | Age: 70
End: 2025-01-22
Payer: COMMERCIAL

## 2025-01-23 NOTE — PROGRESS NOTES
I reviewed the resident/fellow's documentation and discussed the patient with the resident/fellow. I agree with the resident/fellow's medical decision making as documented in the note.    Bhavna Ferraro MD

## 2025-02-02 ENCOUNTER — HOSPITAL ENCOUNTER (EMERGENCY)
Facility: HOSPITAL | Age: 70
Discharge: HOME | End: 2025-02-02
Attending: EMERGENCY MEDICINE
Payer: COMMERCIAL

## 2025-02-02 VITALS
RESPIRATION RATE: 16 BRPM | WEIGHT: 165 LBS | SYSTOLIC BLOOD PRESSURE: 129 MMHG | BODY MASS INDEX: 25.9 KG/M2 | TEMPERATURE: 98.6 F | DIASTOLIC BLOOD PRESSURE: 70 MMHG | OXYGEN SATURATION: 100 % | HEIGHT: 67 IN | HEART RATE: 81 BPM

## 2025-02-02 DIAGNOSIS — M79.605 PAIN IN BOTH LOWER EXTREMITIES: ICD-10-CM

## 2025-02-02 DIAGNOSIS — M54.50 CHRONIC BILATERAL LOW BACK PAIN WITHOUT SCIATICA: Primary | ICD-10-CM

## 2025-02-02 DIAGNOSIS — G89.29 CHRONIC BILATERAL LOW BACK PAIN WITHOUT SCIATICA: Primary | ICD-10-CM

## 2025-02-02 DIAGNOSIS — R19.5 DARK STOOLS: ICD-10-CM

## 2025-02-02 DIAGNOSIS — R82.998 DARK URINE: ICD-10-CM

## 2025-02-02 DIAGNOSIS — M79.604 PAIN IN BOTH LOWER EXTREMITIES: ICD-10-CM

## 2025-02-02 LAB
ALBUMIN SERPL BCP-MCNC: 4.5 G/DL (ref 3.4–5)
ALP SERPL-CCNC: 74 U/L (ref 33–136)
ALT SERPL W P-5'-P-CCNC: 20 U/L (ref 7–45)
ANION GAP SERPL CALC-SCNC: 15 MMOL/L (ref 10–20)
APPEARANCE UR: CLEAR
AST SERPL W P-5'-P-CCNC: 31 U/L (ref 9–39)
BASOPHILS # BLD AUTO: 0.04 X10*3/UL (ref 0–0.1)
BASOPHILS NFR BLD AUTO: 0.7 %
BILIRUB SERPL-MCNC: 1.1 MG/DL (ref 0–1.2)
BILIRUB UR STRIP.AUTO-MCNC: NEGATIVE MG/DL
BUN SERPL-MCNC: 7 MG/DL (ref 6–23)
CALCIUM SERPL-MCNC: 9.1 MG/DL (ref 8.6–10.6)
CHLORIDE SERPL-SCNC: 100 MMOL/L (ref 98–107)
CK SERPL-CCNC: 251 U/L (ref 0–215)
CO2 SERPL-SCNC: 27 MMOL/L (ref 21–32)
COLOR UR: YELLOW
CREAT SERPL-MCNC: 0.81 MG/DL (ref 0.5–1.05)
EGFRCR SERPLBLD CKD-EPI 2021: 79 ML/MIN/1.73M*2
EOSINOPHIL # BLD AUTO: 0.05 X10*3/UL (ref 0–0.7)
EOSINOPHIL NFR BLD AUTO: 0.9 %
ERYTHROCYTE [DISTWIDTH] IN BLOOD BY AUTOMATED COUNT: 15.8 % (ref 11.5–14.5)
GLUCOSE SERPL-MCNC: 105 MG/DL (ref 74–99)
GLUCOSE UR STRIP.AUTO-MCNC: NORMAL MG/DL
HCT VFR BLD AUTO: 40 % (ref 36–46)
HGB BLD-MCNC: 13.3 G/DL (ref 12–16)
HOLD SPECIMEN: NORMAL
IMM GRANULOCYTES # BLD AUTO: 0.02 X10*3/UL (ref 0–0.7)
IMM GRANULOCYTES NFR BLD AUTO: 0.3 % (ref 0–0.9)
KETONES UR STRIP.AUTO-MCNC: NEGATIVE MG/DL
LEUKOCYTE ESTERASE UR QL STRIP.AUTO: NEGATIVE
LYMPHOCYTES # BLD AUTO: 2.14 X10*3/UL (ref 1.2–4.8)
LYMPHOCYTES NFR BLD AUTO: 37.1 %
MCH RBC QN AUTO: 25.8 PG (ref 26–34)
MCHC RBC AUTO-ENTMCNC: 33.3 G/DL (ref 32–36)
MCV RBC AUTO: 78 FL (ref 80–100)
MONOCYTES # BLD AUTO: 0.5 X10*3/UL (ref 0.1–1)
MONOCYTES NFR BLD AUTO: 8.7 %
MUCOUS THREADS #/AREA URNS AUTO: NORMAL /LPF
NEUTROPHILS # BLD AUTO: 3.02 X10*3/UL (ref 1.2–7.7)
NEUTROPHILS NFR BLD AUTO: 52.3 %
NITRITE UR QL STRIP.AUTO: NEGATIVE
NRBC BLD-RTO: 0.3 /100 WBCS (ref 0–0)
PH UR STRIP.AUTO: 6 [PH]
PLATELET # BLD AUTO: 190 X10*3/UL (ref 150–450)
POTASSIUM SERPL-SCNC: 3.9 MMOL/L (ref 3.5–5.3)
PROT SERPL-MCNC: 7.6 G/DL (ref 6.4–8.2)
PROT UR STRIP.AUTO-MCNC: ABNORMAL MG/DL
RBC # BLD AUTO: 5.16 X10*6/UL (ref 4–5.2)
RBC # UR STRIP.AUTO: NEGATIVE /UL
RBC #/AREA URNS AUTO: NORMAL /HPF
SODIUM SERPL-SCNC: 138 MMOL/L (ref 136–145)
SP GR UR STRIP.AUTO: 1.03
SQUAMOUS #/AREA URNS AUTO: NORMAL /HPF
UROBILINOGEN UR STRIP.AUTO-MCNC: ABNORMAL MG/DL
WBC # BLD AUTO: 5.8 X10*3/UL (ref 4.4–11.3)
WBC #/AREA URNS AUTO: NORMAL /HPF

## 2025-02-02 PROCEDURE — 2500000001 HC RX 250 WO HCPCS SELF ADMINISTERED DRUGS (ALT 637 FOR MEDICARE OP): Performed by: EMERGENCY MEDICINE

## 2025-02-02 PROCEDURE — 99284 EMERGENCY DEPT VISIT MOD MDM: CPT | Performed by: EMERGENCY MEDICINE

## 2025-02-02 PROCEDURE — 85025 COMPLETE CBC W/AUTO DIFF WBC: CPT

## 2025-02-02 PROCEDURE — 99283 EMERGENCY DEPT VISIT LOW MDM: CPT | Performed by: EMERGENCY MEDICINE

## 2025-02-02 PROCEDURE — 82550 ASSAY OF CK (CPK): CPT | Performed by: EMERGENCY MEDICINE

## 2025-02-02 PROCEDURE — 81001 URINALYSIS AUTO W/SCOPE: CPT

## 2025-02-02 PROCEDURE — 36415 COLL VENOUS BLD VENIPUNCTURE: CPT | Performed by: EMERGENCY MEDICINE

## 2025-02-02 PROCEDURE — 84075 ASSAY ALKALINE PHOSPHATASE: CPT

## 2025-02-02 RX ORDER — ACETAMINOPHEN 325 MG/1
650 TABLET ORAL ONCE
Status: COMPLETED | OUTPATIENT
Start: 2025-02-02 | End: 2025-02-02

## 2025-02-02 RX ADMIN — ACETAMINOPHEN 650 MG: 325 TABLET ORAL at 10:15

## 2025-02-02 ASSESSMENT — PAIN DESCRIPTION - LOCATION: LOCATION: LEG

## 2025-02-02 ASSESSMENT — PAIN SCALES - GENERAL
PAINLEVEL_OUTOF10: 0 - NO PAIN
PAINLEVEL_OUTOF10: 5 - MODERATE PAIN
PAINLEVEL_OUTOF10: 0 - NO PAIN
PAINLEVEL_OUTOF10: 7

## 2025-02-02 ASSESSMENT — PAIN - FUNCTIONAL ASSESSMENT: PAIN_FUNCTIONAL_ASSESSMENT: 0-10

## 2025-02-02 ASSESSMENT — LIFESTYLE VARIABLES
TOTAL SCORE: 0
HAVE PEOPLE ANNOYED YOU BY CRITICIZING YOUR DRINKING: NO
EVER HAD A DRINK FIRST THING IN THE MORNING TO STEADY YOUR NERVES TO GET RID OF A HANGOVER: NO
EVER FELT BAD OR GUILTY ABOUT YOUR DRINKING: NO
HAVE YOU EVER FELT YOU SHOULD CUT DOWN ON YOUR DRINKING: NO

## 2025-02-02 NOTE — ED TRIAGE NOTES
BIB [facility] for multiple medical complaints. Pt c/o muscle spasms in LE & burning during urination.   HX:HTN

## 2025-02-02 NOTE — DISCHARGE INSTRUCTIONS
You were seen in the emergency department for your concerns.  There are no serious signs of your back pain suggesting damage to your vital organs or your spine.  Please continue to follow-up with your outpatient doctors for this.  I suspect that this pain does continue to cause you some discomfort in your legs  Please talk to your primary care doctor about a medication like gabapentin but I do not feel comfortable prescribing it from here at this time because it sounds like they have been very helpful in managing your discomfort in the office.  That said, I do recommend that you continue to take Tylenol for your pain and please be sure to take the recommended dose of 650 mg instead of 325 that you were taking as I feel that this will be more likely to help your symptoms by using this larger dose.  We also took a look at your darker urine and dark bowel movements.  Your urine testing came back normal and does not look like it is infected, I do recommend drinking more water if you feel that your urine looks dark.  Additionally please follow-up with your gastroenterologist about your dark bowel movements, it does not look like you are having a serious issue right now but I did send a new referral for you to try to help you get in sooner

## 2025-02-02 NOTE — ED PROVIDER NOTES
"History of Present Illness     History provided by: Patient  Limitations to History: None  External Records Reviewed with Brief Summary:  Previous ED records showing the patient has been seen for previous complaints found to have no acute findings but did have evidence of L-spine stenosis.  Also saw that these were some chronic findings for her and she follows up with orthospine for this.    HPI:  Leni Berman is a 69 y.o. female with medical history notable for HLD on atorvastatin, hypertension on enalapril, and diabetes on metformin.  She is coming in with multiple medical complaints as follows: Back and leg pain, dark urine, dark bowel movements.  All of the symptoms been ongoing for about 3 days    Pain in her low back and her legs bilaterally from the knees down.  Describes her pain as aching, leg pain feels similar to her arthritis without true myalgias, denies any swelling.  No fevers, chills, sweats.  No true changes in her back pain from her baseline.  Additionally, leg pain is more described as \"pins-and-needles sensation, no weakness.    Also complaining of dark urine and dark bowel movements also starting about 3 days ago.  Reported burning with urination to nurse but denied any painful urination to me, also no changes in urinary frequency or bowel frequency just has loose bowel movements when she does have a bowel movement.  Otherwise feels that it is dark or possibly black.    Denies CP/SOB, N/V, dizziness, lightheadedness, numbness, weakness    Physical Exam   Triage vitals:  T 36.7 °C (98 °F)  HR 91  /71  RR 14  O2 100 %      General: Awake, alert, in no acute distress  Eyes: Gaze conjugate.  No scleral icterus or injection, PERRL  HENT: Normo-cephalic, atraumatic. No stridor   CV: Regular rate, regular rhythm. Radial and DP pulses 2+ bilaterally  Resp: Breathing non-labored, speaking in full sentences.  Clear to auscultation bilaterally  Chest: non-tender  GI: Soft, non-distended, " non-tender. No rebound or guarding.  MSK/Extremities: No gross bony deformities. Moving all extremities.  No pain through passive or active range of motion during my examination.  Additionally there is no pain with palpation of the midline spine or paraspinal muscles  Skin: Warm. Appropriate color.  No bruising or rashes around the lower back  Neuro: Alert. Orientedx4. Face symmetric. Speech is fluent.  Strength and sensation fully intact in b/l UE and LEs, most notably there is no weakness anywhere in the bilateral lower extremities,  Psych: Appropriate mood and affect    Digital rectal examination: No true melanotic stools but some amount of stool did appear darker than expected.  No gross blood.  Was performed with female chaperone    Medical Decision Making & ED Course   Medical Decision Makin y.o. female with above and physical coming in with multiple concerns.  Greatest concern arises from possibility that darkened urine, darkened stools, and myalgias male be presenting at the same time due to increased muscle breakdown which may be a sequela of atorvastatin use or sign of a porphyria or other hemoglobin disease/red cell breakdown.  No signs of this in blood work or urinalysis.  No signs of UTI, suspect that this darkened urine is likely due to concentration, recommended the patient continue to drink more water.  On digital rectal exam I did find that stools were slightly darker than expected without evidence of bright red blood.  If there is any blood in stool sample it is a very slight amount and is not acutely concerning.  Recommend outpatient follow-up with GI and I gave patient return precautions with regard to what may be a sign of a larger GI bleed.  Given patient's history of relatively recent colonoscopy with multiple biopsies, I suspect that there could be some bleeding in the colon that causes dark stools although it is very slight if at all.  No red flag signs of back pain as patient has no  "tenderness to the midline spine or tenderness (which would suggest possible acute fractures) in the paraspinal regions, no bruising suggestive of retroperitoneal hematoma, no fevers or noted drug use so low suspicion for epidural abscess, no weakness which suggest that patient is not suffering from any cord compression/cauda equina and in the same vein there is specific denial of loss of bowel or bladder control.  For these reasons I suspect that this is chronic back pain without even a true exacerbation as patient states that it is consistent with her usual pain.  States that she is concerned about a because she feels that her Tylenol is not working but states that she has not been taking the full prescribed dose as she \"does not like taking pills\" states that she has only been taking 1 tablet (325 mg) of her 2 tablet regimen during times of pain.  I educated patient on this that a higher dose would lead to greater pain resolution and dosed 650 of Tylenol here with good pain control.  Recommended the patient continue to take 650 of Tylenol to assist with her pain in the future and to follow-up with her outpatient doctor if she develops any other worsening pains.    ED Course:  ED Course as of 02/02/25 1225   Sun Feb 02, 2025   0974 ATTENDING ATTESTATION   69-year-old female with multiple medical comorbidities as mentioned above most notable for chronic low back pain presenting to the emergency department for acute on chronic atraumatic exacerbation of her chronic low back pain.  The patient endorses some diffuse myalgias but principally centered to the lower legs it is not modified by movement, the patient demonstrates stable gait normal ambulation, no trauma or injury, nonfocal neuroexam with symmetric in strength and sensation in the lower extremities, denies any abdominal pain has normal distal pulses DP and PT with no concern for pathology such as spine issue like abscess, clot, mass lesion bleeding, also no " concern for vascular pathology such as aortic dissection, occlusion.  She has no midline CT or L-spine tenderness step-off or deformity no CVA tenderness.    Patient describes her pain as typical for her normal exacerbations for her, in the absence of  trauma or illness no indication for imaging at this point, the patient endorses some dark urine given her history of being on a statin with diffuse muscle pain we will obtain a CK,  look for myoglobin in the urine.  She is having no urinary symptoms whatsoever just endorses dark urine has a soft belly lowering my suspicion for infection.  We will treat the patient's pain, monitor for improvement of symptoms disposition pending anticipate home  [facility] [[facility]]      ED Course User Index  [RH] Gregg PATEL Orantes, DO         Diagnoses as of 02/02/25 1225   Dark stools   Chronic bilateral low back pain without sciatica   Pain in both lower extremities   Dark urine         ---       Social Determinants of Health which Significantly Impact Care: None identified     EKG Independent Interpretation: EKG not obtained    The patient was discussed with the following consultants/services: None    Independent Result Review and Interpretation: Relevant laboratory and radiographic results were reviewed and independently interpreted by myself.  As necessary, they are commented on in the ED Course.    Chronic conditions affecting the patient's care: As documented above in MDM    Care Considerations: As documented above in MDM    Disposition   As a result of the work-up, the patient was discharged home.  she was informed of her diagnosis and instructed to come back with any concerns or worsening of condition.  she and was agreeable to the plan as discussed above.  she was given the opportunity to ask questions.  All of the patient's questions were answered.    Procedures   Procedures    This was a shared visit with an ED attending.  The patient was seen and discussed with the ED  attending    Stu Bauer MD  Emergency Medicine     Stu Bauer MD  Resident  02/02/25 7045

## 2025-02-05 ENCOUNTER — TREATMENT (OUTPATIENT)
Dept: PHYSICAL THERAPY | Facility: HOSPITAL | Age: 70
End: 2025-02-05
Payer: COMMERCIAL

## 2025-02-05 DIAGNOSIS — M48.061 SPINAL STENOSIS OF LUMBAR REGION WITHOUT NEUROGENIC CLAUDICATION: ICD-10-CM

## 2025-02-05 PROCEDURE — 97110 THERAPEUTIC EXERCISES: CPT | Mod: GP,CQ

## 2025-02-05 NOTE — PROGRESS NOTES
Physical Therapy Treatment    Patient Name:Leni Berman  MRN:[id number]  Today's Date:2025  Referred by: Bhavna Ferraro  Time Calculation  Start Time: 929  Stop Time:   Time Calculation (min): 51 min    Therapy Diagnosis  Problem List Items Addressed This Visit             ICD-10-CM    Spinal stenosis of lumbar region without neurogenic claudication M48.061       Assessment:   Pt tolerated treatment well this date. Pt with improvements in B knee and lumbar ROM as duration of session elapsed. Pt tolerating all of therex without discomfort and reports feeling more relaxed. Pt tolerating increased resistance and reps during exercises and with improved ROM during henrique pose/prone on elbows and cat/cow. Pt to continue focus on stretching and core/abdominal strengthening to reduce symptoms.     Plan:    Progress with core stabilization and hip/spinal mobility as tolerated by patient     Insurance  Visit number: 5  Approved number of visits: MN  Onset Date: 2024  Certification Period:  Beginnin/6/2024            Ending: 3/06/2025  Payor: [employer] COMPLETE / Plan: [employer] DUAL COMPLETE / Product Type: *No Product type* /       Precautions/Fall Risk: none* Pacemaker no  Seizures No  Post Op Movement/Restrictions No     Subjective/Pain: Pt with c/o a throbbing ache pain in B knees and low back. She does note she experiences radicular symptoms from time to time, which is why she went to ED on . She believes the weather and temperature fluctuating has increased her pain.   Current Pain Level (0-10): 6      HEP Compliance: Good    Objective/Outcome Measures:  Min verbal and tactile cues/demo required for most therex    Improved lumbar ROM with therex and stretches    Treatment  Therapeutic Procedure PT Therapeutic Procedures Time Entry  Therapeutic Exercise Time Entry: 51 minutes      Therapeutic Exercise 51 minutes  Recumbent Stepper 8' Lvl 3  3 mins sciatic glides on swiss  "ball  2 mins LTR on swiss ball  Side lying red clam shells x 20 ea side  2 mins hook lying marches  Hook lying hip add iso + bridges 2 x 10  Hook lying hip add iso + DKTC 4 x 5  TG blue & yellow, Lvl 7 DL Squat 3 x 10  Deena pose <> prone on elbows 2 x 30s holds ea  Cat/Cow x 2 mins  Long sitting B HS stretch 4 x 15s holds    Not today  LTR: 10\" x 3' R/L  Supine Hip Bridge: 3\" 2 x 10   DKTC: 10\" x 10  Seated Forward Flexion with PB: 5\" x 3'   Seated Piriformis s: 1' R/L   Seated HS stretch: 30\" x 1 R/L      Manual Therapy   minutes  Not today  Theragun/STM, Segmental mobility T/L spine, Lumbosacral distraction, glute TrP release       Neuromuscular Re-ed   minutes      Gait Training   minutes    Modalities   Vasopneumatic Device       minutes  Electrical Stimulation            minutes  Ultrasound                      minutes  Iontophoresis                     minutes  Cold Pack                        minutes  Mechanical Traction           minutes    OP EDUCATION:       Goals:  Lumbar Goals  Lumbar Spine Goals:  By discharge, patient will:  1) Demonstrate independence with home exercise program  2) Tolerate PLOA without adverse reaction  3) Increase ROM of the lumbar spine to lumbar spine in order to improve the ability to perform essential ADLs  4) Increase strength of bilateral LE to 4+ or > in order to improve the ability to perform essential ADLs  5) Improve positional tolerances of standing, sitting, walking >10 minutes/hours for essential ADLs and work duties.   6) Report decrease in pain by >= 2 points to meet MCID  7) Decrease score of Modified ANATOLY by > 6 points to meet the MCID  8) Ascend and descend 1 flight of stairs without an increase in symptoms  9) Ambulate x community distances without an increase in symptoms  10) Be able to sleep through the night without an increase in symptoms  11) Be able to perform the ADL of lifting objects up to 10 lbs for household duties without an increase or production of " symptoms     Patient stated goal: stop pain

## 2025-02-11 ENCOUNTER — TREATMENT (OUTPATIENT)
Dept: PHYSICAL THERAPY | Facility: HOSPITAL | Age: 70
End: 2025-02-11
Payer: COMMERCIAL

## 2025-02-11 ENCOUNTER — TELEPHONE (OUTPATIENT)
Facility: HOSPITAL | Age: 70
End: 2025-02-11

## 2025-02-11 DIAGNOSIS — M48.061 SPINAL STENOSIS OF LUMBAR REGION WITHOUT NEUROGENIC CLAUDICATION: ICD-10-CM

## 2025-02-11 PROCEDURE — 97110 THERAPEUTIC EXERCISES: CPT | Mod: GP | Performed by: PHYSICAL THERAPIST

## 2025-02-11 NOTE — PROGRESS NOTES
"Physical Therapy Treatment    Patient Name:Leni Berman  MRN:[id number]  Today's Date:2025  Referred by: Bhavna Ferraro  Time Calculation  Start Time: 1050  Stop Time: 1145  Time Calculation (min): 55 min    Therapy Diagnosis  Problem List Items Addressed This Visit             ICD-10-CM    Spinal stenosis of lumbar region without neurogenic claudication M48.061       Assessment: Patient presenting with improved tolerance to activities and less/no pain in lumbar spine. Compliance with HEP and therapy POC have helped improve this. She requires verbal and tactile cuing for instruction of exercises but is able to perform correctly.     Plan:    Progress with core stabilization and hip/spinal mobility as tolerated by patient     Insurance  Visit number: 6 of 8  Approved number of visits: MN  Onset Date: 2024  Certification Period:  Beginnin/6/2024            Ending: 3/06/2025  Payor: [employer] COMPLETE / Plan: [employer] DUAL COMPLETE / Product Type: *No Product type* /       Precautions/Fall Risk: none* Pacemaker no  Seizures No  Post Op Movement/Restrictions No     Subjective/Pain: Pt presents today without reports of pain. States she has been doing pretty good.  Current Pain Level (0-10): 6    HEP Compliance: Good    Objective/Outcome Measures:  Min verbal and tactile cues/demo required for most therex    Improved lumbar ROM with therex and stretches    Treatment  Therapeutic Procedure PT Therapeutic Procedures Time Entry  Therapeutic Exercise Time Entry: 55 minutes      Therapeutic Exercise 55 minutes  Recumbent Stepper 8' Lvl 4  PB DKTC: 10\" x 10   2 mins LTR on swiss ball  Side lying red clam shells x 20 ea side  Squat Sits: 5 lbs x 10   Hook lying hip add iso + PPT 2 x 10  Hook lying hip add iso + PPT 4 x 5  Supine Hip Bridge: 3\" 2 x 10   Seated Piriformis s: 1' R/L       Manual Therapy   minutes  Not today  Theragun/STM, Segmental mobility T/L spine, Lumbosacral " distraction, glute TrP release       Neuromuscular Re-ed   minutes      Gait Training   minutes    Modalities   Vasopneumatic Device       minutes  Electrical Stimulation            minutes  Ultrasound                      minutes  Iontophoresis                     minutes  Cold Pack                        minutes  Mechanical Traction           minutes    OP EDUCATION:       Goals:  Lumbar Goals  Lumbar Spine Goals:  By discharge, patient will:  1) Demonstrate independence with home exercise program  2) Tolerate PLOA without adverse reaction  3) Increase ROM of the lumbar spine to lumbar spine in order to improve the ability to perform essential ADLs  4) Increase strength of bilateral LE to 4+ or > in order to improve the ability to perform essential ADLs  5) Improve positional tolerances of standing, sitting, walking >10 minutes/hours for essential ADLs and work duties.   6) Report decrease in pain by >= 2 points to meet MCID  7) Decrease score of Modified ANATOLY by > 6 points to meet the MCID  8) Ascend and descend 1 flight of stairs without an increase in symptoms  9) Ambulate x community distances without an increase in symptoms  10) Be able to sleep through the night without an increase in symptoms  11) Be able to perform the ADL of lifting objects up to 10 lbs for household duties without an increase or production of symptoms     Patient stated goal: stop pain

## 2025-02-11 NOTE — TELEPHONE ENCOUNTER
Called patient and left a message informing her to schedule follow-up with orthopedics if her back pain is not improving despite physical therapy as she was recently seen in the emergency room for that back pain

## 2025-02-20 ENCOUNTER — HOSPITAL ENCOUNTER (EMERGENCY)
Facility: HOSPITAL | Age: 70
Discharge: HOME | End: 2025-02-20
Attending: EMERGENCY MEDICINE
Payer: COMMERCIAL

## 2025-02-20 VITALS
SYSTOLIC BLOOD PRESSURE: 119 MMHG | WEIGHT: 165 LBS | HEART RATE: 69 BPM | TEMPERATURE: 97.9 F | HEIGHT: 67 IN | RESPIRATION RATE: 20 BRPM | BODY MASS INDEX: 25.9 KG/M2 | OXYGEN SATURATION: 99 % | DIASTOLIC BLOOD PRESSURE: 75 MMHG

## 2025-02-20 DIAGNOSIS — R31.9 URINARY TRACT INFECTION WITH HEMATURIA, SITE UNSPECIFIED: ICD-10-CM

## 2025-02-20 DIAGNOSIS — K59.00 CONSTIPATION, UNSPECIFIED CONSTIPATION TYPE: Primary | ICD-10-CM

## 2025-02-20 DIAGNOSIS — N39.0 URINARY TRACT INFECTION WITH HEMATURIA, SITE UNSPECIFIED: ICD-10-CM

## 2025-02-20 LAB
APPEARANCE UR: ABNORMAL
BILIRUB UR STRIP.AUTO-MCNC: NEGATIVE MG/DL
COLOR UR: YELLOW
GLUCOSE UR STRIP.AUTO-MCNC: NORMAL MG/DL
HCT VFR BLD AUTO: 39.5 % (ref 36–46)
HGB BLD-MCNC: 13.2 G/DL (ref 12–16)
KETONES UR STRIP.AUTO-MCNC: NEGATIVE MG/DL
LEUKOCYTE ESTERASE UR QL STRIP.AUTO: ABNORMAL
MUCOUS THREADS #/AREA URNS AUTO: ABNORMAL /LPF
NITRITE UR QL STRIP.AUTO: NEGATIVE
PH UR STRIP.AUTO: 7 [PH]
PROT UR STRIP.AUTO-MCNC: ABNORMAL MG/DL
RBC # UR STRIP.AUTO: ABNORMAL MG/DL
RBC #/AREA URNS AUTO: >20 /HPF
SP GR UR STRIP.AUTO: 1.02
SQUAMOUS #/AREA URNS AUTO: ABNORMAL /HPF
UROBILINOGEN UR STRIP.AUTO-MCNC: NORMAL MG/DL
WBC #/AREA URNS AUTO: >50 /HPF

## 2025-02-20 PROCEDURE — 99284 EMERGENCY DEPT VISIT MOD MDM: CPT

## 2025-02-20 PROCEDURE — 87086 URINE CULTURE/COLONY COUNT: CPT | Performed by: EMERGENCY MEDICINE

## 2025-02-20 PROCEDURE — 85018 HEMOGLOBIN: CPT

## 2025-02-20 PROCEDURE — 36415 COLL VENOUS BLD VENIPUNCTURE: CPT

## 2025-02-20 PROCEDURE — 99283 EMERGENCY DEPT VISIT LOW MDM: CPT | Performed by: EMERGENCY MEDICINE

## 2025-02-20 PROCEDURE — 81001 URINALYSIS AUTO W/SCOPE: CPT | Performed by: EMERGENCY MEDICINE

## 2025-02-20 RX ORDER — DOCUSATE SODIUM 100 MG/1
100 CAPSULE, LIQUID FILLED ORAL EVERY 12 HOURS
Qty: 60 CAPSULE | Refills: 0 | Status: SHIPPED | OUTPATIENT
Start: 2025-02-20 | End: 2025-03-22

## 2025-02-20 RX ORDER — CEPHALEXIN 500 MG/1
500 CAPSULE ORAL 4 TIMES DAILY
Qty: 20 CAPSULE | Refills: 0 | Status: SHIPPED | OUTPATIENT
Start: 2025-02-20 | End: 2025-02-20

## 2025-02-20 RX ORDER — CEPHALEXIN 500 MG/1
500 CAPSULE ORAL 4 TIMES DAILY
Qty: 20 CAPSULE | Refills: 0 | Status: SHIPPED | OUTPATIENT
Start: 2025-02-20 | End: 2025-02-27

## 2025-02-20 RX ORDER — POLYETHYLENE GLYCOL 3350 17 G/17G
17 POWDER, FOR SOLUTION ORAL DAILY
Qty: 510 G | Refills: 0 | Status: SHIPPED | OUTPATIENT
Start: 2025-02-20 | End: 2025-03-22

## 2025-02-20 ASSESSMENT — LIFESTYLE VARIABLES
TOTAL SCORE: 0
HAVE YOU EVER FELT YOU SHOULD CUT DOWN ON YOUR DRINKING: NO
HAVE PEOPLE ANNOYED YOU BY CRITICIZING YOUR DRINKING: NO
EVER FELT BAD OR GUILTY ABOUT YOUR DRINKING: NO
EVER HAD A DRINK FIRST THING IN THE MORNING TO STEADY YOUR NERVES TO GET RID OF A HANGOVER: NO

## 2025-02-20 ASSESSMENT — COLUMBIA-SUICIDE SEVERITY RATING SCALE - C-SSRS
6. HAVE YOU EVER DONE ANYTHING, STARTED TO DO ANYTHING, OR PREPARED TO DO ANYTHING TO END YOUR LIFE?: NO
1. IN THE PAST MONTH, HAVE YOU WISHED YOU WERE DEAD OR WISHED YOU COULD GO TO SLEEP AND NOT WAKE UP?: NO
2. HAVE YOU ACTUALLY HAD ANY THOUGHTS OF KILLING YOURSELF?: NO

## 2025-02-20 NOTE — ED TRIAGE NOTES
Pt presents to the ED for complaints of constipation that has been on going for the last couple of days. Pt states she has not had a bowel movement in the last few days. Pt states she is having blood coming from her rectum and states its brown in color. Pt [place] at [facility] and states this was the only way she was able to have a BM.

## 2025-02-20 NOTE — ED PROVIDER NOTES
HPI   Chief Complaint   Patient presents with    Constipation       69-year-old female past medical history seizure disorder, HTN, HLD, T2DM, presents to ED for chief complaints of constipation. Patient reports that they had been experiencing constipation ongoing for off-and-on some significant time now.  Patient reports that for the past 3 days they have been notably constipated.  Patient reports that they did  an enema from the store today which they say they did have a bowel movement after this.  Patient reports that she had patient reports however that she had a small amount of blood on toilet paper when she went to wipe prompting her to come to the ED.  Denies any fever, chills, nausea, vomiting, shortness of breath.                Patient History   Past Medical History:   Diagnosis Date    Diabetes mellitus (Multi)     Hypertension      No past surgical history on file.  No family history on file.  Social History     Tobacco Use    Smoking status: Every Day     Types: Cigarettes    Smokeless tobacco: Never   Substance Use Topics    Alcohol use: Yes    Drug use: Never       Physical Exam   ED Triage Vitals [02/20/25 1449]   Temperature Heart Rate Respirations BP   36.6 °C (97.9 °F) 69 20 119/75      Pulse Ox Temp Source Heart Rate Source Patient Position   99 % Tympanic Monitor Sitting      BP Location FiO2 (%)     Left arm --       Physical Exam  Vitals and nursing note reviewed.   Constitutional:       General: She is not in acute distress.     Appearance: She is normal weight. She is not ill-appearing or toxic-appearing.   HENT:      Head: Normocephalic and atraumatic.   Cardiovascular:      Rate and Rhythm: Normal rate and regular rhythm.      Heart sounds: Normal heart sounds. No murmur heard.     No friction rub. No gallop.   Pulmonary:      Effort: Pulmonary effort is normal. No respiratory distress.      Breath sounds: No stridor. No wheezing, rhonchi or rales.   Abdominal:      General: Abdomen  is flat. Bowel sounds are normal. There is no distension.      Palpations: Abdomen is soft. There is no mass.      Tenderness: There is no abdominal tenderness. There is no guarding or rebound.      Hernia: No hernia is present.   Genitourinary:     Rectum: Normal.      Comments: Rectal exam completed.  Chaperone in room.  No visualized external lesions.  Normal rectal tone.  No palpated internal lesions.  Unremarkable exam.  Neurological:      Mental Status: She is alert.           ED Course & MDM   Diagnoses as of 02/20/25 1946   Constipation, unspecified constipation type   Urinary tract infection with hematuria, site unspecified                 No data recorded     Young Coma Scale Score: 15 (02/20/25 1450 : Osmel Rousseau IV, RN)                           Medical Decision Making  69-year-old female presents emergency department presents ED for chief complaints of constipation.  Patient reports that she has been constipated for the past 3 days and that this morning she took an enema and had a bowel movement after.  Patient reports that the stool was very hard and small lumps in nature.  Patient is concerned because she had some blood on toilet paper when she wiped.  Denies any shortness of breath, dizziness, lightheadedness, fever, chills, nausea, vomiting.  With this in mind we will obtain rectal exam at bedside as well as check H&H.    4:33 PM H&H within normal limits. Rectal exam complete reviewed patient's d at bedside with no visualized hemorrhoids or lesions.  UA also ordered given patient's possible concern for hematuria. Reviewed patient's UA done here today and did show possible evidence of urinary tract infection.  Spoke with patient and she does endorse having some urinary frequency and slight dysuria for some time now.  Given patient's symptoms and UA here believe this patient benefit from treatment of urinary tract infection at this time.  Patient reportedly has been able to tolerate  ceftriaxone in the past given her reported penicillin allergy.  With this in mind patient given prescription for Keflex.  Medication sent to pharmacy for constipation.  Patient was advised of findings and did show understanding.  Spoke with patient regarding symptomatic management moving forward as well strict return precautions.  Patient did have opportunity ask questions and had them answered and had no further complaints at this time and was amenable to plan moving forward for discharge.        Procedure  Procedures     Sunny Early PA-C  02/20/25 1948

## 2025-02-20 NOTE — DISCHARGE INSTRUCTIONS
Your urine sample did show evidence of UTI.  Medications were sent to the pharmacy for constipation.    Return to the emergency department for any new or worsening symptoms.

## 2025-02-21 LAB
BACTERIA UR CULT: NORMAL
HOLD SPECIMEN: NORMAL

## 2025-03-07 ENCOUNTER — APPOINTMENT (OUTPATIENT)
Dept: RADIOLOGY | Facility: HOSPITAL | Age: 70
End: 2025-03-07
Payer: COMMERCIAL

## 2025-03-07 ENCOUNTER — HOSPITAL ENCOUNTER (EMERGENCY)
Facility: HOSPITAL | Age: 70
Discharge: HOME | End: 2025-03-07
Attending: EMERGENCY MEDICINE
Payer: COMMERCIAL

## 2025-03-07 ENCOUNTER — CLINICAL SUPPORT (OUTPATIENT)
Dept: EMERGENCY MEDICINE | Facility: HOSPITAL | Age: 70
End: 2025-03-07
Payer: COMMERCIAL

## 2025-03-07 VITALS
HEIGHT: 67 IN | OXYGEN SATURATION: 94 % | DIASTOLIC BLOOD PRESSURE: 77 MMHG | RESPIRATION RATE: 18 BRPM | SYSTOLIC BLOOD PRESSURE: 131 MMHG | WEIGHT: 165 LBS | TEMPERATURE: 99.5 F | HEART RATE: 80 BPM | BODY MASS INDEX: 25.9 KG/M2

## 2025-03-07 DIAGNOSIS — J18.9 PNEUMONIA OF RIGHT LOWER LOBE DUE TO INFECTIOUS ORGANISM: ICD-10-CM

## 2025-03-07 DIAGNOSIS — Z72.0 TOBACCO USE: ICD-10-CM

## 2025-03-07 DIAGNOSIS — J10.1 INFLUENZA A: ICD-10-CM

## 2025-03-07 DIAGNOSIS — R53.1 GENERALIZED WEAKNESS: Primary | ICD-10-CM

## 2025-03-07 DIAGNOSIS — R06.2 WHEEZING: ICD-10-CM

## 2025-03-07 LAB
ALBUMIN SERPL BCP-MCNC: 3.7 G/DL (ref 3.4–5)
ALP SERPL-CCNC: 61 U/L (ref 33–136)
ALT SERPL W P-5'-P-CCNC: 17 U/L (ref 7–45)
ANION GAP BLDV CALCULATED.4IONS-SCNC: 10 MMOL/L (ref 10–25)
ANION GAP SERPL CALC-SCNC: 13 MMOL/L (ref 10–20)
APPEARANCE UR: CLEAR
AST SERPL W P-5'-P-CCNC: 22 U/L (ref 9–39)
ATRIAL RATE: 90 BPM
BASE EXCESS BLDV CALC-SCNC: 0.9 MMOL/L (ref -2–3)
BASOPHILS # BLD AUTO: 0.02 X10*3/UL (ref 0–0.1)
BASOPHILS NFR BLD AUTO: 0.5 %
BILIRUB SERPL-MCNC: 0.3 MG/DL (ref 0–1.2)
BILIRUB UR STRIP.AUTO-MCNC: NEGATIVE MG/DL
BODY TEMPERATURE: 37 DEGREES CELSIUS
BUN SERPL-MCNC: 10 MG/DL (ref 6–23)
CA-I BLDV-SCNC: 1.12 MMOL/L (ref 1.1–1.33)
CALCIUM SERPL-MCNC: 8.4 MG/DL (ref 8.6–10.6)
CHLORIDE BLDV-SCNC: 100 MMOL/L (ref 98–107)
CHLORIDE SERPL-SCNC: 101 MMOL/L (ref 98–107)
CO2 SERPL-SCNC: 27 MMOL/L (ref 21–32)
COLOR UR: NORMAL
CREAT SERPL-MCNC: 1.01 MG/DL (ref 0.5–1.05)
EGFRCR SERPLBLD CKD-EPI 2021: 60 ML/MIN/1.73M*2
EOSINOPHIL # BLD AUTO: 0 X10*3/UL (ref 0–0.7)
EOSINOPHIL NFR BLD AUTO: 0 %
ERYTHROCYTE [DISTWIDTH] IN BLOOD BY AUTOMATED COUNT: 15.6 % (ref 11.5–14.5)
FLUAV RNA RESP QL NAA+PROBE: DETECTED
FLUBV RNA RESP QL NAA+PROBE: NOT DETECTED
GLUCOSE BLDV-MCNC: 212 MG/DL (ref 74–99)
GLUCOSE SERPL-MCNC: 207 MG/DL (ref 74–99)
GLUCOSE UR STRIP.AUTO-MCNC: NORMAL MG/DL
HCO3 BLDV-SCNC: 26.6 MMOL/L (ref 22–26)
HCT VFR BLD AUTO: 34.5 % (ref 36–46)
HCT VFR BLD EST: 37 % (ref 36–46)
HGB BLD-MCNC: 11.7 G/DL (ref 12–16)
HGB BLDV-MCNC: 12.3 G/DL (ref 12–16)
IMM GRANULOCYTES # BLD AUTO: 0.01 X10*3/UL (ref 0–0.7)
IMM GRANULOCYTES NFR BLD AUTO: 0.2 % (ref 0–0.9)
INHALED O2 CONCENTRATION: 21 %
KETONES UR STRIP.AUTO-MCNC: NEGATIVE MG/DL
LACTATE BLDV-SCNC: 2.1 MMOL/L (ref 0.4–2)
LEUKOCYTE ESTERASE UR QL STRIP.AUTO: NEGATIVE
LYMPHOCYTES # BLD AUTO: 1.54 X10*3/UL (ref 1.2–4.8)
LYMPHOCYTES NFR BLD AUTO: 34.8 %
MAGNESIUM SERPL-MCNC: 1.73 MG/DL (ref 1.6–2.4)
MCH RBC QN AUTO: 25.7 PG (ref 26–34)
MCHC RBC AUTO-ENTMCNC: 33.9 G/DL (ref 32–36)
MCV RBC AUTO: 76 FL (ref 80–100)
MONOCYTES # BLD AUTO: 0.5 X10*3/UL (ref 0.1–1)
MONOCYTES NFR BLD AUTO: 11.3 %
NEUTROPHILS # BLD AUTO: 2.36 X10*3/UL (ref 1.2–7.7)
NEUTROPHILS NFR BLD AUTO: 53.2 %
NITRITE UR QL STRIP.AUTO: NEGATIVE
NRBC BLD-RTO: 0 /100 WBCS (ref 0–0)
OXYHGB MFR BLDV: 60.8 % (ref 45–75)
P AXIS: 54 DEGREES
P OFFSET: 203 MS
P ONSET: 141 MS
PCO2 BLDV: 46 MM HG (ref 41–51)
PH BLDV: 7.37 PH (ref 7.33–7.43)
PH UR STRIP.AUTO: 6 [PH]
PLATELET # BLD AUTO: 120 X10*3/UL (ref 150–450)
PO2 BLDV: 39 MM HG (ref 35–45)
POTASSIUM BLDV-SCNC: 3.9 MMOL/L (ref 3.5–5.3)
POTASSIUM SERPL-SCNC: 3.7 MMOL/L (ref 3.5–5.3)
PR INTERVAL: 164 MS
PROT SERPL-MCNC: 6.3 G/DL (ref 6.4–8.2)
PROT UR STRIP.AUTO-MCNC: NEGATIVE MG/DL
Q ONSET: 223 MS
QRS COUNT: 15 BEATS
QRS DURATION: 78 MS
QT INTERVAL: 352 MS
QTC CALCULATION(BAZETT): 430 MS
QTC FREDERICIA: 403 MS
R AXIS: -29 DEGREES
RBC # BLD AUTO: 4.56 X10*6/UL (ref 4–5.2)
RBC # UR STRIP.AUTO: NEGATIVE MG/DL
SAO2 % BLDV: 64 % (ref 45–75)
SARS-COV-2 RNA RESP QL NAA+PROBE: NOT DETECTED
SODIUM BLDV-SCNC: 133 MMOL/L (ref 136–145)
SODIUM SERPL-SCNC: 137 MMOL/L (ref 136–145)
SP GR UR STRIP.AUTO: 1.01
T AXIS: 13 DEGREES
T OFFSET: 399 MS
UROBILINOGEN UR STRIP.AUTO-MCNC: NORMAL MG/DL
VENTRICULAR RATE: 90 BPM
WBC # BLD AUTO: 4.4 X10*3/UL (ref 4.4–11.3)

## 2025-03-07 PROCEDURE — 96366 THER/PROPH/DIAG IV INF ADDON: CPT

## 2025-03-07 PROCEDURE — 96367 TX/PROPH/DG ADDL SEQ IV INF: CPT

## 2025-03-07 PROCEDURE — 93005 ELECTROCARDIOGRAM TRACING: CPT

## 2025-03-07 PROCEDURE — 99285 EMERGENCY DEPT VISIT HI MDM: CPT | Mod: 25 | Performed by: EMERGENCY MEDICINE

## 2025-03-07 PROCEDURE — 96365 THER/PROPH/DIAG IV INF INIT: CPT

## 2025-03-07 PROCEDURE — 2500000004 HC RX 250 GENERAL PHARMACY W/ HCPCS (ALT 636 FOR OP/ED)

## 2025-03-07 PROCEDURE — 94640 AIRWAY INHALATION TREATMENT: CPT

## 2025-03-07 PROCEDURE — 99285 EMERGENCY DEPT VISIT HI MDM: CPT | Performed by: EMERGENCY MEDICINE

## 2025-03-07 PROCEDURE — 71046 X-RAY EXAM CHEST 2 VIEWS: CPT | Mod: FOREIGN READ | Performed by: RADIOLOGY

## 2025-03-07 PROCEDURE — 84132 ASSAY OF SERUM POTASSIUM: CPT

## 2025-03-07 PROCEDURE — 81003 URINALYSIS AUTO W/O SCOPE: CPT

## 2025-03-07 PROCEDURE — 96361 HYDRATE IV INFUSION ADD-ON: CPT

## 2025-03-07 PROCEDURE — 85025 COMPLETE CBC W/AUTO DIFF WBC: CPT

## 2025-03-07 PROCEDURE — 36415 COLL VENOUS BLD VENIPUNCTURE: CPT

## 2025-03-07 PROCEDURE — 83735 ASSAY OF MAGNESIUM: CPT

## 2025-03-07 PROCEDURE — 87636 SARSCOV2 & INF A&B AMP PRB: CPT

## 2025-03-07 PROCEDURE — 71046 X-RAY EXAM CHEST 2 VIEWS: CPT

## 2025-03-07 PROCEDURE — 2500000001 HC RX 250 WO HCPCS SELF ADMINISTERED DRUGS (ALT 637 FOR MEDICARE OP)

## 2025-03-07 RX ORDER — AZITHROMYCIN 250 MG/1
250 TABLET, FILM COATED ORAL DAILY
Qty: 4 TABLET | Refills: 0 | Status: SHIPPED | OUTPATIENT
Start: 2025-03-07 | End: 2025-03-11

## 2025-03-07 RX ORDER — MAGNESIUM SULFATE HEPTAHYDRATE 40 MG/ML
2 INJECTION, SOLUTION INTRAVENOUS ONCE
Status: DISCONTINUED | OUTPATIENT
Start: 2025-03-07 | End: 2025-03-07 | Stop reason: HOSPADM

## 2025-03-07 RX ORDER — ALBUTEROL SULFATE 90 UG/1
2 INHALANT RESPIRATORY (INHALATION) ONCE
Status: COMPLETED | OUTPATIENT
Start: 2025-03-07 | End: 2025-03-07

## 2025-03-07 RX ORDER — PREDNISONE 10 MG/1
20 TABLET ORAL DAILY
Qty: 10 TABLET | Refills: 0 | Status: SHIPPED | OUTPATIENT
Start: 2025-03-07 | End: 2025-03-12

## 2025-03-07 RX ORDER — DOXYCYCLINE 100 MG/1
100 TABLET ORAL 2 TIMES DAILY
Qty: 14 TABLET | Refills: 0 | Status: SHIPPED | OUTPATIENT
Start: 2025-03-07 | End: 2025-03-14

## 2025-03-07 RX ORDER — IPRATROPIUM BROMIDE AND ALBUTEROL SULFATE 2.5; .5 MG/3ML; MG/3ML
3 SOLUTION RESPIRATORY (INHALATION) ONCE
Status: DISCONTINUED | OUTPATIENT
Start: 2025-03-07 | End: 2025-03-07

## 2025-03-07 RX ORDER — MAGNESIUM SULFATE HEPTAHYDRATE 40 MG/ML
2 INJECTION, SOLUTION INTRAVENOUS ONCE
Status: DISCONTINUED | OUTPATIENT
Start: 2025-03-07 | End: 2025-03-07

## 2025-03-07 RX ORDER — CEFTRIAXONE 1 G/50ML
1 INJECTION, SOLUTION INTRAVENOUS ONCE
Status: COMPLETED | OUTPATIENT
Start: 2025-03-07 | End: 2025-03-07

## 2025-03-07 RX ORDER — ACETAMINOPHEN 500 MG
1000 TABLET ORAL EVERY 6 HOURS PRN
Qty: 30 TABLET | Refills: 0 | Status: SHIPPED | OUTPATIENT
Start: 2025-03-07 | End: 2025-03-17

## 2025-03-07 RX ADMIN — CEFTRIAXONE SODIUM 1 G: 1 INJECTION, SOLUTION INTRAVENOUS at 14:22

## 2025-03-07 RX ADMIN — AZITHROMYCIN 500 MG: 500 INJECTION, POWDER, LYOPHILIZED, FOR SOLUTION INTRAVENOUS at 15:05

## 2025-03-07 RX ADMIN — SODIUM CHLORIDE 1000 ML: 9 INJECTION, SOLUTION INTRAVENOUS at 12:25

## 2025-03-07 RX ADMIN — ALBUTEROL SULFATE 2 PUFF: 90 AEROSOL, METERED RESPIRATORY (INHALATION) at 14:53

## 2025-03-07 ASSESSMENT — COLUMBIA-SUICIDE SEVERITY RATING SCALE - C-SSRS
2. HAVE YOU ACTUALLY HAD ANY THOUGHTS OF KILLING YOURSELF?: NO
1. IN THE PAST MONTH, HAVE YOU WISHED YOU WERE DEAD OR WISHED YOU COULD GO TO SLEEP AND NOT WAKE UP?: NO
6. HAVE YOU EVER DONE ANYTHING, STARTED TO DO ANYTHING, OR PREPARED TO DO ANYTHING TO END YOUR LIFE?: NO

## 2025-03-07 ASSESSMENT — PAIN - FUNCTIONAL ASSESSMENT: PAIN_FUNCTIONAL_ASSESSMENT: 0-10

## 2025-03-07 ASSESSMENT — PAIN SCALES - GENERAL: PAINLEVEL_OUTOF10: 0 - NO PAIN

## 2025-03-07 NOTE — ED TRIAGE NOTES
Pt presented with c/o N/V/D for 3 days pt states that she went to urgent care was given medication that helped her symptoms however today she is feeling very weak

## 2025-03-07 NOTE — DISCHARGE INSTRUCTIONS
Please return to the emergency department, should you have any worsening symptoms, are unable to get an appointment with your primary care physician and/or specialty provider within the discussed time frame, or have any further concerns.     Please follow up with: Primary care physician as needed.  Please also schedule follow-up with pulmonology for pulmonary function test.    You may take ibuprofen or tylenol for pain. You may alternate ibuprofen and tylenol every 6 hours for better pain control.    Do not exceed 2400mg of ibuprofen or 4000mg of tylenol in a 24 hour period.

## 2025-03-07 NOTE — ED PROVIDER NOTES
History of Present Illness     History provided by: Patient  Limitations to History: None  External Records Reviewed with Brief Summary: ***    HPI:  Leni Berman is a 69 y.o. female ***    Physical Exam   Triage vitals:  T 37.9 °C (100.2 °F)  HR 85  /55  RR 16  O2 96 % None (Room air)    GEN:  A&Ox3, no acute distress, appears comfortable. Conversational and appropriate.    HEENT: Normocephalic, atraumatic. Conjunctiva pink with no redness or exudates. Hearing grossly intact. Moist mucous membranes.  CARDIO: Normal rate and regular rhythm. Normal S1, S2  without murmurs, rubs, or gallops.   PULM: Clear to auscultation bilaterally. No rales, rhonchi, or wheezes. No accessory muscle use or stridor.  GI: Soft, non-tender, non-distended. No rebound tenderness or guarding.   SKIN: Warm and dry, no rashes, lesions, petechiae, or purpura.  MSK: ROM intact in all 4 extremities without contractures or pain. No peripheral edema, contusions, or wounds.    NEURO: No focal findings identified. No confusion or gross mental status changes.  PSYCH: Appropriate mood and behavior, converses and responds appropriately during exam.    Medical Decision Making & ED Course   Medical Decision Makin y.o. female ***          ----  {Scoring Tools Utilized:64644}    Differential diagnoses considered include but are not limited to: ***     Social Determinants of Health which Significantly Impact Care: None identified {The following actions were taken to address these social determinants:93381}    EKG Independent Interpretation: Please see ED course for interpretation of EKG    Independent Result Review and Interpretation: Please see ED course and MDM for interpretation of results and interpretation    Chronic conditions affecting the patient's care:Please see ED course and MDM for interpretation of chronic conditions    The patient was discussed with the following consultants/services: None    Care Considerations: As  documented in ED course and MDM.    ED Course:  ED Course as of 03/07/25 1414   Fri Mar 07, 2025   1141 New anemia and thrombocytopenia [AD]   1141 ECG 12 lead  EKG showing normal sinus rhythm at a rate of 90 bpm, normal intervals axis, no ST elevations. Unchanged when compared to prior EKG performed on 7/24/2024 [AD]   1225 Flu A Result(!): Detected  Outside of tamiflu window, will give 4 day course of steroids    [AD]   1225 Comprehensive Metabolic Panel(!)  Slight ERMA noted, likely in the setting of diarrhea and poor p.o. intake [AD]   1412 XR chest 2 views  C/f RLL PNA, will give ceftriaxone and azithromycin  [AD]      ED Course User Index  [AD] Ashlyn Larsen DO       Disposition   {ED Disposition:28226}    Procedures   Procedures    Patient seen and discussed with ED attending physician    Ashlyn Larsen DO  Emergency Medicine   2025   1141 New anemia and thrombocytopenia [AD]   1141 ECG 12 lead  EKG showing normal sinus rhythm at a rate of 90 bpm, normal intervals axis, no ST elevations. Unchanged when compared to prior EKG performed on 7/24/2024 [AD]   1225 Flu A Result(!): Detected  Outside of tamiflu window, will give 4 day course of steroids    [AD]   1225 Comprehensive Metabolic Panel(!)  Slight ERMA noted, likely in the setting of diarrhea and poor p.o. intake [AD]   1412 XR chest 2 views  C/f RLL PNA, will give ceftriaxone and azithromycin  [AD]   1430 Urinalysis with Reflex Culture and Microscopic  Noted that she was previously treated for UTI, no obvious infection on urinalysis [AD]   1633 Recommended magnesium, steroids, and albuterol for her wheezing.  Patient declined steroids and magnesium, agreeable to albuterol.  Will plan to discharge home after antibiotics are completed.  Prescriptions for doxycycline and short course of azithromycin sent to pharmacy as well [AD]      ED Course User Index  [AD] Ashlyn Larsen DO         Diagnoses as of 03/11/25 1019   Generalized weakness   Influenza A   Pneumonia of right lower lobe due to infectious organism   Wheezing   Tobacco use       Disposition   As a result of the work-up, the patient was discharged home.  she was informed of her diagnosis and instructed to come back with any concerns or worsening of condition.  she and was agreeable to the plan as discussed above.  she was given the opportunity to ask questions.  All of the patient's questions were answered.    Procedures   Procedures    Patient seen and discussed with ED attending physician    Ashlyn Larsen DO  Emergency Medicine     Ashlyn Larsen DO  Resident  03/11/25 1019

## 2025-03-08 LAB — HOLD SPECIMEN: NORMAL

## 2025-03-12 ENCOUNTER — OFFICE VISIT (OUTPATIENT)
Dept: PULMONOLOGY | Facility: HOSPITAL | Age: 70
End: 2025-03-12
Payer: COMMERCIAL

## 2025-03-12 VITALS
HEART RATE: 82 BPM | SYSTOLIC BLOOD PRESSURE: 117 MMHG | TEMPERATURE: 97.9 F | DIASTOLIC BLOOD PRESSURE: 61 MMHG | OXYGEN SATURATION: 100 % | RESPIRATION RATE: 16 BRPM | BODY MASS INDEX: 26 KG/M2 | WEIGHT: 166.01 LBS

## 2025-03-12 DIAGNOSIS — R06.2 WHEEZING: Primary | ICD-10-CM

## 2025-03-12 DIAGNOSIS — J06.9 UPPER RESPIRATORY TRACT INFECTION, UNSPECIFIED TYPE: ICD-10-CM

## 2025-03-12 DIAGNOSIS — R91.8 GROUND GLASS OPACITY PRESENT ON IMAGING OF LUNG: ICD-10-CM

## 2025-03-12 PROCEDURE — 1159F MED LIST DOCD IN RCRD: CPT | Performed by: NURSE PRACTITIONER

## 2025-03-12 PROCEDURE — 99203 OFFICE O/P NEW LOW 30 MIN: CPT | Performed by: NURSE PRACTITIONER

## 2025-03-12 PROCEDURE — 3074F SYST BP LT 130 MM HG: CPT | Performed by: NURSE PRACTITIONER

## 2025-03-12 PROCEDURE — 1126F AMNT PAIN NOTED NONE PRSNT: CPT | Performed by: NURSE PRACTITIONER

## 2025-03-12 PROCEDURE — 99213 OFFICE O/P EST LOW 20 MIN: CPT | Mod: 25 | Performed by: NURSE PRACTITIONER

## 2025-03-12 PROCEDURE — 99406 BEHAV CHNG SMOKING 3-10 MIN: CPT | Performed by: NURSE PRACTITIONER

## 2025-03-12 PROCEDURE — 3078F DIAST BP <80 MM HG: CPT | Performed by: NURSE PRACTITIONER

## 2025-03-12 PROCEDURE — 4010F ACE/ARB THERAPY RXD/TAKEN: CPT | Performed by: NURSE PRACTITIONER

## 2025-03-12 RX ORDER — ALBUTEROL SULFATE 0.83 MG/ML
3 SOLUTION RESPIRATORY (INHALATION) ONCE
OUTPATIENT
Start: 2025-03-12 | End: 2025-03-12

## 2025-03-12 RX ORDER — ALBUTEROL SULFATE 90 UG/1
1 INHALANT RESPIRATORY (INHALATION) ONCE
OUTPATIENT
Start: 2025-03-12

## 2025-03-12 RX ORDER — ALBUTEROL SULFATE 90 UG/1
2 INHALANT RESPIRATORY (INHALATION) EVERY 4 HOURS PRN
Qty: 18 G | Refills: 5 | Status: SHIPPED | OUTPATIENT
Start: 2025-03-12

## 2025-03-12 ASSESSMENT — ENCOUNTER SYMPTOMS
BACK PAIN: 1
RHINORRHEA: 1
NUMBNESS: 0
FEVER: 0
VOMITING: 0
EYE PAIN: 0
JOINT SWELLING: 0
MYALGIAS: 0
AGITATION: 0
DIARRHEA: 0
PALPITATIONS: 0
HEADACHES: 0
FATIGUE: 1
ABDOMINAL PAIN: 0
VOICE CHANGE: 0
SINUS PRESSURE: 0
WEAKNESS: 0
NERVOUS/ANXIOUS: 1
NAUSEA: 0
ARTHRALGIAS: 1
DIZZINESS: 0

## 2025-03-12 ASSESSMENT — PAIN SCALES - GENERAL: PAINLEVEL_OUTOF10: 0-NO PAIN

## 2025-03-12 NOTE — PROGRESS NOTES
Patient: Leni Berman    [id number]  : 1955 -- AGE 69 y.o.    Provider: DAYANNA Macdonald-CNP     Location [facility]   Service Date: 3/12/2025              [facility]  New Visit Note      HISTORY OF PRESENT ILLNESS     The patient's referring provider is: Bladimir Baker MD    HISTORY OF PRESENT ILLNESS   Leni Berman is a 69 y.o. female who presents to a [facility] for an evaluation with concerns of No chief complaint on file.. I have independently interviewed and examined the patient in the office and reviewed available records.    Current History    On today's visit, the patient reports she has been not feeling well for the last several weeks. She was in the ED on  had UTI - given keflex. 3/7 presented to ED with nausea/ vomiting/ diarrhea. She was found to have influenza A. She was given doxycycline, azithromycin, and  prednisone. She was wheezing on exam in the ED.  She states she did not get the prednisone. She has been taking the azithromycin- did not start the doxycycline. Concern for UTI- thought antibiotics were for that.  She feels much better than she did - states she was dehydrated and felt better with IV fluids. She has had issues with sleeping through the night. She has a runny nose on occasion- not sure if that is better. Nasal drainage is a light yellow. She has a productive cough with yellow mucous. She is not sure if the mucous is better. She denies any wheezing. She denies any LANGE, SOB at rest, or CP.  She denies any GERD or diarrhea. She has not had BM in over a week - has not been eating much.  She denies any fever/ chills.     Previous pulmonary history: She has no history of recurrent infections, or lung disease as a child.  She had no previous lung hx, never on oxygen or inhaler therapy.     Inhalers/nebulized medications: none     Hospitalization History: She has not been hospitalized  over the last year for breathing related problem.    Sleep history: She has been told on occasion that she snores     ALLERGIES AND MEDICATIONS     ALLERGIES  Allergies   Allergen Reactions    Fish Containing Products Swelling    Penicillins Hives, Itching, Rash and Swelling     Pt said she last had penicillin over 10 years ago, and she developed a rash on her skin. She denies ever having an anaphylactic reaction, or having any airway swelling. TOLERATED CEFTRIAXONE WITH NO ADVERSE EVENTS    Shellfish Containing Products Rash    Fish Derived Swelling       MEDICATIONS  Current Outpatient Medications   Medication Sig Dispense Refill    acetaminophen (Tylenol) 325 mg tablet Take 2 tablets (650 mg) by mouth every 8 hours. 500 tablet 3    acetaminophen (Tylenol) 500 mg tablet Take 2 tablets (1,000 mg) by mouth every 6 hours if needed for mild pain (1 - 3) for up to 10 days. 30 tablet 0    ammonium lactate (Lac-Hydrin) 12 % lotion Apply topically if needed for dry skin. 396 g 0    atorvastatin (Lipitor) 40 mg tablet Take 1 tablet (40 mg) by mouth once daily. 30 tablet 11    calcium carbonate-vitamin D3 1,000 mg-20 mcg (800 unit) tablet Take 1 tablet by mouth once daily. 90 tablet 3    diclofenac sodium (Voltaren) 1 % gel Apply 4.5 inches (4 g) topically 4 times a day. 100 g 1    docusate sodium (Colace) 100 mg capsule Take 1 capsule (100 mg) by mouth every 12 hours. 60 capsule 0    doxycycline (Adoxa) 100 mg tablet Take 1 tablet (100 mg) by mouth 2 times a day for 7 days. Take with a full glass of water and do not lie down for at least 30 minutes after 14 tablet 0    enalapril (Vasotec) 5 mg tablet Take 1 tablet (5 mg) by mouth once daily. 30 tablet 11    lidocaine HCL 4 % adhesive patch,medicated Apply 1 patch topically every 24 (twenty four) hours if needed (knee pain). 30 patch 3    magnesium oxide (Mag-Ox) 400 mg (241.3 mg magnesium) tablet Take 1 tablet (400 mg) by mouth once daily.      metFORMIN (Glucophage) 500  mg tablet Take 1 tablet (500 mg) by mouth 2 times daily (morning and late afternoon). 90 tablet 3    polyethylene glycol (Glycolax, Miralax) 17 gram/dose powder Mix 17 g of powder and drink once daily. 510 g 0    predniSONE (Deltasone) 10 mg tablet Take 2 tablets (20 mg) by mouth once daily for 5 days. 10 tablet 0    Pure Comfort Safety Lancets 30 gauge misc       sennosides-docusate sodium (Zandra-Colace) 8.6-50 mg tablet Take 1 tablet by mouth once daily. 30 tablet 11     No current facility-administered medications for this visit.         PAST HISTORY     PAST MEDICAL HISTORY  - DMII   - HTN    - HLD     PAST SURGICAL HISTORY  No past surgical history on file.    IMMUNIZATION HISTORY  Immunization History   Administered Date(s) Administered    Flu vaccine, trivalent, preservative free, HIGH-DOSE, age 65y+ (Fluzone) 10/24/2024    [employer] SARS-CoV-2 Vaccination 2021    Moderna COVID-19 vaccine, bivalent, blue cap/gray label *Check age/dose* 2022    Moderna SARS-CoV-2 Vaccination 02/10/2022    Pneumococcal conjugate vaccine, 20-valent (PREVNAR 20) 2024    RSV, 60 Years And Older (AREXVY) 10/24/2024       SOCIAL HISTORY  Smokin- current. 1ppd ~ 49 pack years   Alcohol: previous alcohol issues - in AA for 3 years. Still drinks on occasion.   Illicit drugs:  none     OCCUPATIONAL/ENVIRONMENTAL HISTORY  Retired - previously worked as a home health aid     FAMILY HISTORY  Sisters x 2 - COPD    RESULTS/DATA     Pulmonary Function Test Results     None on record     Chest Radiograph     XR chest 2 views 2025  Impression  No acute process.  Signed by Romaine Luo MD      No orders to display        Chest CT Scan     24 - There is a mixed ground-glass/soft tissue opacity in the right  lower lobe most likely benign in nature but a six-month low-dose  chest CT follow-up is recommended.  2. Left subpleural atelectasis/scarring most likely  postinfectious/inflammatory. This can be assessed on  "six-month  low-dose chest CT.    CT lung screening follow up CT chest wo IV contrast 11/22/2024  Impression  1. There is minimal interval change in the right lower lobe  ground-glass/soft tissue nodular density. While this continued to  most likely be nodular in nature a continued six-month low-dose chest  CT is recommended for surveillance.  2. Estimated coronary artery calcium score is 34* which correlates  with at least 59th percentile rank as compared to matched HERNANDEZ-study  subjects([email or web address]).      Echocardiogram     None on record       Other testing/ Labs      Eosinophils Absolute (x10*3/uL)   Date Value   03/07/2025 0.00   02/02/2025 0.05   06/14/2024 0.05     No results found for: \"IGE\"    REVIEW OF SYSTEMS     REVIEW OF SYSTEMS  Review of Systems   Constitutional:  Positive for fatigue. Negative for fever.   HENT:  Positive for rhinorrhea. Negative for congestion, postnasal drip, sinus pressure and voice change.    Eyes:  Negative for pain and visual disturbance.   Cardiovascular:  Negative for chest pain, palpitations and leg swelling.   Gastrointestinal:  Negative for abdominal pain, diarrhea, nausea and vomiting.   Endocrine: Negative for cold intolerance and heat intolerance.   Musculoskeletal:  Positive for arthralgias and back pain. Negative for joint swelling and myalgias.   Skin:  Negative for rash.   Neurological:  Negative for dizziness, weakness, numbness and headaches.   Psychiatric/Behavioral:  Negative for agitation. The patient is nervous/anxious.         + depression          PHYSICAL EXAM     VITAL SIGNS: There were no vitals taken for this visit.     CURRENT WEIGHT: [unfilled]  BMI: [unfilled]  PREVIOUS WEIGHTS:  Wt Readings from Last 3 Encounters:   03/07/25 74.8 kg (165 lb)   02/20/25 74.8 kg (165 lb)   02/02/25 74.8 kg (165 lb)       Physical Exam  Vitals reviewed.   Constitutional:       General: She is not in acute distress.     Appearance: Normal " appearance. She is not ill-appearing or toxic-appearing.   HENT:      Head: Normocephalic.      Nose: No rhinorrhea.   Cardiovascular:      Rate and Rhythm: Normal rate and regular rhythm.      Heart sounds: Normal heart sounds.   Pulmonary:      Effort: Pulmonary effort is normal. No respiratory distress.      Breath sounds: Normal breath sounds. No stridor. No wheezing, rhonchi or rales.   Abdominal:      General: Abdomen is flat.   Musculoskeletal:         General: Normal range of motion.      Right lower leg: No edema.      Left lower leg: No edema.   Skin:     General: Skin is warm and dry.      Nails: There is no clubbing.   Neurological:      General: No focal deficit present.      Mental Status: She is alert and oriented to person, place, and time.   Psychiatric:         Mood and Affect: Mood normal.         Behavior: Behavior normal.         Judgment: Judgment normal.         ASSESSMENT/PLAN     Wheezing: recent ED with the flu - given doxycycline/ azithromycin in ED.   - will get baseline PFTs with next appt   - finish antibiotics from ED   - restart albuterol 2 puffs every 4-6 hours as needed     2. Pulmonary nodule: seen on lung cancer screening CT in 5/2024 - same size in 11/2024.   - follow up lung cancer screening CT already ordered by PCP -- due in 5/2025 - currently scheduled in 11/2025 [phone]     3. Smoking cessation: currently smoking 1ppd   - > 5 minutes smoking cessation counseling     Thank you for visiting the Pulmonary clinic today!   Return to clinic 3 months with breathing tests or sooner if needed   Melisa Ding CNP  My office -  [phone]- Sai is my . Michelle is my nurse [phone].   Radiology scheduling [phone]   Appointment scheduling [phone]   Pulmonary function testing - [phone]

## 2025-03-12 NOTE — PATIENT INSTRUCTIONS
Wheezing: recent ED with the flu - given doxycycline/ azithromycin in ED.   - will get baseline PFTs with next appt   - finish antibiotics from ED   - restart albuterol 2 puffs every 4-6 hours as needed     2. Pulmonary nodule: seen on lung cancer screening CT in 5/2024 - same size in 11/2024.   - follow up lung cancer screening CT already ordered by PCP -- due in 5/2025 - currently scheduled in 11/2025 [phone]     3. Smoking cessation: currently smoking 1ppd   - > 5 minutes smoking cessation counseling     Thank you for visiting the Pulmonary clinic today!   Return to clinic 3 months with breathing tests or sooner if needed   Melisa Ding CNP  My office -  [phone]- Sai is my . Michelle is my nurse [phone].   Radiology scheduling [phone]   Appointment scheduling [phone]   Pulmonary function testing - [phone]

## 2025-03-14 ENCOUNTER — APPOINTMENT (OUTPATIENT)
Dept: RESPIRATORY THERAPY | Facility: HOSPITAL | Age: 70
End: 2025-03-14
Payer: COMMERCIAL

## 2025-03-25 ENCOUNTER — APPOINTMENT (OUTPATIENT)
Dept: PHYSICAL THERAPY | Facility: HOSPITAL | Age: 70
End: 2025-03-25
Payer: COMMERCIAL

## 2025-03-31 NOTE — PROGRESS NOTES
Leni Berman is a 69 y.o. female with past medical history of HTN, diabetes who is referred by Dr. Gregg Orantes for dark stools. She reports a *** history of ***. Associated with ***.     She has a microcytic anemia with Hgb 11 (baseline 13 one month ago).     Denies abdominal pain, nausea, vomiting, heartburn, early satiety, and bloating. No unintentional weight loss.     She does not take NSAIDS regularly.     No prior EGD or colonoscopy.    Social history: -    Family history: Denies family history of colon cancer or other GI disorders or malignancy.     Past Medical History:   Diagnosis Date    Diabetes mellitus (Multi)     Hypertension      No past surgical history on file.    Current Outpatient Medications   Medication Sig Dispense Refill    acetaminophen (Tylenol) 325 mg tablet Take 2 tablets (650 mg) by mouth every 8 hours. 500 tablet 3    albuterol 90 mcg/actuation inhaler Inhale 2 puffs every 4 hours if needed for wheezing or shortness of breath (You may also use this 10-15 minutes prior to exertional activity as needed). 18 g 5    ammonium lactate (Lac-Hydrin) 12 % lotion Apply topically if needed for dry skin. 396 g 0    atorvastatin (Lipitor) 40 mg tablet Take 1 tablet (40 mg) by mouth once daily. 30 tablet 11    diclofenac sodium (Voltaren) 1 % gel Apply 4.5 inches (4 g) topically 4 times a day. 100 g 1    enalapril (Vasotec) 5 mg tablet Take 1 tablet (5 mg) by mouth once daily. 30 tablet 11    lidocaine HCL 4 % adhesive patch,medicated Apply 1 patch topically every 24 (twenty four) hours if needed (knee pain). 30 patch 3    magnesium oxide (Mag-Ox) 400 mg (241.3 mg magnesium) tablet Take 1 tablet (400 mg) by mouth once daily.      metFORMIN (Glucophage) 500 mg tablet Take 1 tablet (500 mg) by mouth 2 times daily (morning and late afternoon). 90 tablet 3    Pure Comfort Safety Lancets 30 gauge misc       sennosides-docusate sodium (Zandra-Colace) 8.6-50 mg tablet Take 1 tablet by mouth once daily. 30  tablet 11     No current facility-administered medications for this visit.       Review of Systems  Review of Systems negative except as noted in HPI.    Objective     There were no vitals taken for this visit.     Physical Exam  Constitutional:  No acute distress. Normal appearance. Not ill-appearing.  HENT:  Head normocephalic and atraumatic. Conjunctivae normal.  Cardiovascular:  Normal rate. Regular rhythm.  Pulmonary:  Pulmonary effort normal. No respiratory distress. Breath sounds clear.  Abdominal:  Abdomen is flat and soft. There is no distension. No tenderness or guarding.  Skin: Dry.  Neurological:  Alert and oriented.  Psychiatric:  Mood and affect normal.    Assessment/Plan     69 y.o. female with history of *** who presents today for clinic visit for *** history of ***.    Recommendations  1.  2. Follow up    Electronically signed by: Carolyn Vasquez CNP on 3/31/2025 at 10:05 AM

## 2025-04-01 ENCOUNTER — TREATMENT (OUTPATIENT)
Dept: PHYSICAL THERAPY | Facility: HOSPITAL | Age: 70
End: 2025-04-01
Payer: COMMERCIAL

## 2025-04-01 DIAGNOSIS — M48.061 SPINAL STENOSIS OF LUMBAR REGION WITHOUT NEUROGENIC CLAUDICATION: ICD-10-CM

## 2025-04-01 PROCEDURE — 97110 THERAPEUTIC EXERCISES: CPT | Mod: GP | Performed by: PHYSICAL THERAPIST

## 2025-04-01 NOTE — PROGRESS NOTES
"Physical Therapy Treatment    Patient Name:Leni Berman  MRN:[id number]  Today's Date:2025  Referred by: Bhavna Ferraro       Therapy Diagnosis  Problem List Items Addressed This Visit             ICD-10-CM    Spinal stenosis of lumbar region without neurogenic claudication M48.061       Assessment: Patient requires verbal and visual cuing throughout session this date with focus on LE strengthening for improving stabilization and functional mobility. She is able to tolerate to activities and less/no pain in lumbar spine. Compliance with HEP and therapy POC have helped improve this. She requires verbal and tactile cuing for instruction of exercises but is able to perform correctly.     Plan: Re-check next session     Progress with core stabilization and hip/spinal mobility as tolerated by patient     Insurance  Visit number:  8  Approved number of visits: MN  Onset Date: 2024  Certification Period:  Beginning:     3/7/2025         Endin2025  Payor: [employer] COMPLETE / Plan: [employer] DUAL COMPLETE / Product Type: *No Product type* /       Precautions/Fall Risk: none* Pacemaker no  Seizures No  Post Op Movement/Restrictions No     Subjective/Pain: Pt presents some days are better than others but toady she is achy. She states her knee caps are hurting mostly when she is up on her feet and walking. She states if she is not doing anything they don't really bother her. She states the cold rainy weather  Current Pain Level (0-10): 6    HEP Compliance: Good    Objective/Outcome Measures:  Min verbal and tactile cues/demo required for most therex    Improved lumbar ROM with therex and stretches    Treatment  Therapeutic Procedure   minutes      Therapeutic Exercise   minutes  Recumbent Stepper 8' Lvl 4  2 mins LTR on swiss ball  Hip Bridge on PB: 3\" 2 x 5   SLR x 10 R/L  Squat sit: x 10   Squat sit taps: x 20   TG Lvl 6 2 x 10 2 cords   Forward flexion (PF/DF) 2 x 10     Manual " Therapy   minutes     Neuromuscular Re-ed   minutes      Gait Training   minutes    Modalities   Vasopneumatic Device       minutes  Electrical Stimulation            minutes  Ultrasound                      minutes  Iontophoresis                     minutes  Cold Pack                        minutes  Mechanical Traction           minutes    OP EDUCATION:       Goals:  Lumbar Goals  Lumbar Spine Goals:  By discharge, patient will:  1) Demonstrate independence with home exercise program  2) Tolerate PLOA without adverse reaction  3) Increase ROM of the lumbar spine to lumbar spine in order to improve the ability to perform essential ADLs  4) Increase strength of bilateral LE to 4+ or > in order to improve the ability to perform essential ADLs  5) Improve positional tolerances of standing, sitting, walking >10 minutes/hours for essential ADLs and work duties.   6) Report decrease in pain by >= 2 points to meet MCID  7) Decrease score of Modified ANATOLY by > 6 points to meet the MCID  8) Ascend and descend 1 flight of stairs without an increase in symptoms  9) Ambulate x community distances without an increase in symptoms  10) Be able to sleep through the night without an increase in symptoms  11) Be able to perform the ADL of lifting objects up to 10 lbs for household duties without an increase or production of symptoms     Patient stated goal: stop pain

## 2025-04-07 ENCOUNTER — APPOINTMENT (OUTPATIENT)
Dept: GASTROENTEROLOGY | Facility: HOSPITAL | Age: 70
End: 2025-04-07
Payer: COMMERCIAL

## 2025-04-28 DIAGNOSIS — Z12.11 COLON CANCER SCREENING: Primary | ICD-10-CM

## 2025-04-28 RX ORDER — POLYETHYLENE GLYCOL 3350, SODIUM CHLORIDE, SODIUM BICARBONATE, POTASSIUM CHLORIDE 420; 11.2; 5.72; 1.48 G/4L; G/4L; G/4L; G/4L
4000 POWDER, FOR SOLUTION ORAL ONCE
Qty: 4000 ML | Refills: 0 | Status: SHIPPED | OUTPATIENT
Start: 2025-04-28 | End: 2025-04-28

## 2025-04-29 ENCOUNTER — TREATMENT (OUTPATIENT)
Dept: PHYSICAL THERAPY | Facility: HOSPITAL | Age: 70
End: 2025-04-29
Payer: COMMERCIAL

## 2025-04-29 DIAGNOSIS — M48.061 SPINAL STENOSIS OF LUMBAR REGION WITHOUT NEUROGENIC CLAUDICATION: ICD-10-CM

## 2025-04-29 PROCEDURE — 97110 THERAPEUTIC EXERCISES: CPT | Mod: GP | Performed by: PHYSICAL THERAPIST

## 2025-04-29 NOTE — PROGRESS NOTES
Physical Therapy Treatment/Discharge    Patient Name:Leni Berman  MRN:[id number]  Today's Date:2025  Referred by: Bhavna Ferraro  Time Calculation  Start Time: 1010  Stop Time: 1055  Time Calculation (min): 45 min    Therapy Diagnosis  Problem List Items Addressed This Visit           ICD-10-CM    Spinal stenosis of lumbar region without neurogenic claudication M48.061       Assessment: Patient re-assessed today after completing recommended POC (starting back in December). She presents today with ambulation WFL and without aid and pain free. She demonstrates improved lumbar ROM in all planes and without pain during objective measures this date and her Oswestry score improved from 25 to 14 this date as well. She demonstrates overall compliance with HEP and is agreeable to plan to continue independently with HEP following discharge this date. Patient provided with updated written HEP.     Plan: Plan for discharge to independent HEP.     Insurance  Visit number: 8 of 8  Approved number of visits: MN  Onset Date: 2024  Certification Period:  Beginning:     3/7/2025         Endin2025  Payor: [employer] DUAL COMPLETE / Plan: [employer] DUAL COMPLETE / Product Type: *No Product type* /     Precautions/Fall Risk: none* Pacemaker no  Seizures No  Post Op Movement/Restrictions No     Subjective/Pain: Pt reports no pain today just feeling fatigued. She has good and bad days which she reports is likely due to the changes in weather.   Current Pain Level (0-10): 0    HEP Compliance: Good    Objective/Outcome Measures:  Other Measures  Oswestry Disablity Index (ANATOLY): IE 25 (raw score) ( Discharge) 14 (raw score)    Observation/Posture: mildly rounded shoulder posture  Gait: WFL without aid  Palpation: TTP Thoracolumbar paraspinals R>L    Lumbar AROM:   Lumbar Flexion (%):  100% with stretching in HS   Lumbar Extension (%): WFL   Lumbar Sidebend R/L (%): 75% with stiffness  Lower Quarter  "Screen:   MMT: 4+/5 hip flexion remaining LE MMT 5/5   Sensation: intact and equal bilaterally   Special Testing  SLR: negative, mild HS tightness  PAULINE: mild hip tightness (-) for LBP  Begum's: Negative  SI Tests:Negative    Treatment  Therapeutic Procedure PT Therapeutic Procedures Time Entry  Therapeutic Exercise Time Entry: 45 minutes    Therapeutic Exercise 45 minutes  Recumbent Stepper 8' Lvl 4  LTR (knees staggered) 10\" x 10 R/L  Hip Bridge: 3\" 2 x 10   SLR 2 x 10 R/L  Squat sit taps: 3 x 10   Forward flexion (PF/DF) 2 x 10     OP EDUCATION:  Access Code: [email or web address]  URL: [email or web address]  Date: 04/29/2025  Prepared by: Ursula Hernandez    Exercises  - Supine Lower Trunk Rotation  - 1 x daily - 7 x weekly - 10 reps - 10 hold  - Supine Bridge  - 1 x daily - 7 x weekly - 2 sets - 10 reps - 3 hold  - Sidelying Hip Abduction  - 1 x daily - 7 x weekly - 2 sets - 10 reps  - Supine Active Straight Leg Raise  - 1 x daily - 7 x weekly - 2 sets - 10 reps  - Squat with Chair Touch  - 1 x daily - 7 x weekly - 2 sets - 10 reps  - Forward Bending Hip Flexion with Flat Lumbar Spine  - 1 x daily - 7 x weekly - 10 reps - 2 hold       Goals:  Lumbar Goals  Lumbar Spine Goals:  By discharge, patient will:  1) Demonstrate independence with home exercise program -MET  2) Tolerate PLOA without adverse reaction-MET  3) Increase ROM of the lumbar spine to lumbar spine in order to improve the ability to perform essential ADLs-MET  4) Increase strength of bilateral LE to 4+ or > in order to improve the ability to perform essential ADLs-MET  5) Improve positional tolerances of standing, sitting, walking >10 minutes/hours for essential ADLs and work duties. MET  6) Report decrease in pain by >= 2 points to meet MCID-MET  7) Decrease score of Modified ANATOLY by > 6 points to meet the MCID-MET  8) Ascend and descend 1 flight of stairs without an increase in symptoms _MET  9) Ambulate x community distances without an " increase in symptoms -Partially Met (must take breaks)  10) Be able to sleep through the night without an increase in symptoms-Npt Met  11) Be able to perform the ADL of lifting objects up to 10 lbs for household duties without an increase or production of symptoms-Partially Met     Patient stated goal: stop pain

## 2025-05-05 ENCOUNTER — APPOINTMENT (OUTPATIENT)
Dept: PRIMARY CARE | Facility: CLINIC | Age: 70
End: 2025-05-05
Payer: COMMERCIAL

## 2025-05-09 ENCOUNTER — TELEPHONE (OUTPATIENT)
Dept: GASTROENTEROLOGY | Facility: HOSPITAL | Age: 70
End: 2025-05-09
Payer: COMMERCIAL

## 2025-05-09 ENCOUNTER — HOSPITAL ENCOUNTER (EMERGENCY)
Facility: HOSPITAL | Age: 70
Discharge: HOME | End: 2025-05-09
Attending: EMERGENCY MEDICINE
Payer: COMMERCIAL

## 2025-05-09 VITALS
RESPIRATION RATE: 16 BRPM | HEIGHT: 67 IN | WEIGHT: 160 LBS | TEMPERATURE: 98.2 F | SYSTOLIC BLOOD PRESSURE: 138 MMHG | OXYGEN SATURATION: 98 % | BODY MASS INDEX: 25.11 KG/M2 | DIASTOLIC BLOOD PRESSURE: 85 MMHG | HEART RATE: 77 BPM

## 2025-05-09 DIAGNOSIS — R19.7 DIARRHEA, UNSPECIFIED TYPE: Primary | ICD-10-CM

## 2025-05-09 LAB
ALBUMIN SERPL BCP-MCNC: 4.4 G/DL (ref 3.4–5)
ALP SERPL-CCNC: 80 U/L (ref 33–136)
ALT SERPL W P-5'-P-CCNC: 31 U/L (ref 7–45)
ANION GAP SERPL CALC-SCNC: 16 MMOL/L (ref 10–20)
APPEARANCE UR: CLEAR
AST SERPL W P-5'-P-CCNC: 36 U/L (ref 9–39)
BASOPHILS # BLD AUTO: 0.04 X10*3/UL (ref 0–0.1)
BASOPHILS NFR BLD AUTO: 0.9 %
BILIRUB SERPL-MCNC: 0.8 MG/DL (ref 0–1.2)
BILIRUB UR STRIP.AUTO-MCNC: NEGATIVE MG/DL
BUN SERPL-MCNC: 10 MG/DL (ref 6–23)
CALCIUM SERPL-MCNC: 9.2 MG/DL (ref 8.6–10.6)
CHLORIDE SERPL-SCNC: 102 MMOL/L (ref 98–107)
CO2 SERPL-SCNC: 25 MMOL/L (ref 21–32)
COLOR UR: YELLOW
CREAT SERPL-MCNC: 0.84 MG/DL (ref 0.5–1.05)
EGFRCR SERPLBLD CKD-EPI 2021: 75 ML/MIN/1.73M*2
EOSINOPHIL # BLD AUTO: 0.02 X10*3/UL (ref 0–0.7)
EOSINOPHIL NFR BLD AUTO: 0.4 %
ERYTHROCYTE [DISTWIDTH] IN BLOOD BY AUTOMATED COUNT: 16.7 % (ref 11.5–14.5)
GLUCOSE SERPL-MCNC: 97 MG/DL (ref 74–99)
GLUCOSE UR STRIP.AUTO-MCNC: NORMAL MG/DL
HCT VFR BLD AUTO: 38.7 % (ref 36–46)
HGB BLD-MCNC: 13.4 G/DL (ref 12–16)
IMM GRANULOCYTES # BLD AUTO: 0.02 X10*3/UL (ref 0–0.7)
IMM GRANULOCYTES NFR BLD AUTO: 0.4 % (ref 0–0.9)
KETONES UR STRIP.AUTO-MCNC: ABNORMAL MG/DL
LEUKOCYTE ESTERASE UR QL STRIP.AUTO: NEGATIVE
LIPASE SERPL-CCNC: 14 U/L (ref 9–82)
LYMPHOCYTES # BLD AUTO: 1.81 X10*3/UL (ref 1.2–4.8)
LYMPHOCYTES NFR BLD AUTO: 40.3 %
MCH RBC QN AUTO: 26 PG (ref 26–34)
MCHC RBC AUTO-ENTMCNC: 34.6 G/DL (ref 32–36)
MCV RBC AUTO: 75 FL (ref 80–100)
MONOCYTES # BLD AUTO: 0.31 X10*3/UL (ref 0.1–1)
MONOCYTES NFR BLD AUTO: 6.9 %
MUCOUS THREADS #/AREA URNS AUTO: NORMAL /LPF
NEUTROPHILS # BLD AUTO: 2.29 X10*3/UL (ref 1.2–7.7)
NEUTROPHILS NFR BLD AUTO: 51.1 %
NITRITE UR QL STRIP.AUTO: NEGATIVE
NRBC BLD-RTO: 0 /100 WBCS (ref 0–0)
PH UR STRIP.AUTO: 5.5 [PH]
PLATELET # BLD AUTO: 177 X10*3/UL (ref 150–450)
POTASSIUM SERPL-SCNC: 3.6 MMOL/L (ref 3.5–5.3)
PROT SERPL-MCNC: 7.1 G/DL (ref 6.4–8.2)
PROT UR STRIP.AUTO-MCNC: ABNORMAL MG/DL
RBC # BLD AUTO: 5.16 X10*6/UL (ref 4–5.2)
RBC # UR STRIP.AUTO: NEGATIVE MG/DL
RBC #/AREA URNS AUTO: NORMAL /HPF
SODIUM SERPL-SCNC: 139 MMOL/L (ref 136–145)
SP GR UR STRIP.AUTO: 1.03
SQUAMOUS #/AREA URNS AUTO: NORMAL /HPF
UROBILINOGEN UR STRIP.AUTO-MCNC: ABNORMAL MG/DL
WBC # BLD AUTO: 4.5 X10*3/UL (ref 4.4–11.3)
WBC #/AREA URNS AUTO: NORMAL /HPF

## 2025-05-09 PROCEDURE — 81001 URINALYSIS AUTO W/SCOPE: CPT | Performed by: EMERGENCY MEDICINE

## 2025-05-09 PROCEDURE — 36415 COLL VENOUS BLD VENIPUNCTURE: CPT | Performed by: EMERGENCY MEDICINE

## 2025-05-09 PROCEDURE — 83690 ASSAY OF LIPASE: CPT | Performed by: EMERGENCY MEDICINE

## 2025-05-09 PROCEDURE — 85025 COMPLETE CBC W/AUTO DIFF WBC: CPT | Performed by: EMERGENCY MEDICINE

## 2025-05-09 PROCEDURE — 99283 EMERGENCY DEPT VISIT LOW MDM: CPT | Performed by: EMERGENCY MEDICINE

## 2025-05-09 PROCEDURE — 99284 EMERGENCY DEPT VISIT MOD MDM: CPT | Performed by: EMERGENCY MEDICINE

## 2025-05-09 PROCEDURE — 84075 ASSAY ALKALINE PHOSPHATASE: CPT | Performed by: EMERGENCY MEDICINE

## 2025-05-09 ASSESSMENT — LIFESTYLE VARIABLES
EVER HAD A DRINK FIRST THING IN THE MORNING TO STEADY YOUR NERVES TO GET RID OF A HANGOVER: NO
TOTAL SCORE: 0
HAVE YOU EVER FELT YOU SHOULD CUT DOWN ON YOUR DRINKING: NO
HAVE PEOPLE ANNOYED YOU BY CRITICIZING YOUR DRINKING: NO
EVER FELT BAD OR GUILTY ABOUT YOUR DRINKING: NO

## 2025-05-09 ASSESSMENT — PAIN - FUNCTIONAL ASSESSMENT: PAIN_FUNCTIONAL_ASSESSMENT: 0-10

## 2025-05-09 ASSESSMENT — PAIN SCALES - GENERAL: PAINLEVEL_OUTOF10: 7

## 2025-05-09 ASSESSMENT — PAIN DESCRIPTION - LOCATION: LOCATION: ABDOMEN

## 2025-05-09 NOTE — TELEPHONE ENCOUNTER
Patient states was in ER for diarrhea and is to have colonoscopy on Monday with Dr. Maxwell. She would like to be advised on if she should reschedule or not, patient states test came back okay.

## 2025-05-09 NOTE — DISCHARGE INSTRUCTIONS
You are assessed in the ED for your diarrhea.  Your labs are normal, you do not have any signs of infection.    Please follow-up with your primary care provider in 1 week to follow the course of your recovery.  Please keep your colonoscopy appointment and the appointment with your gastroenterologist, they would be able to provide you with more answers in regards to your diarrhea and constipation.    Please return to the ED if you have a fever greater than 101 °F that last more than 24 hours that does not improve with Tylenol or Motrin, more than 5 episodes of vomiting or diarrhea in the day, inability to walk, episodes of fainting, inability to eat or drink for more than a day, and if you have any other concerns.

## 2025-05-09 NOTE — ED TRIAGE NOTES
PT presents to ED via triage for chief complaint of abdominal cramping, diarrhea, and fatigue. PT states she has been having these symptoms for the past 3 days. PT denies any nausea, vomiting, chest pain, fever, chills, or bloody stools. PT has a history of HTN and diabetes. PT is Aox4 and ambulates on her own.

## 2025-05-09 NOTE — ED PROVIDER NOTES
EMERGENCY DEPARTMENT ENCOUNTER      Pt Name: Leni Berman  MRN: [id number]  Birthdate 1955  Date of evaluation: 5/9/2025  Provider: Nathalie Tay MD    CHIEF COMPLAINT       Chief Complaint   Patient presents with    Abdominal Pain    Diarrhea     HISTORY OF PRESENT ILLNESS    Leni Berman is a 70 y.o. year old female who presents to the ER for diarrhea.  The patient reports that she has had 3 days of diarrhea accompanied by feelings of being bloated.  The patient denies any blood in her diarrhea.  She has had approximately 3 episodes a day for the past 3 days, she has been eating and drinking normally.  Patient denies fevers, chills, UTI-like symptoms, chest pain, shortness of breath, abdominal pain, UTI symptoms.  Patient denies any changes in her medication.    PMH is significant for hypertension, diabetes, hyperlipidemia, seizure disorder.     PAST MEDICAL HISTORY   Medical History[1]  CURRENT MEDICATIONS       Discharge Medication List as of 5/9/2025 10:16 AM        CONTINUE these medications which have NOT CHANGED    Details   acetaminophen (Tylenol) 325 mg tablet Take 2 tablets (650 mg) by mouth every 8 hours., Starting Thu 10/24/2024, Normal      albuterol 90 mcg/actuation inhaler Inhale 2 puffs every 4 hours if needed for wheezing or shortness of breath (You may also use this 10-15 minutes prior to exertional activity as needed)., Starting Wed 3/12/2025, Normal      ammonium lactate (Lac-Hydrin) 12 % lotion Apply topically if needed for dry skin., Starting Thu 1/16/2025, Until Fri 1/16/2026 at 2359, Normal      atorvastatin (Lipitor) 40 mg tablet Take 1 tablet (40 mg) by mouth once daily., Starting Wed 5/1/2024, Normal      diclofenac sodium (Voltaren) 1 % gel Apply 4.5 inches (4 g) topically 4 times a day., Starting Thu 10/24/2024, Normal      enalapril (Vasotec) 5 mg tablet Take 1 tablet (5 mg) by mouth once daily., Starting Thu 1/16/2025, Until Fri 1/16/2026, Normal      lidocaine HCL 4 % adhesive  patch,medicated Apply 1 patch topically every 24 (twenty four) hours if needed (knee pain)., Starting Thu 10/24/2024, Normal      magnesium oxide (Mag-Ox) 400 mg (241.3 mg magnesium) tablet Take 1 tablet (400 mg) by mouth once daily., Starting Fri 9/15/2023, Historical Med      metFORMIN (Glucophage) 500 mg tablet Take 1 tablet (500 mg) by mouth 2 times daily (morning and late afternoon)., Starting Thu 10/24/2024, Normal      Pure Comfort Safety Lancets 30 gauge misc Historical Med      sennosides-docusate sodium (Zandra-Colace) 8.6-50 mg tablet Take 1 tablet by mouth once daily., Starting Thu 1/16/2025, Until Fri 1/16/2026, Normal           SURGICAL HISTORY     Surgical History[2]  ALLERGIES     Fish containing products, Penicillins, Shellfish containing products, and Fish derived  FAMILY HISTORY     Family History[3]  SOCIAL HISTORY     Social History[4]  PHYSICAL EXAM  (up to 7 for level 4, 8 or more for level 5)     ED Triage Vitals [05/09/25 0705]   Temperature Heart Rate Respirations BP   36.8 °C (98.2 °F) 77 16 138/85      Pulse Ox Temp Source Heart Rate Source Patient Position   98 % Temporal -- Sitting      BP Location FiO2 (%)     Left arm --       Physical Exam  Constitutional:       Appearance: She is obese.   HENT:      Head: Normocephalic and atraumatic.      Mouth/Throat:      Mouth: Mucous membranes are moist.   Eyes:      General: No scleral icterus.     Extraocular Movements: Extraocular movements intact.   Cardiovascular:      Rate and Rhythm: Normal rate and regular rhythm.      Heart sounds: Normal heart sounds. No murmur heard.  Pulmonary:      Effort: Pulmonary effort is normal. No respiratory distress.      Breath sounds: Normal breath sounds. No wheezing, rhonchi or rales.   Abdominal:      General: Abdomen is flat. Bowel sounds are normal. There is no distension.      Palpations: Abdomen is soft. There is no mass.      Tenderness: There is no abdominal tenderness. There is no guarding or  rebound. Negative signs include Ribeiro's sign.      Hernia: No hernia is present.   Musculoskeletal:      Right lower leg: No edema.      Left lower leg: No edema.   Skin:     General: Skin is warm and dry.      Capillary Refill: Capillary refill takes less than 2 seconds.   Neurological:      General: No focal deficit present.      Mental Status: She is alert.        DIAGNOSTIC RESULTS   LABS:  Labs Reviewed   CBC WITH AUTO DIFFERENTIAL - Abnormal       Result Value    WBC 4.5      nRBC 0.0      RBC 5.16      Hemoglobin 13.4      Hematocrit 38.7      MCV 75 (*)     MCH 26.0      MCHC 34.6      RDW 16.7 (*)     Platelets 177      Neutrophils % 51.1      Immature Granulocytes %, Automated 0.4      Lymphocytes % 40.3      Monocytes % 6.9      Eosinophils % 0.4      Basophils % 0.9      Neutrophils Absolute 2.29      Immature Granulocytes Absolute, Automated 0.02      Lymphocytes Absolute 1.81      Monocytes Absolute 0.31      Eosinophils Absolute 0.02      Basophils Absolute 0.04     URINALYSIS WITH REFLEX CULTURE AND MICROSCOPIC - Abnormal    Color, Urine Yellow      Appearance, Urine Clear      Specific Gravity, Urine 1.028      pH, Urine 5.5      Protein, Urine 20 (TRACE)      Glucose, Urine Normal      Blood, Urine NEGATIVE      Ketones, Urine TRACE (*)     Bilirubin, Urine NEGATIVE      Urobilinogen, Urine 2 (1+) (*)     Nitrite, Urine NEGATIVE      Leukocyte Esterase, Urine NEGATIVE     COMPREHENSIVE METABOLIC PANEL - Normal    Glucose 97      Sodium 139      Potassium 3.6      Chloride 102      Bicarbonate 25      Anion Gap 16      Urea Nitrogen 10      Creatinine 0.84      eGFR 75      Calcium 9.2      Albumin 4.4      Alkaline Phosphatase 80      Total Protein 7.1      AST 36      Bilirubin, Total 0.8      ALT 31     LIPASE - Normal    Lipase 14      Narrative:     Venipuncture immediately after or during the administration of Metamizole may lead to falsely low results. Testing should be performed immediately  "prior to Metamizole dosing.   URINALYSIS WITH REFLEX CULTURE AND MICROSCOPIC    Narrative:     The following orders were created for panel order Urinalysis with Reflex Culture and Microscopic.  Procedure                               Abnormality         Status                     ---------                               -----------         ------                     Urinalysis with Reflex C...[[id number]]  Abnormal            Final result               Extra Urine Gray Tube[[id number]]                                                         Please view results for these tests on the individual orders.   EXTRA URINE GRAY TUBE   URINALYSIS MICROSCOPIC WITH REFLEX CULTURE    WBC, Urine 1-5      RBC, Urine 1-2      Squamous Epithelial Cells, Urine 1-9 (SPARSE)      Mucus, Urine 1+       All other labs were within normal range or not returned as of this dictation.  Imaging  No orders to display      Procedure  Procedures  EMERGENCY DEPARTMENT COURSE/MDM:   Medical Decision Making    Vitals:    Vitals:    05/09/25 0705   BP: 138/85   BP Location: Left arm   Patient Position: Sitting   Pulse: 77   Resp: 16   Temp: 36.8 °C (98.2 °F)   TempSrc: Temporal   SpO2: 98%   Weight: 72.6 kg (160 lb)   Height: 1.702 m (5' 7\")     Leni Berman is a female 70 y.o. who presents to the ER for diarrhea. On arrival the patients vital signs were: Afebrile, regular heart rate, normotensive, regular respiration rate, normoxic on room air. History obtained from: patient.  Differential diagnoses include gastroenteritis, cholecystitis, diverticulitis, pancreatitis, incarcerated hernia.    Physical exam is significant for a soft abdomen, nontender to palpation.  Negative for Ribeiro sign, no guarding, no rebound tenderness, bowel sounds are within normal limits.  Oral mucosa is within normal limits, moist.  Skin turgor is normal.  No masses or hernias were appreciated on examination.  The heart is in regular rate and rhythm, no murmurs were " appreciated.  Lungs are clear to auscultation.  There is no edema in the lower extremities.     On independent assessment of the labs, CBC was within normal limits, no evidence of infection or acute anemia.  CMP was within normal limits, no evidence of electrolyte abnormalities, ERMA, or liver dysfunction.     Due to the patient's symptoms, history, lab results, the patient was diagnosed with non infectious diarrhea. The patient was discharged with and given return precautions. The patient was instructed to follow up with the gastroenterologist for a colonoscopy and with their PCP in one week. The patient understood and was agreeable with the plan.           ED Course as of 05/09/25 1112   Fri May 09, 2025   1016 Patient reported nonbloody diarrhea, bloating, occasional abdominal cramping for the last several days.  No fever or chills, chest pain, shortness of breath, abdominal or back pain, urinary frequency or dysuria, nausea or vomiting.  She reports eating and drinking as usual.  Scheduled to get a colonoscopy on Monday and she presented to the emergency department mainly to check if she was okay to proceed with bowel prep and colonoscopy.  Reassuring vital signs and unremarkable abdominal examination.  Patient appeared well.  Labs demonstrated no leukocytosis anemia or thrombocytopenia normal electrolytes.  I do not suspect diverticulitis, intestinal obstruction, pancreatitis, cholecystitis, or other acute intra-abdominal pathology that would warrant rotational imaging.  I advised patient to proceed with plan for bowel prep and colonoscopy on Monday.  Discharged in good condition. [MC]      ED Course User Index  [MC] Nilay Tapia MD         Diagnoses as of 05/09/25 1112   Diarrhea, unspecified type       Diagnostic testing considered but not performed: Imaging was deferred, the patient has had no abdominal pain, fevers, chills, abdominal exam was benign.  There is very low suspicion for processes such as  cholecystitis or diverticulitis.   External Records Reviewed: I reviewed recent and relevant outside records including inpatient notes, outpatient records  Prescription Drug Consideration: Patient takes metformin and Colace.     Shared decision making for disposition  Patient and/or patient´s representative was counseled regarding labs, imaging, likely diagnosis. All questions were answered. Recommendation was made   for discharge home. The patient agreed and was discharged home in stable condition with appropriate relevant educational materials. Return precautions were provided which included new or worsening abdominal pain, persistent vomiting, persistent diarrhea, black tar stools, fever of 38C (100.4) or higher, chest pain, shortness of breath, or worsening of your current symptoms..     ED Medications administered this visit:  Medications - No data to display    New Prescriptions from this visit:    Discharge Medication List as of 5/9/2025 10:16 AM          Follow-up:  Amada Foss, APRN-[facility]  [address]  [place] [id number]  [phone]    In 1 week          Final Impression:   1. Diarrhea, unspecified type          Please excuse any misspellings or unintended errors related to the Dragon speech recognition software used to dictate this note.    I reviewed the case with the attending ED physician. The attending ED physician agrees with the plan.        [1]   Past Medical History:  Diagnosis Date    Diabetes mellitus (Multi)     Hypertension    [2] History reviewed. No pertinent surgical history.  [3] No family history on file.  [4]   Social History  Tobacco Use    Smoking status: Every Day     Types: Cigarettes    Smokeless tobacco: Never   Substance Use Topics    Alcohol use: Yes    Drug use: Never        Nathalie Tay MD  Resident  05/09/25 0945

## 2025-05-09 NOTE — H&P
History Of Present Illness  Leni Berman is a 70 y.o. female presenting with colon adenomas.     Past Medical History  Medical History[1]  Surgical History  Surgical History[2]  Social History  She reports that she has been smoking cigarettes. She has never used smokeless tobacco. She reports current alcohol use. She reports that she does not use drugs.    Family History  Family History[3]     Allergies  Allergies[4]  Review of Systems     Physical Exam     Last Recorded Vitals  There were no vitals taken for this visit.    Assessment/Plan   Colon adenomas    Proceed with colonoscopy     Derian Maxwell MD       [1]   Past Medical History:  Diagnosis Date    Diabetes mellitus (Multi)     Hypertension    [2] No past surgical history on file.  [3] No family history on file.  [4]   Allergies  Allergen Reactions    Fish Containing Products Swelling    Penicillins Hives, Itching, Rash and Swelling     Pt said she last had penicillin over 10 years ago, and she developed a rash on her skin. She denies ever having an anaphylactic reaction, or having any airway swelling. TOLERATED CEFTRIAXONE WITH NO ADVERSE EVENTS    Shellfish Containing Products Rash    Fish Derived Swelling      Fall Risk

## 2025-05-12 ENCOUNTER — APPOINTMENT (OUTPATIENT)
Dept: PRIMARY CARE | Facility: CLINIC | Age: 70
End: 2025-05-12
Payer: COMMERCIAL

## 2025-05-12 ENCOUNTER — APPOINTMENT (OUTPATIENT)
Dept: GASTROENTEROLOGY | Facility: HOSPITAL | Age: 70
End: 2025-05-12
Payer: COMMERCIAL

## 2025-05-15 ENCOUNTER — OFFICE VISIT (OUTPATIENT)
Dept: UROLOGY | Facility: CLINIC | Age: 70
End: 2025-05-15
Payer: COMMERCIAL

## 2025-05-15 VITALS
HEART RATE: 84 BPM | HEIGHT: 67 IN | TEMPERATURE: 97.7 F | WEIGHT: 161.5 LBS | BODY MASS INDEX: 25.35 KG/M2 | SYSTOLIC BLOOD PRESSURE: 122 MMHG | DIASTOLIC BLOOD PRESSURE: 72 MMHG | OXYGEN SATURATION: 100 %

## 2025-05-15 DIAGNOSIS — N32.81 OAB (OVERACTIVE BLADDER): ICD-10-CM

## 2025-05-15 DIAGNOSIS — R31.29 MICROSCOPIC HEMATURIA: Primary | ICD-10-CM

## 2025-05-15 LAB
POC APPEARANCE, URINE: CLEAR
POC BILIRUBIN, URINE: ABNORMAL
POC BLOOD, URINE: NEGATIVE
POC COLOR, URINE: ABNORMAL
POC GLUCOSE, URINE: NEGATIVE MG/DL
POC KETONES, URINE: NEGATIVE MG/DL
POC LEUKOCYTES, URINE: NEGATIVE
POC NITRITE,URINE: NEGATIVE
POC PH, URINE: 5.5 PH
POC PROTEIN, URINE: ABNORMAL MG/DL
POC SPECIFIC GRAVITY, URINE: >=1.03
POC UROBILINOGEN, URINE: 1 EU/DL

## 2025-05-15 PROCEDURE — 3074F SYST BP LT 130 MM HG: CPT | Performed by: PHYSICIAN ASSISTANT

## 2025-05-15 PROCEDURE — 99214 OFFICE O/P EST MOD 30 MIN: CPT | Performed by: PHYSICIAN ASSISTANT

## 2025-05-15 PROCEDURE — 3008F BODY MASS INDEX DOCD: CPT | Performed by: PHYSICIAN ASSISTANT

## 2025-05-15 PROCEDURE — 3078F DIAST BP <80 MM HG: CPT | Performed by: PHYSICIAN ASSISTANT

## 2025-05-15 PROCEDURE — 81003 URINALYSIS AUTO W/O SCOPE: CPT | Mod: QW | Performed by: PHYSICIAN ASSISTANT

## 2025-05-15 PROCEDURE — 1126F AMNT PAIN NOTED NONE PRSNT: CPT | Performed by: PHYSICIAN ASSISTANT

## 2025-05-15 PROCEDURE — 4010F ACE/ARB THERAPY RXD/TAKEN: CPT | Performed by: PHYSICIAN ASSISTANT

## 2025-05-15 PROCEDURE — 51798 US URINE CAPACITY MEASURE: CPT | Performed by: PHYSICIAN ASSISTANT

## 2025-05-15 PROCEDURE — 1159F MED LIST DOCD IN RCRD: CPT | Performed by: PHYSICIAN ASSISTANT

## 2025-05-15 ASSESSMENT — ENCOUNTER SYMPTOMS
MUSCULOSKELETAL NEGATIVE: 1
CONSTITUTIONAL NEGATIVE: 1
NEUROLOGICAL NEGATIVE: 1
EYES NEGATIVE: 1
PSYCHIATRIC NEGATIVE: 1
HEMATOLOGIC/LYMPHATIC NEGATIVE: 1
GASTROINTESTINAL NEGATIVE: 1
CARDIOVASCULAR NEGATIVE: 1
ALLERGIC/IMMUNOLOGIC NEGATIVE: 1
RESPIRATORY NEGATIVE: 1
ENDOCRINE NEGATIVE: 1

## 2025-05-15 ASSESSMENT — PAIN SCALES - GENERAL: PAINLEVEL_OUTOF10: 0-NO PAIN

## 2025-05-15 NOTE — PROGRESS NOTES
Subjective   Patient ID: Leni Berman is a 70 y.o. female who presents for Follow-up (No complaints).  HPI  Patient is a 70 yo female with DM type 2, HTN, questionable recurrent UTIs, OAB presents for follow up.    Patient reports persistent urinary frequency, urgency about every 1-2 hours day and night.  She reports intermittently she will have dark urine and is wondering if related to her urinary tract infection.    Patient was seen in urgent care in February 20.  Urine microscopy was positive for microscopic hematuria.  Urine culture was contaminated    She admits to currently smoking 1 pack a day for the last 15 years.    UA negative for blood, nitrates and leukocytes      Urine Culture  Order: [id number] - Reflex for Order [id number]   Collected 2/20/2025 16:19       Status: Final result    Test Result Released: Yes (seen)    Specimen Information: Clean Catch/Voided; Urine   0 Result Notes  Urine Culture Growth indicates contamination with periurethral claudia. Repeat culture if clinically indicated.        Resulting Agency: [facility]          Specimen Collected: 02/20/25 16:19 Last Resulted: 02/21/25 13:58       Order Details        View Encounter        Lab and Collection Details        Routing        Result History     View All Conversations on this Encounter       ntains abnormal data Microscopic Only, Urine  Order: [id number] - Reflex for Order [id number]   Status: Final result    Test Result Released: Yes (seen)    0 Result Notes   important suggestion  Newer results are available. Click to view them now.            Component  Ref Range & Units 2 mo ago  (2/20/25) 3 mo ago  (2/2/25) 1 yr ago  (3/7/24) 1 yr ago  (10/29/23)   WBC, Urine  1-5, NONE /HPF >50 Abnormal  NONE NONE 1-5   RBC, Urine  NONE, 1-2, 3-5 /HPF >20 Abnormal  1-2 NONE 3-5   Squamous Epithelial Cells, Urine  Reference range not established. /HPF 1-9 (SPARSE) 1-9 (SPARSE) 10-25 (FEW) 10-25 (FEW)   Mucus, Urine  Reference range not established.  /LPF FEW 1+ 3+ 4+   Resulting Agency [facility]             Specimen Collected: 02/20/25 16:19 Last Resulted: 02/20/25 17:12        Lab Flowsheet        Order Details        View Encounter        Lab and Collection Details        Routing        Result History     View All Conversations on this Encounter           Review of Systems   Constitutional: Negative.    HENT: Negative.     Eyes: Negative.    Respiratory: Negative.     Cardiovascular: Negative.    Gastrointestinal: Negative.    Endocrine: Negative.    Genitourinary: Negative.    Musculoskeletal: Negative.    Skin: Negative.    Allergic/Immunologic: Negative.    Neurological: Negative.    Hematological: Negative.    Psychiatric/Behavioral: Negative.         Objective   Physical Exam  Constitutional:       General: She is not in acute distress.     Appearance: Normal appearance.   HENT:      Head: Normocephalic and atraumatic.      Nose: Nose normal.      Mouth/Throat:      Mouth: Mucous membranes are dry.   Cardiovascular:      Rate and Rhythm: Normal rate.   Pulmonary:      Effort: Pulmonary effort is normal.   Abdominal:      General: Abdomen is flat.      Palpations: Abdomen is soft.   Musculoskeletal:         General: Normal range of motion.      Cervical back: Normal range of motion and neck supple.   Skin:     General: Skin is warm and dry.   Neurological:      General: No focal deficit present.      Mental Status: She is alert and oriented to person, place, and time.   Psychiatric:         Mood and Affect: Mood normal.       Assessment/Plan     OAB  Microscopic hematuria    I discussed with patient that she is not completely emptying her bladder.  Patient is not bothered by urinary frequency and urgency.  Will discuss PTNS.    Microscopic hematuria  I discussed the definition of microscopic hematuria 3 or more hours red blood cells under microscopic evaluation.  I discussed reasons of microscopic hematuria including renal, ureteral or  bladder stones, urinary tract infection, or  malignancies.  I discussed full evaluation of microscopic hematuria including CT urogram and cystoscopy.         Tim Crystal PA-C 05/15/25 10:00 AM

## 2025-05-20 ENCOUNTER — CLINICAL SUPPORT (OUTPATIENT)
Dept: EMERGENCY MEDICINE | Facility: HOSPITAL | Age: 70
End: 2025-05-20
Payer: COMMERCIAL

## 2025-05-20 ENCOUNTER — APPOINTMENT (OUTPATIENT)
Dept: RADIOLOGY | Facility: HOSPITAL | Age: 70
End: 2025-05-20
Payer: COMMERCIAL

## 2025-05-20 ENCOUNTER — HOSPITAL ENCOUNTER (EMERGENCY)
Facility: HOSPITAL | Age: 70
Discharge: HOME | End: 2025-05-20
Attending: EMERGENCY MEDICINE
Payer: COMMERCIAL

## 2025-05-20 VITALS
HEART RATE: 92 BPM | SYSTOLIC BLOOD PRESSURE: 124 MMHG | OXYGEN SATURATION: 97 % | DIASTOLIC BLOOD PRESSURE: 73 MMHG | TEMPERATURE: 98.1 F | RESPIRATION RATE: 16 BRPM

## 2025-05-20 DIAGNOSIS — R10.9 ABDOMINAL PAIN, UNSPECIFIED ABDOMINAL LOCATION: Primary | ICD-10-CM

## 2025-05-20 LAB
ALBUMIN SERPL BCP-MCNC: 4.5 G/DL (ref 3.4–5)
ALP SERPL-CCNC: 80 U/L (ref 33–136)
ALT SERPL W P-5'-P-CCNC: 27 U/L (ref 7–45)
ANION GAP SERPL CALC-SCNC: 18 MMOL/L (ref 10–20)
APPEARANCE UR: CLEAR
AST SERPL W P-5'-P-CCNC: 27 U/L (ref 9–39)
BASOPHILS # BLD AUTO: 0.06 X10*3/UL (ref 0–0.1)
BASOPHILS NFR BLD AUTO: 1.4 %
BILIRUB SERPL-MCNC: 0.5 MG/DL (ref 0–1.2)
BILIRUB UR STRIP.AUTO-MCNC: NEGATIVE MG/DL
BUN SERPL-MCNC: 18 MG/DL (ref 6–23)
CALCIUM SERPL-MCNC: 8.9 MG/DL (ref 8.6–10.6)
CHLORIDE SERPL-SCNC: 100 MMOL/L (ref 98–107)
CO2 SERPL-SCNC: 26 MMOL/L (ref 21–32)
COLOR UR: YELLOW
CREAT SERPL-MCNC: 0.92 MG/DL (ref 0.5–1.05)
EGFRCR SERPLBLD CKD-EPI 2021: 67 ML/MIN/1.73M*2
EOSINOPHIL # BLD AUTO: 0.06 X10*3/UL (ref 0–0.7)
EOSINOPHIL NFR BLD AUTO: 1.4 %
ERYTHROCYTE [DISTWIDTH] IN BLOOD BY AUTOMATED COUNT: 17.1 % (ref 11.5–14.5)
GLUCOSE SERPL-MCNC: 148 MG/DL (ref 74–99)
GLUCOSE UR STRIP.AUTO-MCNC: NORMAL MG/DL
HCT VFR BLD AUTO: 39.3 % (ref 36–46)
HGB BLD-MCNC: 13.6 G/DL (ref 12–16)
IMM GRANULOCYTES # BLD AUTO: 0.02 X10*3/UL (ref 0–0.7)
IMM GRANULOCYTES NFR BLD AUTO: 0.5 % (ref 0–0.9)
KETONES UR STRIP.AUTO-MCNC: NEGATIVE MG/DL
LEUKOCYTE ESTERASE UR QL STRIP.AUTO: NEGATIVE
LYMPHOCYTES # BLD AUTO: 1.73 X10*3/UL (ref 1.2–4.8)
LYMPHOCYTES NFR BLD AUTO: 41.1 %
MCH RBC QN AUTO: 25.5 PG (ref 26–34)
MCHC RBC AUTO-ENTMCNC: 34.6 G/DL (ref 32–36)
MCV RBC AUTO: 74 FL (ref 80–100)
MONOCYTES # BLD AUTO: 0.33 X10*3/UL (ref 0.1–1)
MONOCYTES NFR BLD AUTO: 7.8 %
MUCOUS THREADS #/AREA URNS AUTO: NORMAL /LPF
NEUTROPHILS # BLD AUTO: 2.01 X10*3/UL (ref 1.2–7.7)
NEUTROPHILS NFR BLD AUTO: 47.8 %
NITRITE UR QL STRIP.AUTO: NEGATIVE
NRBC BLD-RTO: 0.5 /100 WBCS (ref 0–0)
PH UR STRIP.AUTO: 6 [PH]
PLATELET # BLD AUTO: 193 X10*3/UL (ref 150–450)
POTASSIUM SERPL-SCNC: 4.4 MMOL/L (ref 3.5–5.3)
PROT SERPL-MCNC: 7.3 G/DL (ref 6.4–8.2)
PROT UR STRIP.AUTO-MCNC: ABNORMAL MG/DL
RBC # BLD AUTO: 5.34 X10*6/UL (ref 4–5.2)
RBC # UR STRIP.AUTO: NEGATIVE MG/DL
RBC #/AREA URNS AUTO: NORMAL /HPF
SODIUM SERPL-SCNC: 140 MMOL/L (ref 136–145)
SP GR UR STRIP.AUTO: 1.02
SQUAMOUS #/AREA URNS AUTO: NORMAL /HPF
UROBILINOGEN UR STRIP.AUTO-MCNC: ABNORMAL MG/DL
WBC # BLD AUTO: 4.2 X10*3/UL (ref 4.4–11.3)
WBC #/AREA URNS AUTO: NORMAL /HPF

## 2025-05-20 PROCEDURE — 2500000001 HC RX 250 WO HCPCS SELF ADMINISTERED DRUGS (ALT 637 FOR MEDICARE OP)

## 2025-05-20 PROCEDURE — 85025 COMPLETE CBC W/AUTO DIFF WBC: CPT | Performed by: EMERGENCY MEDICINE

## 2025-05-20 PROCEDURE — 81003 URINALYSIS AUTO W/O SCOPE: CPT

## 2025-05-20 PROCEDURE — 99285 EMERGENCY DEPT VISIT HI MDM: CPT | Mod: 25 | Performed by: EMERGENCY MEDICINE

## 2025-05-20 PROCEDURE — 74177 CT ABD & PELVIS W/CONTRAST: CPT

## 2025-05-20 PROCEDURE — 2500000002 HC RX 250 W HCPCS SELF ADMINISTERED DRUGS (ALT 637 FOR MEDICARE OP, ALT 636 FOR OP/ED)

## 2025-05-20 PROCEDURE — 96374 THER/PROPH/DIAG INJ IV PUSH: CPT | Mod: 59

## 2025-05-20 PROCEDURE — 2500000005 HC RX 250 GENERAL PHARMACY W/O HCPCS

## 2025-05-20 PROCEDURE — 80053 COMPREHEN METABOLIC PANEL: CPT | Performed by: EMERGENCY MEDICINE

## 2025-05-20 PROCEDURE — 36415 COLL VENOUS BLD VENIPUNCTURE: CPT | Performed by: EMERGENCY MEDICINE

## 2025-05-20 PROCEDURE — 93005 ELECTROCARDIOGRAM TRACING: CPT

## 2025-05-20 PROCEDURE — 2550000001 HC RX 255 CONTRASTS: Performed by: EMERGENCY MEDICINE

## 2025-05-20 PROCEDURE — 2500000004 HC RX 250 GENERAL PHARMACY W/ HCPCS (ALT 636 FOR OP/ED): Mod: JZ

## 2025-05-20 RX ORDER — ALUMINUM HYDROXIDE, MAGNESIUM HYDROXIDE, AND SIMETHICONE 1200; 120; 1200 MG/30ML; MG/30ML; MG/30ML
10 SUSPENSION ORAL ONCE
Status: COMPLETED | OUTPATIENT
Start: 2025-05-20 | End: 2025-05-20

## 2025-05-20 RX ORDER — LIDOCAINE HYDROCHLORIDE 20 MG/ML
1.25 SOLUTION OROPHARYNGEAL ONCE
Status: COMPLETED | OUTPATIENT
Start: 2025-05-20 | End: 2025-05-20

## 2025-05-20 RX ORDER — DIPHENHYDRAMINE HCL 12.5MG/5ML
25 LIQUID (ML) ORAL ONCE
Status: COMPLETED | OUTPATIENT
Start: 2025-05-20 | End: 2025-05-20

## 2025-05-20 RX ORDER — ONDANSETRON HYDROCHLORIDE 2 MG/ML
4 INJECTION, SOLUTION INTRAVENOUS ONCE
Status: COMPLETED | OUTPATIENT
Start: 2025-05-20 | End: 2025-05-20

## 2025-05-20 RX ADMIN — ALUMINUM HYDROXIDE, MAGNESIUM HYDROXIDE, AND SIMETHICONE 10 ML: 200; 200; 20 SUSPENSION ORAL at 13:01

## 2025-05-20 RX ADMIN — LIDOCAINE HYDROCHLORIDE 1.25 ML: 20 SOLUTION ORAL at 13:00

## 2025-05-20 RX ADMIN — DIPHENHYDRAMINE HYDROCHLORIDE 25 MG: 25 SOLUTION ORAL at 13:01

## 2025-05-20 RX ADMIN — IOHEXOL 75 ML: 350 INJECTION, SOLUTION INTRAVENOUS at 15:00

## 2025-05-20 RX ADMIN — ONDANSETRON 4 MG: 2 INJECTION INTRAMUSCULAR; INTRAVENOUS at 13:01

## 2025-05-20 NOTE — ED PROVIDER NOTES
History of Present Illness     History provided by: Patient  Limitations to History: None Identified  External Records Reviewed with Brief Summary: Previous ED visits/recent PCP notes for PMH     HPI:  Leni Berman is a 70 y.o. female with history of hypertension diabetes hyperlipidemia who presents for evaluation of abdominal discomfort and nausea.  She has had multiple ED visits over the last few months for diarrhea or constipation.  She states that she did have a bowel movement this morning.  No blood.  No dysuria no hematuria.  She feels nauseous without vomiting.  She has not had significant weight loss or gain.  No falls or traumas.    Physical Exam   Triage vitals:  T 36.8 °C (98.2 °F)  HR (!) 101  /70  RR 18  O2 100 % None (Room air)    General: Awake, alert, in no acute distress  Eyes: Gaze conjugate.  No scleral icterus or injection  HENT: Normo-cephalic, atraumatic. No stridor  CV: RRR. Radial/PT pulses 2+ bilaterally  Resp: Breathing non-labored, speaking in full sentences.  Clear to auscultation bilaterally  GI: Soft, non-distended, mildly tender.. No rebound or guarding.  : Deferred  MSK/Extremities: No gross bony deformities. Moving all extremities  Skin: Warm. Appropriate color  Neuro: Alert. Oriented. Face symmetric. Speech is fluent.  Gross strength and sensation intact in b/l UE and LEs  Psych: Appropriate mood and affect      Medical Decision Making & ED Course   Medical Decision Makin y.o. female who presents for evaluation of abdominal pain and nausea.  She is mildly tachycardic though otherwise vitally stable and comfortable on exam.  She received basic lab workup that showed new leukopenia and nucleated red blood cells no anemia no thrombocytopenia.  No neutrophil predominance.  Her CMP showed no renal hepatic or electrolyte abnormalities.  She had no UTI.  She did have some mild abdominal discomfort with the tachycardia multiple visits did obtain CT imaging to assess for  underlying biliary pancreatic pathology renal pathology.  Less likely renal stone given no urinary symptoms less could be intra-abdominal mass or adenopathy leading to obstructive pathology.  Given passing gas and bowel movements no concerns for SBO.  No concerns for ileus.  No recent abdominal surgeries.  Patient on chart review looks like she was scheduled for cystoscopy she did not make appointment for and there was reports of colonoscopy in the last few months though I cannot find any reports that this was completed.  Patient was signed out to oncoming ED care team pending abdominal imaging.  As long as patient is tolerating p.o. and symptoms are managed with supportive care patient likely appropriate for home-going with outpatient follow-up.  ----     Social Determinants of Health which Significantly Impact Care: Difficulty obtaining outpatient follow-up The following actions were taken to address these social determinants: Patient given referral to GI    EKG Independent Interpretation: EKG interpreted by myself. Please see ED Course and [facility] for full interpretation.    Independent Result Review and Interpretation: Results were independently reviewed and interpreted by myself. Please see ED course and [facility] for full interpretation.    Chronic conditions affecting the patient's care: As documented in the [facility]    Care Considerations: As per [facility]    ED Course:  ED Course as of 05/20/25 1432   Tue May 20, 2025   1432 EKG sinus rhythm with rate of 89 intervals within normal limits baseline artifact in inferior leads and more prominent T waves in anterior leads.  Regular axis.  No acute ischemia. [SC]      ED Course User Index  [SC] Beckie Arndt DO     Disposition   Signout pending imaging    Procedures   Procedures    Patient seen and discussed with ED attending physician.    Beckie Arndt DO  PGY-3 Emergency Medicine     Beckie Arndt DO  Resident  05/20/25 1432

## 2025-05-20 NOTE — PROGRESS NOTES
I assumed care of this patient at 3 PM.    Please see initial provider for full HPI.  Briefly is a 7-year-old female with history of hypertension, diabetes and hyperlipidemia presenting due to concerns of abdominal pain but has had normal bowel movements.  CT results are pending at the end of my shift.  Lab work is unremarkable at this time.  Urinalysis does not show any evidence of UTI.  CT of the abdomen pelvis was negative for any acute abnormality.  She was told to follow-up with her primary physician, keep a food diary and was discharged in hemodynamically stable condition.  Care was overseen by attending physician agrees with the plan and disposition.    Mary Carmen Mireles MD  Emergency Medicine - PGY3  [facility]  [address] [facility] [address]

## 2025-05-21 ENCOUNTER — HOSPITAL ENCOUNTER (OUTPATIENT)
Dept: RADIOLOGY | Facility: HOSPITAL | Age: 70
Discharge: HOME | End: 2025-05-21
Payer: COMMERCIAL

## 2025-05-21 DIAGNOSIS — R91.8 GROUND GLASS OPACITY PRESENT ON IMAGING OF LUNG: ICD-10-CM

## 2025-05-21 DIAGNOSIS — Z87.891 PERSONAL HISTORY OF NICOTINE DEPENDENCE: ICD-10-CM

## 2025-05-21 LAB
ATRIAL RATE: 89 BPM
HOLD SPECIMEN: NORMAL
P AXIS: 52 DEGREES
P OFFSET: 206 MS
P ONSET: 145 MS
PR INTERVAL: 154 MS
Q ONSET: 222 MS
QRS COUNT: 15 BEATS
QRS DURATION: 76 MS
QT INTERVAL: 394 MS
QTC CALCULATION(BAZETT): 479 MS
QTC FREDERICIA: 449 MS
R AXIS: -27 DEGREES
T AXIS: -39 DEGREES
T OFFSET: 419 MS
VENTRICULAR RATE: 89 BPM

## 2025-05-21 PROCEDURE — 76380 CAT SCAN FOLLOW-UP STUDY: CPT | Performed by: RADIOLOGY

## 2025-05-21 PROCEDURE — 71250 CT THORAX DX C-: CPT

## 2025-05-27 DIAGNOSIS — Z87.891 HISTORY OF SMOKING 25-50 PACK YEARS: Primary | ICD-10-CM

## 2025-05-27 DIAGNOSIS — R91.1 PULMONARY NODULE: ICD-10-CM

## 2025-06-17 ENCOUNTER — PROCEDURE VISIT (OUTPATIENT)
Dept: UROLOGY | Facility: HOSPITAL | Age: 70
End: 2025-06-17
Payer: COMMERCIAL

## 2025-06-17 VITALS
SYSTOLIC BLOOD PRESSURE: 106 MMHG | HEART RATE: 79 BPM | WEIGHT: 160.27 LBS | OXYGEN SATURATION: 100 % | DIASTOLIC BLOOD PRESSURE: 56 MMHG | BODY MASS INDEX: 25.1 KG/M2 | RESPIRATION RATE: 18 BRPM

## 2025-06-17 DIAGNOSIS — R31.29 MICROSCOPIC HEMATURIA: ICD-10-CM

## 2025-06-17 DIAGNOSIS — N32.81 OAB (OVERACTIVE BLADDER): ICD-10-CM

## 2025-06-17 PROCEDURE — 52000 CYSTOURETHROSCOPY: CPT | Performed by: STUDENT IN AN ORGANIZED HEALTH CARE EDUCATION/TRAINING PROGRAM

## 2025-06-17 ASSESSMENT — PAIN SCALES - GENERAL: PAINLEVEL_OUTOF10: 7

## 2025-06-17 NOTE — PROGRESS NOTES
Subjective    Leni Berman is a 70 y.o. female with microscopic hematuria who presents for cystoscopy. Referred by Tim Crystal PA-C.    Patient was seen in urgent care in February 20.  Urine microscopy was positive for microscopic hematuria.  She had 20 RBC/HPF.  Urine culture was contaminated.    She has LUTS managed with [facility].    She did not have a CT Urogram.  She did have a CT A/P with IV contrast which should be sufficient.  I do not see any upper tract causes of hematuria.     She admits to currently smoking 1 pack a day for the last 15 years.     UA negative for blood, nitrates and leukocytes      PMHx: DM type 2, HTN, questionable recurrent UTIs, OAB       PSHx:  has no past surgical history on file.      Social:   Tobacco Use: High Risk (5/9/2025)    Patient History     Smoking Tobacco Use: Every Day     Smokeless Tobacco Use: Never     Passive Exposure: Not on file         Family: Cancer-related family history is not on file.          Review of Systems    All systems were reviewed. Anything negative was noted in the HPI.    Objective   Physical Exam  Gen: No acute distress      Psych: Alert and oriented x3      Neuro:  Normal ROM     Resp: Nonlabored respirations      CV: Regular rate and rhythm      Abd: S, NT, ND.     : Deferred     Skin: Warm, dry and intact without rashes      Lymphatics: No peripheral edema     Patient ID: Leni Berman is a 70 y.o. female.    Cystoscopy    Date/Time: 6/17/2025 9:27 AM    Performed by: Chris Britton MD MPH  Authorized by: Tim Crystal PA-C    Procedure - Bladder Cystoscopy:     Procedure details: cystoscopy    Post-procedure:     Patient tolerance: Patient tolerated the procedure well with no immediate complications      Comments:      Procedure: Cystoscopy    Indication: Microscopice hematuria    Complications: None    Time Out: Performed with 2 patient identifiers    EBL: None    Abx: None    Procedure:  The patient was brought to the procedure  room.  Informed consent was obtained.  The genitals were sterilely prepped and draped once they were placed on the procedure table.    Bladder without papillary tumor, stones, foreign body or diverticula.  No source of hematuria or recurrent infections            Assessment & Plan  Microscopic hematuria    Orders:    Cystoscopy; Future    Cystoscopy    Follow Up In Urology; Future    OAB (overactive bladder)    Orders:    Cystoscopy      Microscopic hematuria evaluation negative per CT A/P with IV contrast and cystoscopy.  Would recommend follow-up with [facility] for urologic issues and monitoring.                             Scribe Attestation  By signing my name below, IMelissa Scribe   attest that this documentation has been prepared under the direction and in the presence of Chris Britton MD MPH

## 2025-06-18 ENCOUNTER — HOSPITAL ENCOUNTER (OUTPATIENT)
Dept: RESPIRATORY THERAPY | Facility: HOSPITAL | Age: 70
Discharge: HOME | End: 2025-06-18
Payer: COMMERCIAL

## 2025-06-18 ENCOUNTER — APPOINTMENT (OUTPATIENT)
Dept: PULMONOLOGY | Facility: HOSPITAL | Age: 70
End: 2025-06-18
Payer: COMMERCIAL

## 2025-06-18 DIAGNOSIS — R06.2 WHEEZING: ICD-10-CM

## 2025-06-18 PROCEDURE — 2500000001 HC RX 250 WO HCPCS SELF ADMINISTERED DRUGS (ALT 637 FOR MEDICARE OP): Performed by: NURSE PRACTITIONER

## 2025-06-18 PROCEDURE — 94618 PULMONARY STRESS TESTING: CPT | Performed by: STUDENT IN AN ORGANIZED HEALTH CARE EDUCATION/TRAINING PROGRAM

## 2025-06-18 PROCEDURE — 94060 EVALUATION OF WHEEZING: CPT | Performed by: STUDENT IN AN ORGANIZED HEALTH CARE EDUCATION/TRAINING PROGRAM

## 2025-06-18 PROCEDURE — 94729 DIFFUSING CAPACITY: CPT | Performed by: STUDENT IN AN ORGANIZED HEALTH CARE EDUCATION/TRAINING PROGRAM

## 2025-06-18 PROCEDURE — 94726 PLETHYSMOGRAPHY LUNG VOLUMES: CPT | Performed by: STUDENT IN AN ORGANIZED HEALTH CARE EDUCATION/TRAINING PROGRAM

## 2025-06-18 PROCEDURE — 94726 PLETHYSMOGRAPHY LUNG VOLUMES: CPT

## 2025-06-18 RX ORDER — ALBUTEROL SULFATE 0.83 MG/ML
3 SOLUTION RESPIRATORY (INHALATION) ONCE
Status: COMPLETED | OUTPATIENT
Start: 2025-06-18 | End: 2025-06-18

## 2025-06-18 RX ORDER — ALBUTEROL SULFATE 90 UG/1
1 INHALANT RESPIRATORY (INHALATION) ONCE
Status: COMPLETED | OUTPATIENT
Start: 2025-06-18 | End: 2025-06-18

## 2025-06-18 RX ADMIN — ALBUTEROL SULFATE 4 PUFF: 90 AEROSOL, METERED RESPIRATORY (INHALATION) at 09:20

## 2025-06-19 ENCOUNTER — APPOINTMENT (OUTPATIENT)
Facility: HOSPITAL | Age: 70
End: 2025-06-19
Payer: COMMERCIAL

## 2025-06-19 LAB
MGC ASCENT PFT - FEV1 - POST: 1.41
MGC ASCENT PFT - FEV1 - PRE: 1.38
MGC ASCENT PFT - FEV1 - PREDICTED: 2.32
MGC ASCENT PFT - FVC - POST: 1.99
MGC ASCENT PFT - FVC - PRE: 1.76
MGC ASCENT PFT - FVC - PREDICTED: 3.01

## 2025-06-23 NOTE — PROGRESS NOTES
"  Leni Berman is a 70 y.o. female with past medical history of HTN, type 2 diabetes, and tobacco use who is referred by Dr. Gregg Orantes for dark stools. She states she does not have dark stools but was recently seen in ER for abdominal pain and nausea that she had been having \"for a while\". Does not occur daily. Feels she has the abdominal discomfort when constipated. Having BM every other day or every 2 days with stool softener as needed. Stools are not dark and are not bloody. No heartburn or reflux.    Labs showed no anemia. CT imaging showed no acute pathology. Stomach and bowel appeared normal. Colonoscopy 5/2024 had fair prep. There were 15 polyps removed. Recommended repeat in 1 year with two day extended bowel prep.     Family history: Denies family history of colon polyps, colon cancer or other GI disorders or malignancy.       Current Medications[1]      Review of Systems  Review of Systems negative except as noted in HPI.    Objective     /68   Pulse 80   Temp 36.4 °C (97.6 °F)   Wt 73.2 kg (161 lb 4.8 oz)   SpO2 97%   BMI 25.26 kg/m²      Physical Exam  Constitutional:  No acute distress. Normal appearance. Not ill-appearing.  HENT:  Head normocephalic and atraumatic. Conjunctivae normal.  Cardiovascular:  Normal rate. Regular rhythm.  Pulmonary:  Pulmonary effort normal. No respiratory distress. Breath sounds clear.  Abdominal:  Abdomen is soft. There is no distension. No tenderness or guarding.  Skin: Dry.  Neurological:  Alert and oriented.  Psychiatric:  Mood and affect normal.    Assessment/Plan     70 y.o. female with history of HTN, type 2 diabetes, and tobacco use who presents today for clinic visit for abdominal discomfort and constipation. Symptoms have overall improved some with Colace prn. We will increase this to daily. She is also due for repeat colonoscopy due to her history of colon polyps (15 polyps removed last year).    Recommendations  Increase Colace to " daily.  Schedule repeat colonoscopy with Dr. Negar Spears with 2 day bowel prep with Golytely.  Recommend genetics eval given large amount of colonic polyps.   Follow up as needed.    Electronically signed by: Carolyn Vasquez CNP on 7/7/2025 at 10:15 AM             [1]   Current Outpatient Medications   Medication Sig Dispense Refill    acetaminophen (Tylenol) 325 mg tablet Take 2 tablets (650 mg) by mouth every 8 hours. 500 tablet 3    albuterol 90 mcg/actuation inhaler Inhale 2 puffs every 4 hours if needed for wheezing or shortness of breath (You may also use this 10-15 minutes prior to exertional activity as needed). 18 g 5    atorvastatin (Lipitor) 40 mg tablet Take 1 tablet (40 mg) by mouth once daily. 30 tablet 11    calcium carbonate-vitamin D3 1,000 mg-20 mcg (800 unit) tablet Take 1 tablet by mouth once daily.      docusate sodium (Colace) 100 mg capsule Take 1 capsule (100 mg) by mouth every 12 hours. (Patient taking differently: Take 1 capsule (100 mg) by mouth if needed.)      enalapril (Vasotec) 5 mg tablet Take 1 tablet (5 mg) by mouth once daily. 30 tablet 11    magnesium oxide (Mag-Ox) 400 mg (241.3 mg magnesium) tablet Take 1 tablet by mouth once daily.      metFORMIN (Glucophage) 500 mg tablet Take 1 tablet (500 mg) by mouth once daily with breakfast. 90 tablet 3    sennosides-docusate sodium (Zandra-Colace) 8.6-50 mg tablet Take 1 tablet by mouth once daily. 30 tablet 11    ammonium lactate (Lac-Hydrin) 12 % lotion Apply topically if needed for dry skin. (Patient not taking: Reported on 7/7/2025) 396 g 0    Dexcom G7  misc Use as instructed (Patient not taking: Reported on 7/7/2025) 1 each 0    Dexcom G7 Sensor device Use as directed. (Patient not taking: Reported on 7/7/2025) 1 each 0    lidocaine HCL 4 % adhesive patch,medicated Apply 1 patch topically every 24 (twenty four) hours if needed (knee pain). (Patient not taking: Reported on 5/15/2025) 30 patch 3    Pure Comfort Safety Lancets 30  gauge misc  (Patient not taking: Reported on 7/7/2025)       No current facility-administered medications for this visit.

## 2025-06-25 ENCOUNTER — OFFICE VISIT (OUTPATIENT)
Dept: PULMONOLOGY | Facility: HOSPITAL | Age: 70
End: 2025-06-25
Payer: COMMERCIAL

## 2025-06-25 VITALS
WEIGHT: 158.9 LBS | TEMPERATURE: 96.4 F | RESPIRATION RATE: 14 BRPM | HEART RATE: 76 BPM | DIASTOLIC BLOOD PRESSURE: 57 MMHG | BODY MASS INDEX: 24.89 KG/M2 | SYSTOLIC BLOOD PRESSURE: 105 MMHG | OXYGEN SATURATION: 100 %

## 2025-06-25 DIAGNOSIS — R06.2 WHEEZING: Primary | ICD-10-CM

## 2025-06-25 DIAGNOSIS — J06.9 UPPER RESPIRATORY TRACT INFECTION, UNSPECIFIED TYPE: ICD-10-CM

## 2025-06-25 DIAGNOSIS — F17.210 CIGARETTE NICOTINE DEPENDENCE WITHOUT COMPLICATION: ICD-10-CM

## 2025-06-25 DIAGNOSIS — R91.8 GROUND GLASS OPACITY PRESENT ON IMAGING OF LUNG: ICD-10-CM

## 2025-06-25 PROCEDURE — 1125F AMNT PAIN NOTED PAIN PRSNT: CPT | Performed by: NURSE PRACTITIONER

## 2025-06-25 PROCEDURE — 3074F SYST BP LT 130 MM HG: CPT | Performed by: NURSE PRACTITIONER

## 2025-06-25 PROCEDURE — 99214 OFFICE O/P EST MOD 30 MIN: CPT | Performed by: NURSE PRACTITIONER

## 2025-06-25 PROCEDURE — 4010F ACE/ARB THERAPY RXD/TAKEN: CPT | Performed by: NURSE PRACTITIONER

## 2025-06-25 PROCEDURE — 3078F DIAST BP <80 MM HG: CPT | Performed by: NURSE PRACTITIONER

## 2025-06-25 ASSESSMENT — ENCOUNTER SYMPTOMS
FEVER: 0
DIARRHEA: 0
MYALGIAS: 0
DIZZINESS: 0
VOICE CHANGE: 0
WEAKNESS: 0
SINUS PRESSURE: 0
NERVOUS/ANXIOUS: 0
PALPITATIONS: 0
FATIGUE: 0
JOINT SWELLING: 0
ARTHRALGIAS: 0
NUMBNESS: 0
AGITATION: 0
ABDOMINAL PAIN: 0
VOMITING: 0
BACK PAIN: 0
EYE PAIN: 0
RHINORRHEA: 0
NAUSEA: 0
HEADACHES: 0

## 2025-06-25 ASSESSMENT — PAIN SCALES - GENERAL: PAINLEVEL_OUTOF10: 8

## 2025-06-25 NOTE — PROGRESS NOTES
Patient: Leni Berman    [id number]  : 1955 -- AGE 70 y.o.    Provider: DAYANNA Macdonald-CNP     Location [facility]   Service Date: 2025              [facility]  Follow up Visit Note      HISTORY OF PRESENT ILLNESS     The patient's referring provider is: No ref. provider found    HISTORY OF PRESENT ILLNESS   Leni Berman is a 70 y.o. female who presents to a [facility] for an evaluation with concerns of No chief complaint on file.. I have independently interviewed and examined the patient in the office and reviewed available records.    Current History    Since last visit   She was in the ED  for diarrhea - non infectious and sent home. She was in ED on  for abdominal discomfort/ nausea. She was referred to GI and has an appt upcoming.  She states the heat has been bothering her breathing. She states she had her AC fixed yesterday. She has a PRN albuterol inhalers - has not needed it. She states she coughs - can be both dry. She denies any wheezing, LANGE, SOB at rest, allergies, GERD, or chest pain. She is having issues with both her knees - having throbbing pain today.      3/12/25: On today's visit, the patient reports she has been not feeling well for the last several weeks. She was in the ED on  had UTI - given keflex. 3/7 presented to ED with nausea/ vomiting/ diarrhea. She was found to have influenza A. She was given doxycycline, azithromycin, and  prednisone. She was wheezing on exam in the ED.  She states she did not get the prednisone. She has been taking the azithromycin- did not start the doxycycline. Concern for UTI- thought antibiotics were for that.  She feels much better than she did - states she was dehydrated and felt better with IV fluids. She has had issues with sleeping through the night. She has a runny nose on occasion- not sure if that is better. Nasal drainage is a light  yellow. She has a productive cough with yellow mucous. She is not sure if the mucous is better. She denies any wheezing. She denies any LANGE, SOB at rest, or CP.  She denies any GERD or diarrhea. She has not had BM in over a week - has not been eating much.  She denies any fever/ chills.     Previous pulmonary history: She has no history of recurrent infections, or lung disease as a child.  She had no previous lung hx, never on oxygen or inhaler therapy.     Inhalers/nebulized medications: none     Hospitalization History: She has not been hospitalized over the last year for breathing related problem.    Sleep history: She has been told on occasion that she snores     ALLERGIES AND MEDICATIONS     ALLERGIES  Allergies   Allergen Reactions    Fish Containing Products Swelling    Penicillins Hives, Itching, Rash and Swelling     Pt said she last had penicillin over 10 years ago, and she developed a rash on her skin. She denies ever having an anaphylactic reaction, or having any airway swelling. TOLERATED CEFTRIAXONE WITH NO ADVERSE EVENTS    Shellfish Containing Products Rash    Fish Derived Swelling       MEDICATIONS  Current Outpatient Medications   Medication Sig Dispense Refill    acetaminophen (Tylenol) 325 mg tablet Take 2 tablets (650 mg) by mouth every 8 hours. 500 tablet 3    albuterol 90 mcg/actuation inhaler Inhale 2 puffs every 4 hours if needed for wheezing or shortness of breath (You may also use this 10-15 minutes prior to exertional activity as needed). 18 g 5    ammonium lactate (Lac-Hydrin) 12 % lotion Apply topically if needed for dry skin. 396 g 0    atorvastatin (Lipitor) 40 mg tablet Take 1 tablet (40 mg) by mouth once daily. 30 tablet 11    diclofenac sodium (Voltaren) 1 % gel Apply 4.5 inches (4 g) topically 4 times a day. (Patient not taking: Reported on 6/17/2025) 100 g 1    enalapril (Vasotec) 5 mg tablet Take 1 tablet (5 mg) by mouth once daily. 30 tablet 11    lidocaine HCL 4 % adhesive  patch,medicated Apply 1 patch topically every 24 (twenty four) hours if needed (knee pain). (Patient not taking: Reported on 2025) 30 patch 3    magnesium oxide (Mag-Ox) 400 mg (241.3 mg magnesium) tablet Take 1 tablet by mouth once daily.      metFORMIN (Glucophage) 500 mg tablet Take 1 tablet (500 mg) by mouth 2 times daily (morning and late afternoon). 90 tablet 3    Pure Comfort Safety Lancets 30 gauge misc  (Patient not taking: Reported on 2025)      sennosides-docusate sodium (Zandra-Colace) 8.6-50 mg tablet Take 1 tablet by mouth once daily. 30 tablet 11     No current facility-administered medications for this visit.         PAST HISTORY     PAST MEDICAL HISTORY  - DMII   - HTN    - HLD     PAST SURGICAL HISTORY  No past surgical history on file.    IMMUNIZATION HISTORY  Immunization History   Administered Date(s) Administered    Flu vaccine, trivalent, preservative free, HIGH-DOSE, age 65y+ (Fluzone) 10/24/2024    Motomotives SARS-CoV-2 Vaccination 2021    Moderna COVID-19 vaccine, bivalent, blue cap/gray label *Check age/dose* 2022    Moderna SARS-CoV-2 Vaccination 02/10/2022    Pneumococcal conjugate vaccine, 20-valent (PREVNAR 20) 2024    RSV, 60 Years And Older (AREXVY) 10/24/2024       SOCIAL HISTORY  Smokin- current. 1ppd ~ 49 pack years   Alcohol: previous alcohol issues - in AA for 3 years. Still drinks on occasion.   Illicit drugs:  none     OCCUPATIONAL/ENVIRONMENTAL HISTORY  Retired - previously worked as a home health aid     FAMILY HISTORY  Sisters x 2 - COPD    RESULTS/DATA     Pulmonary Function Test Results     25 - FEV1/FVC 0.78/ FEV1 1.41 (60% no BD resp)/ FVC 1.99 (66%)/ TLC 4.04 (75%)/ unable to complete DLCO     6MWT: 25 - 320m () 99-96% on RA       Chest Radiograph     XR chest 2 views 2025  Impression  No acute process.  Signed by Romaine Luo MD      No orders to display        Chest CT Scan     24 - There is a mixed  "ground-glass/soft tissue opacity in the right  lower lobe most likely benign in nature but a six-month low-dose  chest CT follow-up is recommended.  2. Left subpleural atelectasis/scarring most likely  postinfectious/inflammatory. This can be assessed on six-month  low-dose chest CT.    CT lung screening follow up CT chest wo IV contrast 11/22/2024  Impression  1. There is minimal interval change in the right lower lobe  ground-glass/soft tissue nodular density. While this continued to  most likely be nodular in nature a continued six-month low-dose chest  CT is recommended for surveillance.  2. Estimated coronary artery calcium score is 34* which correlates  with at least 59th percentile rank as compared to matched HERNANDEZ-study  subjects(https://www.hernandez-nhlbi.org/Calcium/input.aspx).    5/21/25 - Unchanged size and appearance of 7 mm right lower lobe nodular  density. Continued screening with low-dose noncontrast chest CT in 12  months (from current date) is recommended.  2. Mild upper lung predominant emphysema.  3. Estimated coronary artery calcium score is 60.0* which correlates  with at least 65th percentile rank as compared to matched HERNANDEZ-study  subjects(https://www.hernandez-nhlbi.org/Calcium/input.aspx).  4. Layering high density material in the gallbladder which may be due  to excreted contrast versus sludge/stone.      Echocardiogram     None on record       Other testing/ Labs      Eosinophils Absolute (x10*3/uL)   Date Value   05/20/2025 0.06   05/09/2025 0.02   03/07/2025 0.00     No results found for: \"IGE\"    REVIEW OF SYSTEMS     REVIEW OF SYSTEMS  Review of Systems   Constitutional:  Negative for fatigue and fever.   HENT:  Negative for congestion, postnasal drip, rhinorrhea, sinus pressure and voice change.    Eyes:  Negative for pain and visual disturbance.   Cardiovascular:  Negative for chest pain, palpitations and leg swelling.   Gastrointestinal:  Negative for abdominal pain, diarrhea, nausea and " vomiting.   Endocrine: Negative for cold intolerance and heat intolerance.   Musculoskeletal:  Negative for arthralgias, back pain, joint swelling and myalgias.   Skin:  Negative for rash.   Neurological:  Negative for dizziness, weakness, numbness and headaches.   Psychiatric/Behavioral:  Negative for agitation. The patient is not nervous/anxious.          PHYSICAL EXAM     VITAL SIGNS: /57 (BP Location: Left arm, Patient Position: Sitting, BP Cuff Size: Adult)   Pulse 76   Temp 35.8 °C (96.4 °F) (Temporal)   Resp 14   Wt 72.1 kg (158 lb 14.4 oz)   SpO2 100%   BMI 24.89 kg/m²      CURRENT WEIGHT: [unfilled]  BMI: [unfilled]  PREVIOUS WEIGHTS:  Wt Readings from Last 3 Encounters:   06/25/25 72.1 kg (158 lb 14.4 oz)   06/17/25 72.7 kg (160 lb 4.4 oz)   05/15/25 73.3 kg (161 lb 8 oz)       Physical Exam  Vitals reviewed.   Constitutional:       General: She is not in acute distress.     Appearance: Normal appearance. She is not ill-appearing or toxic-appearing.   HENT:      Head: Normocephalic.      Nose: No rhinorrhea.   Cardiovascular:      Rate and Rhythm: Normal rate and regular rhythm.      Heart sounds: Normal heart sounds.   Pulmonary:      Effort: Pulmonary effort is normal. No respiratory distress.      Breath sounds: Normal breath sounds. No stridor. No wheezing, rhonchi or rales.   Abdominal:      General: Abdomen is flat.   Musculoskeletal:         General: Normal range of motion.      Right lower leg: No edema.      Left lower leg: No edema.   Skin:     General: Skin is warm and dry.      Nails: There is no clubbing.   Neurological:      General: No focal deficit present.      Mental Status: She is alert and oriented to person, place, and time.   Psychiatric:         Mood and Affect: Mood normal.         Behavior: Behavior normal.         Judgment: Judgment normal.       ASSESSMENT/PLAN     Wheezing: recent ED with the flu - given doxycycline/ azithromycin in ED. PFTs without obstruction - no BD  response. Restriction. Wheezing resolved   -  continue albuterol 2 puffs every 4-6 hours as needed     2. Pulmonary nodule: seen on lung cancer screening CT in 5/2024 - same size in 11/2024. Stable 5/2025  - follow up lung cancer screening CT already ordered by PCP -- due in 5/2026    3. Smoking cessation: ~39 pack years currently smoking 1ppd -> 0.5 ppd   - > 5 minutes smoking cessation counseling     Thank you for visiting the Pulmonary clinic today!   Return to clinic  1 year after lung cancer screening CT 5/2026 or sooner if needed   Melisa Ding CNP  My office -  [phone]- Sai is my . Michelle is my nurse [phone].   Radiology scheduling [phone]   Appointment scheduling [phone]   Pulmonary function testing - [phone]

## 2025-06-25 NOTE — PATIENT INSTRUCTIONS
Wheezing: recent ED with the flu - given doxycycline/ azithromycin in ED. PFTs without obstruction - no BD response. Restriction. Wheezing resolved   -  continue albuterol 2 puffs every 4-6 hours as needed     2. Pulmonary nodule: seen on lung cancer screening CT in 5/2024 - same size in 11/2024. Stable 5/2025  - follow up lung cancer screening CT already ordered by PCP -- due in 5/2026    3. Smoking cessation: currently smoking 1ppd  -> 0.5 ppd   - > 5 minutes smoking cessation counseling     Thank you for visiting the Pulmonary clinic today!   Return to clinic  1 year after lung cancer screening CT 5/2026 or sooner if needed   Melisa Ding CNP  My office -  [phone]- Sai is my . Michelle is my nurse [phone].   Radiology scheduling [phone]   Appointment scheduling [phone]   Pulmonary function testing - [phone]

## 2025-06-30 ENCOUNTER — OFFICE VISIT (OUTPATIENT)
Facility: HOSPITAL | Age: 70
End: 2025-06-30
Payer: COMMERCIAL

## 2025-06-30 VITALS
DIASTOLIC BLOOD PRESSURE: 74 MMHG | HEIGHT: 67 IN | OXYGEN SATURATION: 98 % | BODY MASS INDEX: 24.71 KG/M2 | HEART RATE: 82 BPM | RESPIRATION RATE: 18 BRPM | WEIGHT: 157.4 LBS | SYSTOLIC BLOOD PRESSURE: 111 MMHG | TEMPERATURE: 97.3 F

## 2025-06-30 DIAGNOSIS — M48.061 SPINAL STENOSIS OF LUMBAR REGION WITHOUT NEUROGENIC CLAUDICATION: ICD-10-CM

## 2025-06-30 DIAGNOSIS — E11.3291 TYPE 2 DIABETES MELLITUS WITH RIGHT EYE AFFECTED BY MILD NONPROLIFERATIVE RETINOPATHY WITHOUT MACULAR EDEMA, WITHOUT LONG-TERM CURRENT USE OF INSULIN: Primary | ICD-10-CM

## 2025-06-30 DIAGNOSIS — Z83.718 OTHER FAMILY HISTORY OF COLON POLYPS: ICD-10-CM

## 2025-06-30 DIAGNOSIS — D13.91 POLYPOSIS COLI: ICD-10-CM

## 2025-06-30 LAB — POC FINGERSTICK BLOOD GLUCOSE: 130 MG/DL (ref 70–100)

## 2025-06-30 PROCEDURE — 99214 OFFICE O/P EST MOD 30 MIN: CPT | Performed by: STUDENT IN AN ORGANIZED HEALTH CARE EDUCATION/TRAINING PROGRAM

## 2025-06-30 PROCEDURE — 3008F BODY MASS INDEX DOCD: CPT | Performed by: STUDENT IN AN ORGANIZED HEALTH CARE EDUCATION/TRAINING PROGRAM

## 2025-06-30 PROCEDURE — 4010F ACE/ARB THERAPY RXD/TAKEN: CPT | Performed by: STUDENT IN AN ORGANIZED HEALTH CARE EDUCATION/TRAINING PROGRAM

## 2025-06-30 PROCEDURE — 82962 GLUCOSE BLOOD TEST: CPT | Performed by: STUDENT IN AN ORGANIZED HEALTH CARE EDUCATION/TRAINING PROGRAM

## 2025-06-30 PROCEDURE — 3074F SYST BP LT 130 MM HG: CPT | Performed by: STUDENT IN AN ORGANIZED HEALTH CARE EDUCATION/TRAINING PROGRAM

## 2025-06-30 PROCEDURE — 1159F MED LIST DOCD IN RCRD: CPT | Performed by: STUDENT IN AN ORGANIZED HEALTH CARE EDUCATION/TRAINING PROGRAM

## 2025-06-30 PROCEDURE — 1126F AMNT PAIN NOTED NONE PRSNT: CPT | Performed by: STUDENT IN AN ORGANIZED HEALTH CARE EDUCATION/TRAINING PROGRAM

## 2025-06-30 PROCEDURE — 3078F DIAST BP <80 MM HG: CPT | Performed by: STUDENT IN AN ORGANIZED HEALTH CARE EDUCATION/TRAINING PROGRAM

## 2025-06-30 RX ORDER — ACETAMINOPHEN 325 MG/1
650 TABLET ORAL EVERY 8 HOURS
Qty: 500 TABLET | Refills: 3 | Status: SHIPPED | OUTPATIENT
Start: 2025-06-30

## 2025-06-30 RX ORDER — BLOOD-GLUCOSE,RECEIVER,CONT
EACH MISCELLANEOUS
Qty: 1 EACH | Refills: 0 | Status: SHIPPED | OUTPATIENT
Start: 2025-06-30

## 2025-06-30 RX ORDER — METFORMIN HYDROCHLORIDE 500 MG/1
500 TABLET ORAL
Qty: 90 TABLET | Refills: 3 | Status: SHIPPED | OUTPATIENT
Start: 2025-06-30

## 2025-06-30 RX ORDER — BLOOD-GLUCOSE SENSOR
EACH MISCELLANEOUS
Qty: 1 EACH | Refills: 0 | Status: SHIPPED | OUTPATIENT
Start: 2025-06-30

## 2025-06-30 ASSESSMENT — PAIN SCALES - GENERAL: PAINLEVEL_OUTOF10: 0-NO PAIN

## 2025-06-30 NOTE — PROGRESS NOTES
Subjective   Patient ID: Leni Berman is a 70 y.o. female who presents for Follow-up.    #T2DM  -Last HbA1c 6.1%  -Doesn't measure BGs at home, would like to inquire about a continous glucose monitor.  -Takes meds everyday (metformn 500 mg 1x a day) , doesn't report missing doses.   -Diet: [place] here and there, broccoli, mashed potatoes, baked meat loaf, hamburgers.  -Exercise: 15 minute walk everyday.  -Denies polydipsia, polyuria.  -Follows up with eye doctor, podiatrist.     #HTN  -Meds: enalapril 5mg 1x a day  -Missing doses: No  -Taking BP at home: not regularly,  -smokin pack per day.   -denies HA, chest pains, SOB, LE edema, acute vision changes.    Review of Systems  10 point review of systems negative except as per HPI above.    Previous history  Medical History[1]  Surgical History[2]  Social History[3]  Family History[4]  Allergies[5]  Current Outpatient Medications   Medication Instructions    acetaminophen (TYLENOL) 650 mg, oral, Every 8 hours    albuterol 90 mcg/actuation inhaler 2 puffs, inhalation, Every 4 hours PRN    ammonium lactate (Lac-Hydrin) 12 % lotion Topical, As needed    atorvastatin (LIPITOR) 40 mg, oral, Daily    Dexcom G7  misc Use as instructed    Dexcom G7 Sensor device Use as directed.    diclofenac sodium (VOLTAREN) 4 g, Topical, 4 times daily    enalapril (VASOTEC) 5 mg, oral, Daily    lidocaine HCL 4 % adhesive patch,medicated 1 patch, topical (top), Every 24 hours PRN    magnesium oxide (MAG-OX) 400 mg, Daily    metFORMIN (GLUCOPHAGE) 500 mg, oral, Daily with breakfast    Pure Comfort Safety Lancets 30 gauge misc     sennosides-docusate sodium (Zandra-Colace) 8.6-50 mg tablet 1 tablet, oral, Daily       Objective     Vitals:    25 0805   BP: 111/74   Pulse: 82   Resp: 18   Temp: 36.3 °C (97.3 °F)   SpO2: 98%     Physical Exam  Cardiovascular:      Rate and Rhythm: Normal rate and regular rhythm.      Pulses: Normal pulses.      Heart sounds: Normal heart  shane.       Assessment/Plan   Leni Berman is a 70 y.o. female with history who presents for the concerns below:    Problem List Items Addressed This Visit          Endocrine/Metabolic    Type 2 diabetes mellitus with right eye affected by mild nonproliferative retinopathy without macular edema, without long-term current use of insulin - Primary    Relevant Medications    Dexcom G7 Sensor device    Dexcom G7  misc    metFORMIN (Glucophage) 500 mg tablet    Other Relevant Orders    Hemoglobin A1c    POCT Fingerstick Glucose manually resulted (Completed)       Neuro    Spinal stenosis of lumbar region without neurogenic claudication    Relevant Medications    acetaminophen (Tylenol) 325 mg tablet     Other Visit Diagnoses         Polyposis coli        Relevant Orders    Colonoscopy Screening; High Risk Patient; High burden of polyps found, genetics consulted, incomplete prep       Other family history of colon polyps        Relevant Orders    Colonoscopy Screening; High Risk Patient; High burden of polyps found, genetics consulted, incomplete prep                #T2DM  -Last HbA1c 6.1%  -Doesn't measure BGs at home, would like to inquire about a continous glucose monitor.  -Takes meds everyday (metformn 500 mg 1x a day) , doesn't report missing doses.   -Diet: [place] here and there, broccoli, mashed potatoes, baked meat loaf, hamburgers.  -Exercise: 15 minute walk everyday.  -Denies polydipsia, polyuria.  -Follows up with eye doctor, podiatrist.   -PLAN:   -Will get fasting glucose and repeat HbA1c.  -Will order glucose monitor.    #HTN  -Meds: enalapril 5mg 1x a day  -Missing doses: No  -Taking BP at home: not regularly,  -smokin pack per day.   -denies HA, chest pains, SOB, LE edema, acute vision changes.  -PLAN:  -BP at goal, will CTM.    Return in : 2-3 months or earlier as needed.    Discussed with co-signer of note Dr. Xiong      Portions of this note were generated using digital voice  recognition software, and may contain grammatical errors       Priscila Woods M3    I was present with the medical student who participated in the documentation of this note.  I have personally seen and examined the patient and performed the medical decision-making components. I have reviewed the medical student documentation and/or resident documentation and verified the findings in the note as written with additions or exceptions as stated in the body of the note.    Neal Xiong MD         [1]   Past Medical History:  Diagnosis Date    Diabetes mellitus (Multi)     Hypertension    [2] No past surgical history on file.  [3]   Social History  Tobacco Use    Smoking status: Every Day     Types: Cigarettes    Smokeless tobacco: Never   Substance Use Topics    Alcohol use: Yes    Drug use: Never   [4] No family history on file.  [5]   Allergies  Allergen Reactions    Fish Containing Products Swelling    Penicillins Hives, Itching, Rash and Swelling     Pt said she last had penicillin over 10 years ago, and she developed a rash on her skin. She denies ever having an anaphylactic reaction, or having any airway swelling. TOLERATED CEFTRIAXONE WITH NO ADVERSE EVENTS    Shellfish Containing Products Rash    Fish Derived Swelling

## 2025-06-30 NOTE — PROGRESS NOTES
"  Subjective   Patient ID: Leni Berman is a 70 y.o. female who presents for Follow-up.     #T2DM  -Last HbA1c 6.1%  -Doesn't measure BGs at home, would like to inquire about a continous glucose monitor.  -Takes meds everyday (metformn 500 mg 1x a day) , doesn't report missing doses.   -Diet: [place] here and there, broccoli, mashed potatoes, baked meat loaf, hamburgers.  -Exercise: 15 minute walk everyday.  -Denies polydipsia, polyuria.  -Follows up with eye doctor, podiatrist.   Finds finger sticking onerous, and often does not get enough blood for it to work  Overall fed up with finger sticking and thus has not done it consistently in several weeks-months     #HTN  -Meds: enalapril 5mg 1x a day  -Missing doses: No  -Taking BP at home: not regularly,  -smokin pack per day.   -denies HA, chest pains, SOB, LE edema, acute vision changes.               Review of Systems   All other systems reviewed and are negative.      Objective   /74 (BP Location: Right arm, Patient Position: Sitting, BP Cuff Size: Adult)   Pulse 82   Temp 36.3 °C (97.3 °F) (Temporal)   Resp 18   Ht 1.702 m (5' 7\")   Wt 71.4 kg (157 lb 6.4 oz)   SpO2 98%   BMI 24.65 kg/m²         Physical Exam  Vitals reviewed.   Constitutional:       Appearance: Normal appearance.   HENT:      Head: Normocephalic and atraumatic.      Mouth/Throat:      Mouth: Mucous membranes are moist.      Pharynx: Oropharynx is clear.   Eyes:      Extraocular Movements: Extraocular movements intact.      Conjunctiva/sclera: Conjunctivae normal.      Pupils: Pupils are equal, round, and reactive to light.   Cardiovascular:      Rate and Rhythm: Normal rate and regular rhythm.      Pulses: Normal pulses.      Heart sounds: Normal heart sounds.   Pulmonary:      Effort: Pulmonary effort is normal.      Breath sounds: Normal breath sounds.   Abdominal:      General: Abdomen is flat. Bowel sounds are normal.      Palpations: Abdomen is soft.   Musculoskeletal:    "      General: Normal range of motion.      Cervical back: Normal range of motion and neck supple.   Skin:     General: Skin is warm and dry.      Capillary Refill: Capillary refill takes less than 2 seconds.   Neurological:      General: No focal deficit present.      Mental Status: She is alert.   Psychiatric:         Mood and Affect: Mood normal.         Behavior: Behavior normal.         Assessment/Plan   Problem List Items Addressed This Visit           ICD-10-CM    Type 2 diabetes mellitus with right eye affected by mild nonproliferative retinopathy without macular edema, without long-term current use of insulin - Primary E11.3291    Relevant Medications    Dexcom G7 Sensor device    Dexcom G7  misc    metFORMIN (Glucophage) 500 mg tablet    Other Relevant Orders    Hemoglobin A1c    POCT Fingerstick Glucose manually resulted (Completed)    Spinal stenosis of lumbar region without neurogenic claudication M48.061    Relevant Medications    acetaminophen (Tylenol) 325 mg tablet     Other Visit Diagnoses         Codes      Polyposis coli     D13.91    Relevant Orders    Colonoscopy Screening; High Risk Patient; High burden of polyps found, genetics consulted, incomplete prep 2024      Other family history of colon polyps     Z83.718    Relevant Orders    Colonoscopy Screening; High Risk Patient; High burden of polyps found, genetics consulted, incomplete prep 2024                       OVERALL PLAN:  [ ] Order for CGM placed  [ ] repeat colonoscopy      Note: This documentaion was entered into the electronic medical record using Dragon medical dictation software, and was not necessarily edited thereafter. Please excuse any errors of spelling or grammar.

## 2025-07-03 DIAGNOSIS — E11.3291 TYPE 2 DIABETES MELLITUS WITH RIGHT EYE AFFECTED BY MILD NONPROLIFERATIVE RETINOPATHY WITHOUT MACULAR EDEMA, WITHOUT LONG-TERM CURRENT USE OF INSULIN: ICD-10-CM

## 2025-07-07 ENCOUNTER — OFFICE VISIT (OUTPATIENT)
Dept: GASTROENTEROLOGY | Facility: HOSPITAL | Age: 70
End: 2025-07-07
Payer: COMMERCIAL

## 2025-07-07 VITALS
HEART RATE: 80 BPM | DIASTOLIC BLOOD PRESSURE: 68 MMHG | TEMPERATURE: 97.6 F | OXYGEN SATURATION: 97 % | SYSTOLIC BLOOD PRESSURE: 102 MMHG | WEIGHT: 161.3 LBS | BODY MASS INDEX: 25.26 KG/M2

## 2025-07-07 DIAGNOSIS — R19.5 DARK STOOLS: ICD-10-CM

## 2025-07-07 DIAGNOSIS — K63.5 POLYP OF COLON, UNSPECIFIED PART OF COLON, UNSPECIFIED TYPE: Primary | ICD-10-CM

## 2025-07-07 DIAGNOSIS — K59.00 CONSTIPATION, UNSPECIFIED CONSTIPATION TYPE: ICD-10-CM

## 2025-07-07 PROCEDURE — 3074F SYST BP LT 130 MM HG: CPT | Performed by: NURSE PRACTITIONER

## 2025-07-07 PROCEDURE — 4010F ACE/ARB THERAPY RXD/TAKEN: CPT | Performed by: NURSE PRACTITIONER

## 2025-07-07 PROCEDURE — 4004F PT TOBACCO SCREEN RCVD TLK: CPT | Performed by: NURSE PRACTITIONER

## 2025-07-07 PROCEDURE — 1159F MED LIST DOCD IN RCRD: CPT | Performed by: NURSE PRACTITIONER

## 2025-07-07 PROCEDURE — 99214 OFFICE O/P EST MOD 30 MIN: CPT | Performed by: NURSE PRACTITIONER

## 2025-07-07 PROCEDURE — 3078F DIAST BP <80 MM HG: CPT | Performed by: NURSE PRACTITIONER

## 2025-07-07 PROCEDURE — 1126F AMNT PAIN NOTED NONE PRSNT: CPT | Performed by: NURSE PRACTITIONER

## 2025-07-07 PROCEDURE — 99212 OFFICE O/P EST SF 10 MIN: CPT | Performed by: NURSE PRACTITIONER

## 2025-07-07 RX ORDER — PSYLLIUM HUSK 0.4 G
1 CAPSULE ORAL DAILY
COMMUNITY

## 2025-07-07 RX ORDER — BLOOD-GLUCOSE,RECEIVER,CONT
EACH MISCELLANEOUS
Qty: 1 EACH | Refills: 0 | Status: SHIPPED | OUTPATIENT
Start: 2025-07-07

## 2025-07-07 RX ORDER — DOCUSATE SODIUM 100 MG/1
100 CAPSULE, LIQUID FILLED ORAL EVERY 12 HOURS
COMMUNITY

## 2025-07-07 SDOH — ECONOMIC STABILITY: FOOD INSECURITY: WITHIN THE PAST 12 MONTHS, YOU WORRIED THAT YOUR FOOD WOULD RUN OUT BEFORE YOU GOT MONEY TO BUY MORE.: NEVER TRUE

## 2025-07-07 SDOH — ECONOMIC STABILITY: FOOD INSECURITY: WITHIN THE PAST 12 MONTHS, THE FOOD YOU BOUGHT JUST DIDN'T LAST AND YOU DIDN'T HAVE MONEY TO GET MORE.: NEVER TRUE

## 2025-07-07 ASSESSMENT — LIFESTYLE VARIABLES
HOW OFTEN DO YOU HAVE A DRINK CONTAINING ALCOHOL: MONTHLY OR LESS
AUDIT-C TOTAL SCORE: 1
HOW OFTEN DO YOU HAVE SIX OR MORE DRINKS ON ONE OCCASION: NEVER
SKIP TO QUESTIONS 9-10: 1
HOW MANY STANDARD DRINKS CONTAINING ALCOHOL DO YOU HAVE ON A TYPICAL DAY: 1 OR 2

## 2025-07-07 ASSESSMENT — PAIN SCALES - GENERAL: PAINLEVEL_OUTOF10: 0-NO PAIN

## 2025-07-07 ASSESSMENT — PATIENT HEALTH QUESTIONNAIRE - PHQ9
SUM OF ALL RESPONSES TO PHQ9 QUESTIONS 1 & 2: 0
2. FEELING DOWN, DEPRESSED OR HOPELESS: NOT AT ALL
1. LITTLE INTEREST OR PLEASURE IN DOING THINGS: NOT AT ALL

## 2025-07-07 NOTE — PATIENT INSTRUCTIONS
Recommendations  Increase Colace to daily or every other day.  Schedule repeat colonoscopy with Dr. Negar Spears with 2 day bowel prep with [facility]. You can call [phone] to schedule.   Recommend genetics eval given large amount of colonic polyps   Follow up as needed.

## 2025-07-28 ENCOUNTER — HOSPITAL ENCOUNTER (OUTPATIENT)
Dept: RADIOLOGY | Facility: HOSPITAL | Age: 70
Discharge: HOME | End: 2025-07-28
Payer: COMMERCIAL

## 2025-07-28 DIAGNOSIS — Z13.9 SCREENING DUE: ICD-10-CM

## 2025-07-28 DIAGNOSIS — Z12.31 ENCOUNTER FOR SCREENING MAMMOGRAM FOR MALIGNANT NEOPLASM OF BREAST: ICD-10-CM

## 2025-07-28 PROCEDURE — 77063 BREAST TOMOSYNTHESIS BI: CPT

## 2025-07-28 PROCEDURE — 77063 BREAST TOMOSYNTHESIS BI: CPT | Performed by: RADIOLOGY

## 2025-07-28 PROCEDURE — 77067 SCR MAMMO BI INCL CAD: CPT | Performed by: RADIOLOGY

## 2025-07-29 ENCOUNTER — TELEPHONE (OUTPATIENT)
Dept: GASTROENTEROLOGY | Facility: HOSPITAL | Age: 70
End: 2025-07-29
Payer: COMMERCIAL

## 2025-07-30 ENCOUNTER — TELEPHONE (OUTPATIENT)
Dept: GASTROENTEROLOGY | Facility: HOSPITAL | Age: 70
End: 2025-07-30
Payer: COMMERCIAL

## 2025-07-30 NOTE — TELEPHONE ENCOUNTER
Left voicemail per Dr. Stephenson the patient is to do a two day bowel prep for procedure on Friday and to call back for instructions.

## 2025-08-01 ENCOUNTER — HOSPITAL ENCOUNTER (OUTPATIENT)
Dept: GASTROENTEROLOGY | Facility: HOSPITAL | Age: 70
Discharge: HOME | End: 2025-08-01
Payer: COMMERCIAL

## 2025-08-01 VITALS
DIASTOLIC BLOOD PRESSURE: 88 MMHG | TEMPERATURE: 97.9 F | HEIGHT: 67 IN | BODY MASS INDEX: 24.8 KG/M2 | HEART RATE: 88 BPM | RESPIRATION RATE: 20 BRPM | WEIGHT: 158 LBS | SYSTOLIC BLOOD PRESSURE: 141 MMHG | OXYGEN SATURATION: 97 %

## 2025-08-01 DIAGNOSIS — Z83.718 OTHER FAMILY HISTORY OF COLON POLYPS: ICD-10-CM

## 2025-08-01 DIAGNOSIS — D13.91 POLYPOSIS COLI: ICD-10-CM

## 2025-08-01 LAB — GLUCOSE BLD MANUAL STRIP-MCNC: 102 MG/DL (ref 74–99)

## 2025-08-01 PROCEDURE — 7100000010 HC PHASE TWO TIME - EACH INCREMENTAL 1 MINUTE

## 2025-08-01 PROCEDURE — 3700000012 HC SEDATION LEVEL 5+ TIME - INITIAL 15 MINUTES 5/> YEARS

## 2025-08-01 PROCEDURE — 82947 ASSAY GLUCOSE BLOOD QUANT: CPT

## 2025-08-01 PROCEDURE — 7100000009 HC PHASE TWO TIME - INITIAL BASE CHARGE

## 2025-08-01 PROCEDURE — 3700000013 HC SEDATION LEVEL 5+ TIME - EACH ADDITIONAL 15 MINUTES

## 2025-08-01 PROCEDURE — 45380 COLONOSCOPY AND BIOPSY: CPT | Performed by: INTERNAL MEDICINE

## 2025-08-01 PROCEDURE — 2500000004 HC RX 250 GENERAL PHARMACY W/ HCPCS (ALT 636 FOR OP/ED): Performed by: INTERNAL MEDICINE

## 2025-08-01 RX ORDER — FENTANYL CITRATE 50 UG/ML
INJECTION, SOLUTION INTRAMUSCULAR; INTRAVENOUS AS NEEDED
Status: COMPLETED | OUTPATIENT
Start: 2025-08-01 | End: 2025-08-01

## 2025-08-01 RX ORDER — MIDAZOLAM HYDROCHLORIDE 1 MG/ML
INJECTION, SOLUTION INTRAMUSCULAR; INTRAVENOUS AS NEEDED
Status: COMPLETED | OUTPATIENT
Start: 2025-08-01 | End: 2025-08-01

## 2025-08-01 RX ADMIN — MIDAZOLAM 1 MG: 1 INJECTION INTRAMUSCULAR; INTRAVENOUS at 15:00

## 2025-08-01 RX ADMIN — FENTANYL CITRATE 50 MCG: 50 INJECTION, SOLUTION INTRAMUSCULAR; INTRAVENOUS at 14:20

## 2025-08-01 RX ADMIN — FENTANYL CITRATE 25 MCG: 50 INJECTION, SOLUTION INTRAMUSCULAR; INTRAVENOUS at 14:38

## 2025-08-01 RX ADMIN — MIDAZOLAM 1 MG: 1 INJECTION INTRAMUSCULAR; INTRAVENOUS at 14:23

## 2025-08-01 RX ADMIN — MIDAZOLAM 1 MG: 1 INJECTION INTRAMUSCULAR; INTRAVENOUS at 14:20

## 2025-08-01 RX ADMIN — FENTANYL CITRATE 25 MCG: 50 INJECTION, SOLUTION INTRAMUSCULAR; INTRAVENOUS at 14:27

## 2025-08-01 ASSESSMENT — PAIN SCALES - GENERAL
PAINLEVEL_OUTOF10: 0 - NO PAIN
PAINLEVEL_OUTOF10: 0 - NO PAIN
PAINLEVEL_OUTOF10: 5 - MODERATE PAIN
PAINLEVEL_OUTOF10: 0 - NO PAIN
PAINLEVEL_OUTOF10: 5 - MODERATE PAIN
PAINLEVEL_OUTOF10: 0 - NO PAIN
PAINLEVEL_OUTOF10: 5 - MODERATE PAIN
PAINLEVEL_OUTOF10: 0 - NO PAIN
PAINLEVEL_OUTOF10: 5 - MODERATE PAIN
PAINLEVEL_OUTOF10: 0 - NO PAIN
PAINLEVEL_OUTOF10: 0 - NO PAIN
PAINLEVEL_OUTOF10: 5 - MODERATE PAIN
PAINLEVEL_OUTOF10: 0 - NO PAIN

## 2025-08-01 ASSESSMENT — COLUMBIA-SUICIDE SEVERITY RATING SCALE - C-SSRS: 1. IN THE PAST MONTH, HAVE YOU WISHED YOU WERE DEAD OR WISHED YOU COULD GO TO SLEEP AND NOT WAKE UP?: NO

## 2025-08-01 ASSESSMENT — PAIN - FUNCTIONAL ASSESSMENT
PAIN_FUNCTIONAL_ASSESSMENT: 0-10

## 2025-08-03 ENCOUNTER — HOSPITAL ENCOUNTER (EMERGENCY)
Facility: HOSPITAL | Age: 70
Discharge: HOME | End: 2025-08-03
Attending: EMERGENCY MEDICINE
Payer: COMMERCIAL

## 2025-08-03 ENCOUNTER — CLINICAL SUPPORT (OUTPATIENT)
Dept: EMERGENCY MEDICINE | Facility: HOSPITAL | Age: 70
End: 2025-08-03
Payer: COMMERCIAL

## 2025-08-03 VITALS
TEMPERATURE: 98.6 F | BODY MASS INDEX: 24.8 KG/M2 | HEART RATE: 80 BPM | OXYGEN SATURATION: 98 % | RESPIRATION RATE: 15 BRPM | HEIGHT: 67 IN | WEIGHT: 158 LBS | SYSTOLIC BLOOD PRESSURE: 109 MMHG | DIASTOLIC BLOOD PRESSURE: 79 MMHG

## 2025-08-03 DIAGNOSIS — R35.0 URINARY FREQUENCY: Primary | ICD-10-CM

## 2025-08-03 LAB
ALBUMIN SERPL BCP-MCNC: 4.2 G/DL (ref 3.4–5)
ALP SERPL-CCNC: 69 U/L (ref 33–136)
ALT SERPL W P-5'-P-CCNC: 16 U/L (ref 7–45)
ANION GAP BLDV CALCULATED.4IONS-SCNC: 5 MMOL/L (ref 10–25)
ANION GAP SERPL CALC-SCNC: 13 MMOL/L (ref 10–20)
APPEARANCE UR: CLEAR
AST SERPL W P-5'-P-CCNC: 17 U/L (ref 9–39)
BASE EXCESS BLDV CALC-SCNC: 4.8 MMOL/L (ref -2–3)
BASOPHILS # BLD AUTO: 0.03 X10*3/UL (ref 0–0.1)
BASOPHILS NFR BLD AUTO: 0.5 %
BILIRUB SERPL-MCNC: 0.5 MG/DL (ref 0–1.2)
BILIRUB UR STRIP.AUTO-MCNC: NEGATIVE MG/DL
BODY TEMPERATURE: 37 DEGREES CELSIUS
BUN SERPL-MCNC: 10 MG/DL (ref 6–23)
CA-I BLDV-SCNC: 1.24 MMOL/L (ref 1.1–1.33)
CALCIUM SERPL-MCNC: 9.5 MG/DL (ref 8.6–10.6)
CHLORIDE BLDV-SCNC: 106 MMOL/L (ref 98–107)
CHLORIDE SERPL-SCNC: 103 MMOL/L (ref 98–107)
CO2 SERPL-SCNC: 28 MMOL/L (ref 21–32)
COLOR UR: YELLOW
CREAT SERPL-MCNC: 0.8 MG/DL (ref 0.5–1.05)
EGFRCR SERPLBLD CKD-EPI 2021: 79 ML/MIN/1.73M*2
EOSINOPHIL # BLD AUTO: 0.02 X10*3/UL (ref 0–0.7)
EOSINOPHIL NFR BLD AUTO: 0.4 %
ERYTHROCYTE [DISTWIDTH] IN BLOOD BY AUTOMATED COUNT: 15.7 % (ref 11.5–14.5)
GLUCOSE BLDV-MCNC: 127 MG/DL (ref 74–99)
GLUCOSE SERPL-MCNC: 121 MG/DL (ref 74–99)
GLUCOSE UR STRIP.AUTO-MCNC: NORMAL MG/DL
HCO3 BLDV-SCNC: 30.9 MMOL/L (ref 22–26)
HCT VFR BLD AUTO: 37.8 % (ref 36–46)
HCT VFR BLD EST: 40 % (ref 36–46)
HGB BLD-MCNC: 12.5 G/DL (ref 12–16)
HGB BLDV-MCNC: 13.2 G/DL (ref 12–16)
IMM GRANULOCYTES # BLD AUTO: 0.02 X10*3/UL (ref 0–0.7)
IMM GRANULOCYTES NFR BLD AUTO: 0.4 % (ref 0–0.9)
INHALED O2 CONCENTRATION: 21 %
KETONES UR STRIP.AUTO-MCNC: NEGATIVE MG/DL
LACTATE BLDV-SCNC: 1.6 MMOL/L (ref 0.4–2)
LEUKOCYTE ESTERASE UR QL STRIP.AUTO: NEGATIVE
LYMPHOCYTES # BLD AUTO: 2.19 X10*3/UL (ref 1.2–4.8)
LYMPHOCYTES NFR BLD AUTO: 39.7 %
MAGNESIUM SERPL-MCNC: 1.99 MG/DL (ref 1.6–2.4)
MCH RBC QN AUTO: 26.2 PG (ref 26–34)
MCHC RBC AUTO-ENTMCNC: 33.1 G/DL (ref 32–36)
MCV RBC AUTO: 79 FL (ref 80–100)
MONOCYTES # BLD AUTO: 0.45 X10*3/UL (ref 0.1–1)
MONOCYTES NFR BLD AUTO: 8.2 %
NEUTROPHILS # BLD AUTO: 2.8 X10*3/UL (ref 1.2–7.7)
NEUTROPHILS NFR BLD AUTO: 50.8 %
NITRITE UR QL STRIP.AUTO: NEGATIVE
NRBC BLD-RTO: 0 /100 WBCS (ref 0–0)
OXYHGB MFR BLDV: 50.3 % (ref 45–75)
PCO2 BLDV: 51 MM HG (ref 41–51)
PH BLDV: 7.39 PH (ref 7.33–7.43)
PH UR STRIP.AUTO: 5.5 [PH]
PLATELET # BLD AUTO: 146 X10*3/UL (ref 150–450)
PO2 BLDV: 32 MM HG (ref 35–45)
POTASSIUM BLDV-SCNC: 4 MMOL/L (ref 3.5–5.3)
POTASSIUM SERPL-SCNC: 3.8 MMOL/L (ref 3.5–5.3)
PROT SERPL-MCNC: 6.8 G/DL (ref 6.4–8.2)
PROT UR STRIP.AUTO-MCNC: NEGATIVE MG/DL
RBC # BLD AUTO: 4.78 X10*6/UL (ref 4–5.2)
RBC # UR STRIP.AUTO: NEGATIVE MG/DL
SAO2 % BLDV: 54 % (ref 45–75)
SODIUM BLDV-SCNC: 138 MMOL/L (ref 136–145)
SODIUM SERPL-SCNC: 140 MMOL/L (ref 136–145)
SP GR UR STRIP.AUTO: 1.02
UROBILINOGEN UR STRIP.AUTO-MCNC: ABNORMAL MG/DL
WBC # BLD AUTO: 5.5 X10*3/UL (ref 4.4–11.3)

## 2025-08-03 PROCEDURE — 36415 COLL VENOUS BLD VENIPUNCTURE: CPT

## 2025-08-03 PROCEDURE — 82810 BLOOD GASES O2 SAT ONLY: CPT | Mod: CCI

## 2025-08-03 PROCEDURE — 2500000001 HC RX 250 WO HCPCS SELF ADMINISTERED DRUGS (ALT 637 FOR MEDICARE OP)

## 2025-08-03 PROCEDURE — 85025 COMPLETE CBC W/AUTO DIFF WBC: CPT

## 2025-08-03 PROCEDURE — 93005 ELECTROCARDIOGRAM TRACING: CPT

## 2025-08-03 PROCEDURE — 99284 EMERGENCY DEPT VISIT MOD MDM: CPT | Performed by: EMERGENCY MEDICINE

## 2025-08-03 PROCEDURE — 84132 ASSAY OF SERUM POTASSIUM: CPT

## 2025-08-03 PROCEDURE — 84132 ASSAY OF SERUM POTASSIUM: CPT | Mod: 59

## 2025-08-03 PROCEDURE — 99285 EMERGENCY DEPT VISIT HI MDM: CPT | Performed by: EMERGENCY MEDICINE

## 2025-08-03 PROCEDURE — 81003 URINALYSIS AUTO W/O SCOPE: CPT

## 2025-08-03 PROCEDURE — 83735 ASSAY OF MAGNESIUM: CPT

## 2025-08-03 RX ORDER — ACETAMINOPHEN 325 MG/1
975 TABLET ORAL ONCE
Status: COMPLETED | OUTPATIENT
Start: 2025-08-03 | End: 2025-08-03

## 2025-08-03 RX ADMIN — ACETAMINOPHEN 975 MG: 325 TABLET ORAL at 12:13

## 2025-08-03 ASSESSMENT — LIFESTYLE VARIABLES
TOTAL SCORE: 0
HAVE PEOPLE ANNOYED YOU BY CRITICIZING YOUR DRINKING: NO
HAVE YOU EVER FELT YOU SHOULD CUT DOWN ON YOUR DRINKING: NO
TOTAL SCORE: 0
EVER HAD A DRINK FIRST THING IN THE MORNING TO STEADY YOUR NERVES TO GET RID OF A HANGOVER: NO
EVER HAD A DRINK FIRST THING IN THE MORNING TO STEADY YOUR NERVES TO GET RID OF A HANGOVER: NO
EVER FELT BAD OR GUILTY ABOUT YOUR DRINKING: NO
EVER FELT BAD OR GUILTY ABOUT YOUR DRINKING: NO
HAVE YOU EVER FELT YOU SHOULD CUT DOWN ON YOUR DRINKING: NO
HAVE PEOPLE ANNOYED YOU BY CRITICIZING YOUR DRINKING: NO

## 2025-08-03 ASSESSMENT — PAIN DESCRIPTION - PAIN TYPE: TYPE: ACUTE PAIN

## 2025-08-03 ASSESSMENT — PAIN SCALES - GENERAL: PAINLEVEL_OUTOF10: 6

## 2025-08-03 ASSESSMENT — PAIN DESCRIPTION - LOCATION: LOCATION: ABDOMEN

## 2025-08-03 ASSESSMENT — PAIN - FUNCTIONAL ASSESSMENT: PAIN_FUNCTIONAL_ASSESSMENT: 0-10

## 2025-08-03 ASSESSMENT — PAIN DESCRIPTION - ORIENTATION: ORIENTATION: LOWER

## 2025-08-03 NOTE — ED PROVIDER NOTES
Emergency Department Provider Note        History of Present Illness     History provided by: Patient  Limitations to History: None    HPI:  Patient is a 70-year-old female present emergency department for urinary frequency.  Patient states over the last 24 hours she has had increased urinary frequency and suprapubic pressure.  Patient denies any fever or chills, no nausea vomiting.  Patient states he had a colonoscopy yesterday denying any rectal bleeding.  Patient states she is a diabetic does not take any insulin states she takes her metformin which she states she took this morning.  Patient does state mild fatigue  Physical Exam   Triage vitals:  T 36.1 °C (96.9 °F)  HR 82  /77  RR 16  O2 96 % None (Room air)    Physical Exam  Constitutional:       Appearance: Normal appearance.   HENT:      Head: Normocephalic.     Eyes:      Extraocular Movements: Extraocular movements intact.       Cardiovascular:      Rate and Rhythm: Normal rate.   Pulmonary:      Effort: Pulmonary effort is normal.   Abdominal:      General: Abdomen is flat.      Tenderness: There is abdominal tenderness (Suprapubic). There is no right CVA tenderness or left CVA tenderness.     Musculoskeletal:         General: Normal range of motion.      Cervical back: Normal range of motion.     Skin:     General: Skin is warm.     Neurological:      Mental Status: She is alert. Mental status is at baseline.     Psychiatric:         Mood and Affect: Mood normal.         Behavior: Behavior normal.          Medical Decision Making & ED Course   Medical Decision Making:    Patient was emergency department for urinary frequency.  Patient is over the last 24 hours has increased urinary frequency and suprapubic pressure on my examination.  Patient has a history of recurrent UTIs states this feels like her typical UTIs.  No fevers or chills.  Patient is nontachycardic satting well on room air is currently afebrile.  Patient has no flank pain low  concern for pyelonephritis.  Will obtain basic labs as well as a urinalysis.  Patient is a diabetic and her point-of-care glucose was 306 we will obtain a VBG for DKA.  Please see ED course further details         EKG Independent Interpretation: EKG interpreted by myself. Please see ED Course for full interpretation.        The patient was discussed with the following consultants/services: None      ED Course as of 08/03/25 2013   Sun Aug 03, 2025   1037 VBG shows pH 7.39, CO2 51, glucose 127, lactate 1.6 [TS]   1157 Urinalysis negative for infection, no leukocytosis no concern electrolyte abnormalities. [TS]   1157 Patient was discharged [TS]   1211 Normal sinus rhythm, heart 79, QTc 451, no ST elevations [TS]      ED Course User Index  [TS] Suzy Garza MD         Diagnoses as of 08/03/25 2013   Urinary frequency          Disposition   As a result of the work-up, the patient was discharged home.  she was informed of her diagnosis and instructed to come back with any concerns or worsening of condition.  she and was agreeable to the plan as discussed above.  she was given the opportunity to ask questions.  All of the patient's questions were answered.    Procedures   Procedures    Patient seen and discussed with ED attending physician.    Suzy Garza MD  Emergency Medicine     Suzy Garza MD  Resident  08/03/25 2014

## 2025-08-03 NOTE — ED TRIAGE NOTES
Pt BIB by [facility]. She complains of urinary frequency since yesterday. She gt a coloscopy yesterday and the frequency increased.  She denies any burning. No other complaints

## 2025-08-04 LAB — HOLD SPECIMEN: NORMAL

## 2025-08-05 LAB
ATRIAL RATE: 79 BPM
P AXIS: 58 DEGREES
P OFFSET: 196 MS
P ONSET: 133 MS
PR INTERVAL: 176 MS
Q ONSET: 221 MS
QRS COUNT: 13 BEATS
QRS DURATION: 80 MS
QT INTERVAL: 394 MS
QTC CALCULATION(BAZETT): 451 MS
QTC FREDERICIA: 432 MS
R AXIS: -7 DEGREES
T AXIS: 44 DEGREES
T OFFSET: 418 MS
VENTRICULAR RATE: 79 BPM

## 2025-08-18 DIAGNOSIS — N39.0 RECURRENT UTI: ICD-10-CM

## 2025-08-26 DIAGNOSIS — E11.3291 TYPE 2 DIABETES MELLITUS WITH RIGHT EYE AFFECTED BY MILD NONPROLIFERATIVE RETINOPATHY WITHOUT MACULAR EDEMA, WITHOUT LONG-TERM CURRENT USE OF INSULIN: ICD-10-CM

## 2025-08-27 RX ORDER — METFORMIN HYDROCHLORIDE 500 MG/1
500 TABLET ORAL 2 TIMES DAILY
Qty: 180 TABLET | Refills: 1 | Status: SHIPPED | OUTPATIENT
Start: 2025-08-27

## 2025-09-02 ENCOUNTER — HOSPITAL ENCOUNTER (EMERGENCY)
Facility: HOSPITAL | Age: 70
Discharge: HOME | End: 2025-09-02
Attending: EMERGENCY MEDICINE
Payer: COMMERCIAL

## 2025-09-02 VITALS
SYSTOLIC BLOOD PRESSURE: 105 MMHG | OXYGEN SATURATION: 96 % | HEART RATE: 100 BPM | TEMPERATURE: 98.3 F | DIASTOLIC BLOOD PRESSURE: 59 MMHG | HEIGHT: 67 IN | BODY MASS INDEX: 24.8 KG/M2 | WEIGHT: 158 LBS | RESPIRATION RATE: 20 BRPM

## 2025-09-02 DIAGNOSIS — R19.7 DIARRHEA, UNSPECIFIED TYPE: Primary | ICD-10-CM

## 2025-09-02 LAB
ALBUMIN SERPL BCP-MCNC: 4.4 G/DL (ref 3.4–5)
ALP SERPL-CCNC: 77 U/L (ref 33–136)
ALT SERPL W P-5'-P-CCNC: 16 U/L (ref 7–45)
ANION GAP SERPL CALC-SCNC: 12 MMOL/L (ref 10–20)
AST SERPL W P-5'-P-CCNC: 18 U/L (ref 9–39)
BASOPHILS # BLD AUTO: 0.03 X10*3/UL (ref 0–0.1)
BASOPHILS NFR BLD AUTO: 0.6 %
BILIRUB SERPL-MCNC: 0.6 MG/DL (ref 0–1.2)
BUN SERPL-MCNC: 12 MG/DL (ref 6–23)
CALCIUM SERPL-MCNC: 9.8 MG/DL (ref 8.6–10.6)
CHLORIDE SERPL-SCNC: 101 MMOL/L (ref 98–107)
CO2 SERPL-SCNC: 30 MMOL/L (ref 21–32)
CREAT SERPL-MCNC: 0.85 MG/DL (ref 0.5–1.05)
EGFRCR SERPLBLD CKD-EPI 2021: 74 ML/MIN/1.73M*2
EOSINOPHIL # BLD AUTO: 0.04 X10*3/UL (ref 0–0.7)
EOSINOPHIL NFR BLD AUTO: 0.8 %
ERYTHROCYTE [DISTWIDTH] IN BLOOD BY AUTOMATED COUNT: 16 % (ref 11.5–14.5)
GLUCOSE BLD MANUAL STRIP-MCNC: 228 MG/DL (ref 74–99)
GLUCOSE SERPL-MCNC: 162 MG/DL (ref 74–99)
HCT VFR BLD AUTO: 41.2 % (ref 36–46)
HGB BLD-MCNC: 13.3 G/DL (ref 12–16)
IMM GRANULOCYTES # BLD AUTO: 0.01 X10*3/UL (ref 0–0.7)
IMM GRANULOCYTES NFR BLD AUTO: 0.2 % (ref 0–0.9)
LIPASE SERPL-CCNC: 16 U/L (ref 9–82)
LYMPHOCYTES # BLD AUTO: 1.77 X10*3/UL (ref 1.2–4.8)
LYMPHOCYTES NFR BLD AUTO: 36.1 %
MCH RBC QN AUTO: 25.5 PG (ref 26–34)
MCHC RBC AUTO-ENTMCNC: 32.3 G/DL (ref 32–36)
MCV RBC AUTO: 79 FL (ref 80–100)
MONOCYTES # BLD AUTO: 0.37 X10*3/UL (ref 0.1–1)
MONOCYTES NFR BLD AUTO: 7.6 %
NEUTROPHILS # BLD AUTO: 2.68 X10*3/UL (ref 1.2–7.7)
NEUTROPHILS NFR BLD AUTO: 54.7 %
NRBC BLD-RTO: 0 /100 WBCS (ref 0–0)
PLATELET # BLD AUTO: 160 X10*3/UL (ref 150–450)
POTASSIUM SERPL-SCNC: 3.8 MMOL/L (ref 3.5–5.3)
PROT SERPL-MCNC: 7.4 G/DL (ref 6.4–8.2)
RBC # BLD AUTO: 5.21 X10*6/UL (ref 4–5.2)
SODIUM SERPL-SCNC: 139 MMOL/L (ref 136–145)
WBC # BLD AUTO: 4.9 X10*3/UL (ref 4.4–11.3)

## 2025-09-02 PROCEDURE — 85025 COMPLETE CBC W/AUTO DIFF WBC: CPT | Performed by: EMERGENCY MEDICINE

## 2025-09-02 PROCEDURE — 96360 HYDRATION IV INFUSION INIT: CPT

## 2025-09-02 PROCEDURE — 83690 ASSAY OF LIPASE: CPT | Performed by: EMERGENCY MEDICINE

## 2025-09-02 PROCEDURE — 82947 ASSAY GLUCOSE BLOOD QUANT: CPT

## 2025-09-02 PROCEDURE — 2500000004 HC RX 250 GENERAL PHARMACY W/ HCPCS (ALT 636 FOR OP/ED): Performed by: STUDENT IN AN ORGANIZED HEALTH CARE EDUCATION/TRAINING PROGRAM

## 2025-09-02 PROCEDURE — 36415 COLL VENOUS BLD VENIPUNCTURE: CPT | Performed by: EMERGENCY MEDICINE

## 2025-09-02 PROCEDURE — 99284 EMERGENCY DEPT VISIT MOD MDM: CPT | Mod: 25 | Performed by: EMERGENCY MEDICINE

## 2025-09-02 PROCEDURE — 80053 COMPREHEN METABOLIC PANEL: CPT | Performed by: EMERGENCY MEDICINE

## 2025-09-02 RX ADMIN — SODIUM CHLORIDE, SODIUM LACTATE, POTASSIUM CHLORIDE, AND CALCIUM CHLORIDE 1000 ML: 600; 310; 30; 20 INJECTION, SOLUTION INTRAVENOUS at 10:32

## 2025-09-02 ASSESSMENT — PAIN - FUNCTIONAL ASSESSMENT: PAIN_FUNCTIONAL_ASSESSMENT: 0-10

## 2025-09-02 ASSESSMENT — PAIN SCALES - GENERAL: PAINLEVEL_OUTOF10: 0 - NO PAIN

## 2025-11-24 ENCOUNTER — APPOINTMENT (OUTPATIENT)
Dept: RADIOLOGY | Facility: HOSPITAL | Age: 70
End: 2025-11-24
Payer: COMMERCIAL